# Patient Record
Sex: FEMALE | Race: WHITE | NOT HISPANIC OR LATINO | Employment: FULL TIME | URBAN - METROPOLITAN AREA
[De-identification: names, ages, dates, MRNs, and addresses within clinical notes are randomized per-mention and may not be internally consistent; named-entity substitution may affect disease eponyms.]

---

## 2022-05-20 DIAGNOSIS — E03.9 ACQUIRED HYPOTHYROIDISM: Primary | ICD-10-CM

## 2022-05-20 RX ORDER — LEVOTHYROXINE SODIUM 0.07 MG/1
75 TABLET ORAL DAILY
Qty: 90 TABLET | Refills: 1 | Status: SHIPPED | OUTPATIENT
Start: 2022-05-20 | End: 2022-06-20 | Stop reason: SDUPTHER

## 2022-08-02 PROBLEM — E66.811 OBESITY (BMI 30.0-34.9): Status: ACTIVE | Noted: 2022-08-02

## 2022-11-15 LAB
T4 FREE SERPL-MCNC: 1.1 NG/DL (ref 0.8–1.8)
TSH SERPL-ACNC: 1.93 MIU/L

## 2022-11-17 ENCOUNTER — OFFICE VISIT (OUTPATIENT)
Dept: ENDOCRINOLOGY | Facility: HOSPITAL | Age: 32
End: 2022-11-17

## 2022-11-17 VITALS
BODY MASS INDEX: 31.69 KG/M2 | HEIGHT: 66 IN | DIASTOLIC BLOOD PRESSURE: 70 MMHG | SYSTOLIC BLOOD PRESSURE: 96 MMHG | HEART RATE: 68 BPM | WEIGHT: 197.2 LBS

## 2022-11-17 DIAGNOSIS — E03.9 ACQUIRED HYPOTHYROIDISM: Primary | ICD-10-CM

## 2022-11-17 NOTE — PROGRESS NOTES
C[place] Han 28 y o  female MRN: [id number]    Encounter: [id number]      Assessment/Plan     Assessment: This is a 28y o -year-old female with hypothyroidism and polycystic ovarian syndrome  Plan:  1  Hypothyroidism:  Most recent thyroid lab work came back normal   Does continue with some weight gain  Is going through IVF after Christmas  At this time will not make any adjustments to her thyroid medication  She will continue with Synthroid 75 mcg daily  Get thyroid lab work completed at the beginning of January  From there will determine if we need to make adjustments to thyroid medication  When she becomes pregnant, we will recheck thyroid function every 4 weeks  Asked her to follow-up in 4 months  Contact the office with any concerns      2  Polycystic ovarian syndrome:  Complete work up was done and came back negative for other causes of her symptoms  Likely has PCOS  No need for medication at this time  We will continue to monitor at this time  CC:  Hypothyroidism and polycystic ovarian syndrome follow-up    History of Present Illness     HPI:  Aleksandra Torres 21  year old female with hypothyroidism who presents for consultation from endocrinology  So Tripathi reports being diagnosed with hypothyroidism about 4-5 years ago and is currently treated with levothyroxine 75 mcg daily  Dose was adjusted after last office visit  Continues to have difficulty losing weight  Mild fatigue at this time  Denies any vision changes, palpitations, tremors, abdominal pain, diarrhea or constipation, hair loss  Does have some dry skin    Additionally she notes coarse hair growth on her chin as well as posterior upper part of her lower extremity   She reports menstrual cycles have been somewhat more irregular  Further workup was completed and came back negative, so likely has polycystic ovarian syndrome    Has been seeing a fertility specialist, as plan on IVF after Christmas   She has no history of pregnancy previously as well  Otherwise doing well today  Review of Systems   Constitutional: Positive for fatigue  Negative for activity change, appetite change, diaphoresis and unexpected weight change  Difficulty losing weight   HENT: Negative for sore throat, trouble swallowing and voice change  Eyes: Negative for visual disturbance  Respiratory: Negative for chest tightness and shortness of breath  Cardiovascular: Negative for chest pain, palpitations and leg swelling  Gastrointestinal: Negative for abdominal pain, constipation and diarrhea  Endocrine: Negative for cold intolerance, heat intolerance, polydipsia, polyphagia and polyuria  Skin: Negative for rash  Dry skin   Neurological: Negative for dizziness, tremors, light-headedness, numbness and headaches  Hematological: Negative for adenopathy  Psychiatric/Behavioral: Negative for dysphoric mood and sleep disturbance  The patient is not nervous/anxious  Historical Information   Past Medical History:   Diagnosis Date   • Hypothyroidism      History reviewed  No pertinent surgical history    Social History   Social History     Substance and Sexual Activity   Alcohol Use Yes     Social History     Substance and Sexual Activity   Drug Use Never     Social History     Tobacco Use   Smoking Status Some Days   • Packs/day: 0 25   • Types: Cigarettes   • Last attempt to quit: 3/17/2022   • Years since quittin 6   Smokeless Tobacco Never   Tobacco Comments    2 cig a day since age 15     Family History:   Family History   Problem Relation Age of Onset   • Thyroid disease unspecified Mother    • Heart disease Mother    • Stroke Mother    • Hyperthyroidism Mother    • Diabetes Brother    • No Known Problems Father    • Colon cancer Cousin        Meds/Allergies   Current Outpatient Medications   Medication Sig Dispense Refill   • citalopram (CeleXA) 10 mg tablet TAKE 1 TABLET BY MOUTH EVERY DAY 90 tablet 1   • levothyroxine (Synthroid) 75 mcg tablet Take 1 tablet (75 mcg total) by mouth daily 90 tablet 1   • Prenatal Vit-Fe Fumarate-FA (PRENATAL VITAMINS PO) Take 1 tablet by mouth daily       No current facility-administered medications for this visit  No Known Allergies    Objective   Vitals: Blood pressure 96/70, pulse 68, height 5' 6" (1 676 m), weight 89 4 kg (197 lb 3 2 oz)  Physical Exam  Vitals and nursing note reviewed  Constitutional:       General: She is not in acute distress  Appearance: Normal appearance  She is not diaphoretic  HENT:      Head: Normocephalic and atraumatic  Eyes:      General: No scleral icterus  Extraocular Movements: Extraocular movements intact  Conjunctiva/sclera: Conjunctivae normal       Pupils: Pupils are equal, round, and reactive to light  Cardiovascular:      Rate and Rhythm: Normal rate and regular rhythm  Heart sounds: No murmur heard  Pulmonary:      Effort: Pulmonary effort is normal  No respiratory distress  Breath sounds: Normal breath sounds  No wheezing  Musculoskeletal:      Cervical back: Normal range of motion  Right lower leg: No edema  Left lower leg: No edema  Lymphadenopathy:      Cervical: No cervical adenopathy  Neurological:      Mental Status: She is alert and oriented to person, place, and time  Mental status is at baseline  Sensory: No sensory deficit  Gait: Gait normal    Psychiatric:         Mood and Affect: Mood normal          Behavior: Behavior normal          Thought Content: Thought content normal          The history was obtained from the review of the chart, patient  Lab Results:   Lab Results   Component Value Date/Time    Free t4 1 1 11/14/2022 12:05 PM    Free t4 1 3 06/30/2022 07:03 AM    Free t4 1 2 05/19/2022 08:25 AM       Portions of the record may have been created with voice recognition software   Occasional wrong word or "sound a like" substitutions may have occurred due to the inherent limitations of voice recognition software  Read the chart carefully and recognize, using context, where substitutions have occurred

## 2022-11-17 NOTE — PATIENT INSTRUCTIONS
Continue levothyroxine 75 mcg daily  Get lab work completed beginning of January  Contact the office with any concerns or questions  Follow-up in 4 months with lab work completed prior to visit

## 2022-12-22 DIAGNOSIS — E03.9 ACQUIRED HYPOTHYROIDISM: ICD-10-CM

## 2022-12-22 DIAGNOSIS — F33.0 MILD EPISODE OF RECURRENT MAJOR DEPRESSIVE DISORDER (HCC): ICD-10-CM

## 2022-12-22 RX ORDER — CITALOPRAM 10 MG/1
TABLET ORAL
Qty: 90 TABLET | Refills: 1 | Status: SHIPPED | OUTPATIENT
Start: 2022-12-22

## 2022-12-22 RX ORDER — LEVOTHYROXINE SODIUM 0.07 MG/1
TABLET ORAL
Qty: 90 TABLET | Refills: 1 | Status: SHIPPED | OUTPATIENT
Start: 2022-12-22

## 2023-01-10 LAB
ALBUMIN SERPL-MCNC: 4.4 G/DL (ref 3.6–5.1)
ALBUMIN/GLOB SERPL: 1.7 (CALC) (ref 1–2.5)
ALP SERPL-CCNC: 73 U/L (ref 31–125)
ALT SERPL-CCNC: 14 U/L (ref 6–29)
AST SERPL-CCNC: 16 U/L (ref 10–30)
BILIRUB SERPL-MCNC: 0.6 MG/DL (ref 0.2–1.2)
BUN SERPL-MCNC: 15 MG/DL (ref 7–25)
BUN/CREAT SERPL: NORMAL (CALC) (ref 6–22)
CALCIUM SERPL-MCNC: 9.1 MG/DL (ref 8.6–10.2)
CHLORIDE SERPL-SCNC: 105 MMOL/L (ref 98–110)
CO2 SERPL-SCNC: 25 MMOL/L (ref 20–32)
CREAT SERPL-MCNC: 0.64 MG/DL (ref 0.5–0.97)
GFR/BSA.PRED SERPLBLD CYS-BASED-ARV: 120 ML/MIN/1.73M2
GLOBULIN SER CALC-MCNC: 2.6 G/DL (CALC) (ref 1.9–3.7)
GLUCOSE SERPL-MCNC: 87 MG/DL (ref 65–99)
POTASSIUM SERPL-SCNC: 4.2 MMOL/L (ref 3.5–5.3)
PROT SERPL-MCNC: 7 G/DL (ref 6.1–8.1)
SODIUM SERPL-SCNC: 137 MMOL/L (ref 135–146)
T4 FREE SERPL-MCNC: 1.1 NG/DL (ref 0.8–1.8)
TSH SERPL-ACNC: 2.69 MIU/L

## 2023-01-11 DIAGNOSIS — E03.9 ACQUIRED HYPOTHYROIDISM: ICD-10-CM

## 2023-01-11 RX ORDER — LEVOTHYROXINE SODIUM 0.07 MG/1
TABLET ORAL
Qty: 96 TABLET | Refills: 1 | Status: SHIPPED | OUTPATIENT
Start: 2023-01-11 | End: 2023-03-22 | Stop reason: SDUPTHER

## 2023-02-07 ENCOUNTER — OFFICE VISIT (OUTPATIENT)
Dept: FAMILY MEDICINE CLINIC | Facility: HOSPITAL | Age: 33
End: 2023-02-07

## 2023-02-07 VITALS
DIASTOLIC BLOOD PRESSURE: 62 MMHG | SYSTOLIC BLOOD PRESSURE: 110 MMHG | HEART RATE: 64 BPM | WEIGHT: 204.6 LBS | TEMPERATURE: 98.6 F | BODY MASS INDEX: 32.88 KG/M2 | HEIGHT: 66 IN

## 2023-02-07 DIAGNOSIS — F33.0 MILD EPISODE OF RECURRENT MAJOR DEPRESSIVE DISORDER (HCC): ICD-10-CM

## 2023-02-07 DIAGNOSIS — Z00.00 ANNUAL PHYSICAL EXAM: Primary | ICD-10-CM

## 2023-02-07 DIAGNOSIS — E66.9 OBESITY (BMI 30.0-34.9): ICD-10-CM

## 2023-02-07 DIAGNOSIS — E03.9 ACQUIRED HYPOTHYROIDISM: ICD-10-CM

## 2023-02-07 PROBLEM — E28.2 PCOS (POLYCYSTIC OVARIAN SYNDROME): Status: ACTIVE | Noted: 2023-02-07

## 2023-02-07 NOTE — PATIENT INSTRUCTIONS
Wellness Visit for Adults   AMBULATORY CARE:   A wellness visit  is when you see your healthcare provider to get screened for health problems  Your healthcare provider will also give you advice on how to stay healthy  Write down your questions so you remember to ask them  Ask your healthcare provider how often you should have a wellness visit  What happens at a wellness visit:  Your healthcare provider will ask about your health, and your family history of health problems  This includes high blood pressure, heart disease, and cancer  He or she will ask if you have symptoms that concern you, if you smoke, and about your mood  You may also be asked about your intake of medicines, supplements, food, and alcohol  Any of the following may be done:  · Your weight  will be checked  Your height may also be checked so your body mass index (BMI) can be calculated  Your BMI shows if you are at a healthy weight  · Your blood pressure  and heart rate will be checked  Your temperature may also be checked  · Blood and urine tests  may be done  Blood tests may be done to check your cholesterol levels  Abnormal cholesterol levels increase your risk for heart disease and stroke  You may also need a blood or urine test to check for diabetes if you are at increased risk  Urine tests may be done to look for signs of an infection or kidney disease  · A physical exam  includes checking your heartbeat and lungs with a stethoscope  Your healthcare provider may also check your skin to look for sun damage  · Screening tests  may be recommended  A screening test is done to check for diseases that may not cause symptoms  The screening tests you may need depend on your age, gender, family history, and lifestyle habits  For example, colorectal screening may be recommended if you are 48years old or older  Screening tests you need if you are a woman:   · A Pap smear  is used to screen for cervical cancer   Pap smears are usually done every 3 to 5 years depending on your age  You may need them more often if you have had abnormal Pap smear test results in the past  Ask your healthcare provider how often you should have a Pap smear  · A mammogram  is an x-ray of your breasts to screen for breast cancer  Experts recommend mammograms every 2 years starting at age 48 years  You may need a mammogram at age 52 years or younger if you have an increased risk for breast cancer  Talk to your healthcare provider about when you should start having mammograms and how often you need them  Vaccines you may need:   · Get an influenza vaccine  every year  The influenza vaccine protects you from the flu  Several types of viruses cause the flu  The viruses change over time, so new vaccines are made each year  · Get a tetanus-diphtheria (Td) booster vaccine  every 10 years  This vaccine protects you against tetanus and diphtheria  Tetanus is a severe infection that may cause painful muscle spasms and lockjaw  Diphtheria is a severe bacterial infection that causes a thick covering in the back of your mouth and throat  · Get a human papillomavirus (HPV) vaccine  if you are female and aged 23 to 32 or male 23 to 24 and never received it  This vaccine protects you from HPV infection  HPV is the most common infection spread by sexual contact  HPV may also cause vaginal, penile, and anal cancers  · Get a pneumococcal vaccine  if you are aged 72 years or older  The pneumococcal vaccine is an injection given to protect you from pneumococcal disease  Pneumococcal disease is an infection caused by pneumococcal bacteria  The infection may cause pneumonia, meningitis, or an ear infection  · Get a shingles vaccine  if you are 60 or older, even if you have had shingles before  The shingles vaccine is an injection to protect you from the varicella-zoster virus  This is the same virus that causes chickenpox   Shingles is a painful rash that develops in people who had chickenpox or have been exposed to the virus  How to eat healthy:  My Plate is a model for planning healthy meals  It shows the types and amounts of foods that should go on your plate  Fruits and vegetables make up about half of your plate, and grains and protein make up the other half  A serving of dairy is included on the side of your plate  The amount of calories and serving sizes you need depends on your age, gender, weight, and height  Examples of healthy foods are listed below:  · Eat a variety of vegetables  such as dark green, red, and orange vegetables  You can also include canned vegetables low in sodium (salt) and frozen vegetables without added butter or sauces  · Eat a variety of fresh fruits , canned fruit in 100% juice, frozen fruit, and dried fruit  · Include whole grains  At least half of the grains you eat should be whole grains  Examples include whole-wheat bread, wheat pasta, brown rice, and whole-grain cereals such as oatmeal     · Eat a variety of protein foods such as seafood (fish and shellfish), lean meat, and poultry without skin (turkey and chicken)  Examples of lean meats include pork leg, shoulder, or tenderloin, and beef round, sirloin, tenderloin, and extra lean ground beef  Other protein foods include eggs and egg substitutes, beans, peas, soy products, nuts, and seeds  · Choose low-fat dairy products such as skim or 1% milk or low-fat yogurt, cheese, and cottage cheese  · Limit unhealthy fats  such as butter, hard margarine, and shortening  Exercise:  Exercise at least 30 minutes per day on most days of the week  Some examples of exercise include walking, biking, dancing, and swimming  You can also fit in more physical activity by taking the stairs instead of the elevator or parking farther away from stores  Include muscle strengthening activities 2 days each week  Regular exercise provides many health benefits   It helps you manage your weight, and decreases your risk for type 2 diabetes, heart disease, stroke, and high blood pressure  Exercise can also help improve your mood  Ask your healthcare provider about the best exercise plan for you  General health and safety guidelines:   · Do not smoke  Nicotine and other chemicals in cigarettes and cigars can cause lung damage  Ask your healthcare provider for information if you currently smoke and need help to quit  E-cigarettes or smokeless tobacco still contain nicotine  Talk to your healthcare provider before you use these products  · Limit alcohol  A drink of alcohol is 12 ounces of beer, 5 ounces of wine, or 1½ ounces of liquor  · Lose weight, if needed  Being overweight increases your risk of certain health conditions  These include heart disease, high blood pressure, type 2 diabetes, and certain types of cancer  · Protect your skin  Do not sunbathe or use tanning beds  Use sunscreen with a SPF 15 or higher  Apply sunscreen at least 15 minutes before you go outside  Reapply sunscreen every 2 hours  Wear protective clothing, hats, and sunglasses when you are outside  · Drive safely  Always wear your seatbelt  Make sure everyone in your car wears a seatbelt  A seatbelt can save your life if you are in an accident  Do not use your cell phone when you are driving  This could distract you and cause an accident  Pull over if you need to make a call or send a text message  · Practice safe sex  Use latex condoms if are sexually active and have more than one partner  Your healthcare provider may recommend screening tests for sexually transmitted infections (STIs)  · Wear helmets, lifejackets, and protective gear  Always wear a helmet when you ride a bike or motorcycle, go skiing, or play sports that could cause a head injury  Wear protective equipment when you play sports  Wear a lifejacket when you are on a boat or doing water sports      © Copyright [employer] 2022 Information is for End User's use only and may not be sold, redistributed or otherwise used for commercial purposes  All illustrations and images included in CareNotes® are the copyrighted property of A D A M , Inc  or Faizan Bain  The above information is an  only  It is not intended as medical advice for individual conditions or treatments  Talk to your doctor, nurse or pharmacist before following any medical regimen to see if it is safe and effective for you  Obesity   AMBULATORY CARE:   Obesity  means your body mass index (BMI) is greater than 30  Your healthcare provider will use your height and weight to measure your BMI  The risks of obesity include  many health problems, including injuries or physical disability  · Diabetes (high blood sugar level)    · High blood pressure or high cholesterol    · Heart disease    · Stroke    · Gallbladder or liver disease    · Cancer of the colon, breast, prostate, liver, or kidney    · Sleep apnea    · Arthritis or gout    Screening  is done to check for health conditions before you have signs or symptoms  If you are 28to 79years old, your blood sugar level may be checked every 3 years for signs of prediabetes or diabetes  Your healthcare provider will check your blood pressure at each visit  High blood pressure can lead to a stroke or other problems  Your provider may check for signs of heart disease, cancer, or other health problems  Seek care immediately if:   · You have a severe headache, confusion, or difficulty speaking  · You have weakness on one side of your body  · You have chest pain, sweating, or shortness of breath  Call your doctor if:   · You have symptoms of gallbladder or liver disease, such as pain in your upper abdomen  · You have knee or hip pain and discomfort while walking  · You have symptoms of diabetes, such as intense hunger and thirst, and frequent urination      · You have symptoms of sleep apnea, such as snoring or daytime sleepiness  · You have questions or concerns about your condition or care  Treatment for obesity  focuses on helping you lose weight to improve your health  Even a small decrease in BMI can reduce the risk for many health problems  Your healthcare provider will help you set a weight-loss goal   · Lifestyle changes  are the first step in treating obesity  These include making healthy food choices and getting regular physical activity  Your healthcare provider may suggest a weight-loss program that involves coaching, education, and therapy  · Medicine  may help you lose weight when it is used with a healthy foods and physical activity  · Surgery  can help you lose weight if you are very obese and have other health problems  There are several types of weight-loss surgery  Ask your healthcare provider for more information  Tips for safe weight loss:   · Set small, realistic goals  An example of a small goal is to walk for 20 minutes 5 days a week  Anther goal is to lose 5% of your body weight  · Tell friends, family members, and coworkers about your goals  and ask for their support  Ask a friend to lose weight with you, or join a weight-loss support group  · Identify foods or triggers that may cause you to overeat , and find ways to avoid them  Remove tempting high-calorie foods from your home and workplace  Place a bowl of fresh fruit on your kitchen counter  If stress causes you to eat, then find other ways to cope with stress  A counselor or therapist may be able to help you  · Keep a diary to track what you eat and drink  Also write down how many minutes of physical activity you do each day  Weigh yourself once a week and record it in your diary  Eating changes: You will need to eat 500 to 1,000 fewer calories each day than you currently eat to lose 1 to 2 pounds a week  The following changes will help you cut calories:  · Eat smaller portions    Use small plates, no larger than 9 inches in diameter  Fill your plate half full of fruits and vegetables  Measure your food using measuring cups until you know what a serving size looks like  · Eat 3 meals and 1 or 2 snacks each day  Plan your meals in advance  Gwenevere Basil and eat at home most of the time  Eat slowly  Do not skip meals  Skipping meals can lead to overeating later in the day  This can make it harder for you to lose weight  Talk with a dietitian to help you make a meal plan and schedule that is right for you  · Eat fruits and vegetables at every meal   They are low in calories and high in fiber, which makes you feel full  Do not add butter, margarine, or cream sauce to vegetables  Use herbs to season steamed vegetables  · Eat less fat and fewer fried foods  Eat more baked or grilled chicken and fish  These protein sources are lower in calories and fat than red meat  Limit fast food  Dress your salads with olive oil and vinegar instead of bottled dressing  · Limit the amount of sugar you eat  Do not drink sugary beverages  Limit alcohol  Activity changes:  Physical activity is good for your body in many ways  It helps you burn calories and build strong muscles  It decreases stress and depression, and improves your mood  It can also help you sleep better  Talk to your healthcare provider before you begin an exercise program   · Exercise for at least 30 minutes 5 days a week  Start slowly  Set aside time each day for physical activity that you enjoy and that is convenient for you  It is best to do both weight training and an activity that increases your heart rate, such as walking, bicycling, or swimming  · Find ways to be more active  Do yard work and housecleaning  Walk up the stairs instead of using elevators  Spend your leisure time going to events that require walking, such as outdoor festivals or fairs  This extra physical activity can help you lose weight and keep it off         Follow up with your doctor as directed: You may need to meet with a dietitian  Write down your questions so you remember to ask them during your visits  © Copyright [employer] 2022 Information is for End User's use only and may not be sold, redistributed or otherwise used for commercial purposes  All illustrations and images included in CareNotes® are the copyrighted property of A D A M , Inc  or Faizan Schaefer   The above information is an  only  It is not intended as medical advice for individual conditions or treatments  Talk to your doctor, nurse or pharmacist before following any medical regimen to see if it is safe and effective for you  Cigarette Smoking and Your Health   AMBULATORY CARE:   Risks to your health if you smoke:  Nicotine and other chemicals found in tobacco and e-cigarettes can damage every cell in your body  Even if you are a light smoker, you have an increased risk for cancer, heart disease, and lung disease  If you are pregnant or have diabetes, smoking increases your risk for complications  Nicotine can affect an adolescent's developing brain  This can lead to trouble thinking, learning, or paying attention  Benefits to your health if you stop smoking:   · You decrease respiratory symptoms such as coughing, wheezing, and shortness of breath  · You reduce your risk for cancers of the lung, mouth, throat, kidney, bladder, pancreas, stomach, and cervix  If you already have cancer, you increase the benefits of chemotherapy  You also reduce your risk for cancer returning or a second cancer from developing  · You reduce your risk for heart disease, blood clots, heart attack, and stroke  · You reduce your risk for lung infections, and diseases such as pneumonia, asthma, chronic bronchitis, and emphysema  · Your circulation improves  More oxygen can be delivered to your body  If you have diabetes, you lower your risk for complications, such as kidney, artery, and eye diseases  You also lower your risk for nerve damage  Nerve damage can lead to amputations, poor vision, and blindness  · You improve your body's ability to heal and to fight infections  · An adolescent can help his or her brain and body develop in a healthy way  Talk to your adolescent about all the health risks of nicotine  If you can, start talking about nicotine when your child is younger than 12 years  This may make it easier for him or her not to start using nicotine as a teenager or adult  Explain to him or her that it is best never to start  It can be hard to try to quit later  Benefits to the health of others if you stop smoking:  Tobacco is harmful to nonsmokers who breathe in your secondhand smoke  The following are ways the health of others around you may improve when you stop smoking:  · You lower the risks for lung cancer and heart disease in nonsmoking adults  · If you are pregnant, you lower the risk for miscarriage, early delivery, low birth weight, and stillbirth  You also lower your baby's risk for SIDS, obesity, developmental delay, and neurobehavioral problems, such as ADHD  · If you have children, you lower their risk for ear infections, colds, pneumonia, bronchitis, and asthma  Follow up with your doctor as directed:  Write down your questions so you remember to ask them during your visits  For support and more information:   · American [employer] Association  [address] [facility]  [address]  Phone: [facility] [address]  Phone: 5- 003 - 047-0535  Web Address: [place]    · Smokefree  gov  Phone: 1- 559 - 223-3293  Web Address: www smokefree    [address] 2022 Information is for End User's use only and may not be sold, redistributed or otherwise used for commercial purposes  All illustrations and images included in CareNotes® are the copyrighted property of [employer]  or Faizan Schaefer   The above information is an  only   It is not intended as medical advice for individual conditions or treatments  Talk to your doctor, nurse or pharmacist before following any medical regimen to see if it is safe and effective for you

## 2023-02-07 NOTE — PROGRESS NOTES
[facility] [address]     NAME: Sabrina Barrow  AGE: 28 y o  SEX: female  : 1990     DATE: 2023     Assessment and Plan:     Problem List Items Addressed This Visit        Endocrine    Hypothyroidism     TSH a bit high on recent labs for upcoming poss pregnancy - Endo just adjusted dose - med list reviewed, con't meds/labs/follow up as per Endo            Other    Mild episode of recurrent major depressive disorder ([address] )     Mood a bit down - having stressors at home with  having a hard time dealing with infertility issues and starting IVF next week, pt deferring need to increase Celexa - will call with new/worse mood         Obesity (BMI 30 0-34 9)   Other Visit Diagnoses     Annual physical exam    -  Primary    BMI 33 0-33 9,adult              Immunizations and preventive care screenings were discussed with patient today  Appropriate education was printed on patient's after visit summary  Counseling:  Alcohol/drug use: discussed moderation in alcohol intake, the recommendations for healthy alcohol use, and avoidance of illicit drug use  Dental Health: discussed importance of regular tooth brushing, flossing, and dental visits  Injury prevention: discussed safety/seat belts, safety helmets, smoke detectors, carbon dioxide detectors, and smoking near bedding or upholstery  · Exercise: the importance of regular exercise/physical activity was discussed  Recommend exercise 3-5 times per week for at least 30 minutes  · Cervical cancer screening: PAP     BMI Counseling: Body mass index is 33 02 kg/m²   The BMI is above normal  Nutrition recommendations include decreasing portion sizes, encouraging healthy choices of fruits and vegetables, consuming healthier snacks, limiting drinks that contain sugar, moderation in carbohydrate intake, increasing intake of lean protein, reducing intake of saturated and trans fat and reducing intake of cholesterol  Exercise recommendations include exercising 3-5 times per week  No pharmacotherapy was ordered  Rationale for BMI follow-up plan is due to patient being overweight or obese  Tobacco Cessation Counseling: Tobacco cessation counseling was provided  The patient is sincerely urged to quit consumption of tobacco  She is ready to quit tobacco  Medication options not discussed  BW     Return in about 6 months (around 2023), or if symptoms worsen or fail to improve, for Recheck  Chief Complaint:     Chief Complaint   Patient presents with   • Follow-up      History of Present Illness:     Adult Annual Physical   Patient here for a comprehensive physical exam  The patient reports problems - mood is OK "I'm at a depressive mood right now but I'm OK"  She is starting first IVF round  She is taking her Celexa daily w/o SE  She is sleeping well "too much"  She notes appetite is up and down  Pt saw Irais Greene) in Nov for f/u hypothyroid and PCOS - OV note reviewed  She was told to con't her current levothyroxine and to repeat BW in  - labs from  reviewed  TSH 2 69 and pt had levothyroxine increased to 75 mcg 1 tab 6 days a week and 1 5 tabs on Sun only  She was told to repeat BW in 6 wks  She ntoes no tremor/palp/C/D/hair loss/skin changes  She does feel fatigued  Diet and Physical Activity  · Diet/Nutrition: well balanced diet, limited junk food and consuming 3-5 servings of fruits/vegetables daily  · Exercise: no formal exercise        Depression Screening  PHQ-2/9 Depression Screening    Little interest or pleasure in doing things: 0 - not at all  Feeling down, depressed, or hopeless: 0 - not at all  Trouble falling or staying asleep, or sleeping too much: 1 - several days  Feeling tired or having little energy: 1 - several days  Poor appetite or overeatin - not at all  Feeling bad about yourself - or that you are a failure or have let yourself or your family down: 0 - not at all  Trouble concentrating on things, such as reading the newspaper or watching television: 1 - several days  Moving or speaking so slowly that other people could have noticed  Or the opposite - being so fidgety or restless that you have been moving around a lot more than usual: 0 - not at all  Thoughts that you would be better off dead, or of hurting yourself in some way: 0 - not at all  PHQ-9 Score: 3   PHQ-9 Interpretation: No or Minimal depression        General Health  · Sleep: sleeps well  · Hearing: normal - bilateral   · Vision: no vision problems, most recent eye exam >1 year ago and wears glasses  · Dental: regular dental visits, brushes teeth twice daily and flossing regularly  /GYN Health  · Last menstrual period: 1/19/23  · Contraceptive method: going to be starting IVF next week  · History of STDs?: no      Review of Systems:     Review of Systems   Constitutional: Positive for fatigue  Negative for chills, fever and unexpected weight change  HENT: Negative for congestion and trouble swallowing  Eyes: Negative for pain and visual disturbance  Respiratory: Negative for cough, shortness of breath and wheezing  Cardiovascular: Negative for chest pain, palpitations and leg swelling  Gastrointestinal: Negative for abdominal pain, blood in stool, constipation, diarrhea, nausea and vomiting  Genitourinary: Negative for difficulty urinating, dysuria and menstrual problem  Musculoskeletal: Negative for back pain and neck pain  Skin: Negative for rash and wound  Neurological: Negative for dizziness, light-headedness and headaches  Hematological: Does not bruise/bleed easily  Psychiatric/Behavioral: Positive for dysphoric mood  Negative for sleep disturbance  The patient is not nervous/anxious  Past Medical History:     Past Medical History:   Diagnosis Date   • Hypothyroidism       Past Surgical History:     History reviewed  No pertinent surgical history  Social History:     Social History     Socioeconomic History   • Marital status: /Civil Union     Spouse name: None   • Number of children: None   • Years of education: None   • Highest education level: None   Occupational History   • Occupation: behavioral therapist   Tobacco Use   • Smoking status: Some Days     Packs/day: 0 25     Types: Cigarettes     Last attempt to quit: 3/17/2022     Years since quittin 8   • Smokeless tobacco: Never   • Tobacco comments:     2 cig a day since age 15   Vaping Use   • Vaping Use: Former   • Substances: Flavoring   Substance and Sexual Activity   • Alcohol use: Yes   • Drug use: Never   • Sexual activity: Yes     Partners: Male     Birth control/protection: None   Other Topics Concern   • None   Social History Narrative   • None     Social Determinants of Health     Financial Resource Strain: Not on file   Food Insecurity: Not on file   Transportation Needs: Not on file   Physical Activity: Not on file   Stress: Not on file   Social Connections: Not on file   Intimate Partner Violence: Not on file   Housing Stability: Not on file      Family History:     Family History   Problem Relation Age of Onset   • Thyroid disease unspecified Mother    • Heart disease Mother    • Stroke Mother    • Hyperthyroidism Mother    • Diabetes Brother    • No Known Problems Father    • Colon cancer Cousin       Current Medications:     Current Outpatient Medications   Medication Sig Dispense Refill   • citalopram (CeleXA) 10 mg tablet TAKE 1 TABLET BY MOUTH EVERY DAY 90 tablet 1   • levothyroxine 75 mcg tablet Take 1 tablet 6 days a week and 1 5 tablets on   96 tablet 1   • Prenatal Vit-Fe Fumarate-FA (PRENATAL VITAMINS PO) Take 1 tablet by mouth daily       No current facility-administered medications for this visit        Allergies:     No Known Allergies   Physical Exam:     /62   Pulse 64   Temp 98 6 °F (37 °C) (Tympanic)   Ht 5' 6" (1 676 m)   Wt 92 8 kg (204 lb 9 6 oz)   BMI 33 02 kg/m²     Physical Exam  Vitals and nursing note reviewed  Constitutional:       General: She is not in acute distress  Appearance: She is well-developed  She is obese  She is not ill-appearing  HENT:      Head: Normocephalic and atraumatic  Right Ear: Tympanic membrane and external ear normal       Left Ear: Tympanic membrane and external ear normal    Eyes:      General:         Right eye: No discharge  Left eye: No discharge  Conjunctiva/sclera: Conjunctivae normal    Neck:      Thyroid: No thyromegaly  Trachea: No tracheal deviation  Cardiovascular:      Rate and Rhythm: Normal rate and regular rhythm  Heart sounds: Normal heart sounds  No murmur heard  Pulmonary:      Effort: Pulmonary effort is normal  No respiratory distress  Breath sounds: Normal breath sounds  No wheezing, rhonchi or rales  Abdominal:      General: There is no distension  Palpations: Abdomen is soft  Tenderness: There is no abdominal tenderness  There is no guarding or rebound  Musculoskeletal:         General: No deformity or signs of injury  Cervical back: Neck supple  Lymphadenopathy:      Cervical: No cervical adenopathy  Skin:     General: Skin is warm and dry  Coloration: Skin is not pale  Findings: No bruising or rash  Neurological:      General: No focal deficit present  Mental Status: She is alert  Mental status is at baseline  Motor: No abnormal muscle tone  Gait: Gait normal    Psychiatric:         Mood and Affect: Mood normal          Behavior: Behavior normal          Thought Content:  Thought content normal          Judgment: Judgment normal           Isaura Boland DO   [address]

## 2023-02-08 NOTE — ASSESSMENT & PLAN NOTE
Mood a bit down - having stressors at home with  having a hard time dealing with infertility issues and starting IVF next week, pt deferring need to increase Celexa - will call with new/worse mood

## 2023-03-07 ENCOUNTER — APPOINTMENT (EMERGENCY)
Dept: ULTRASOUND IMAGING | Facility: HOSPITAL | Age: 33
End: 2023-03-07

## 2023-03-07 ENCOUNTER — APPOINTMENT (EMERGENCY)
Dept: RADIOLOGY | Facility: HOSPITAL | Age: 33
End: 2023-03-07

## 2023-03-07 ENCOUNTER — HOSPITAL ENCOUNTER (EMERGENCY)
Facility: HOSPITAL | Age: 33
Discharge: HOME | End: 2023-03-07
Attending: EMERGENCY MEDICINE

## 2023-03-07 ENCOUNTER — APPOINTMENT (EMERGENCY)
Dept: CT IMAGING | Facility: HOSPITAL | Age: 33
End: 2023-03-07

## 2023-03-07 VITALS
SYSTOLIC BLOOD PRESSURE: 108 MMHG | TEMPERATURE: 98.7 F | RESPIRATION RATE: 20 BRPM | HEART RATE: 89 BPM | DIASTOLIC BLOOD PRESSURE: 61 MMHG | OXYGEN SATURATION: 99 %

## 2023-03-07 DIAGNOSIS — J18.9 PNEUMONIA: ICD-10-CM

## 2023-03-07 DIAGNOSIS — R18.8 FREE FLUID IN PELVIS: ICD-10-CM

## 2023-03-07 DIAGNOSIS — R10.9 ABDOMINAL PAIN: Primary | ICD-10-CM

## 2023-03-07 DIAGNOSIS — N98.1 OVARIAN HYPERSTIMULATION SYNDROME: ICD-10-CM

## 2023-03-07 LAB
ALBUMIN SERPL BCP-MCNC: 4.3 G/DL (ref 3.5–5)
ALP SERPL-CCNC: 93 U/L (ref 34–104)
ALT SERPL W P-5'-P-CCNC: 15 U/L (ref 7–52)
ANION GAP SERPL CALCULATED.3IONS-SCNC: 5 MMOL/L (ref 4–13)
AST SERPL W P-5'-P-CCNC: 17 U/L (ref 13–39)
BACTERIA UR QL AUTO: ABNORMAL /HPF
BASOPHILS # BLD AUTO: 0.08 THOUSANDS/ÂΜL (ref 0–0.1)
BASOPHILS NFR BLD AUTO: 1 % (ref 0–1)
BILIRUB SERPL-MCNC: 1.12 MG/DL (ref 0.2–1)
BILIRUB UR QL STRIP: NEGATIVE
BUN SERPL-MCNC: 12 MG/DL (ref 5–25)
CALCIUM SERPL-MCNC: 9 MG/DL (ref 8.4–10.2)
CHLORIDE SERPL-SCNC: 104 MMOL/L (ref 96–108)
CLARITY UR: CLEAR
CO2 SERPL-SCNC: 28 MMOL/L (ref 21–32)
COLOR UR: YELLOW
CREAT SERPL-MCNC: 0.61 MG/DL (ref 0.6–1.3)
EOSINOPHIL # BLD AUTO: 0.29 THOUSAND/ÂΜL (ref 0–0.61)
EOSINOPHIL NFR BLD AUTO: 2 % (ref 0–6)
ERYTHROCYTE [DISTWIDTH] IN BLOOD BY AUTOMATED COUNT: 13.6 % (ref 11.6–15.1)
EXT PREGNANCY TEST URINE: NEGATIVE
EXT. CONTROL: NORMAL
GFR SERPL CREATININE-BSD FRML MDRD: 120 ML/MIN/1.73SQ M
GLUCOSE SERPL-MCNC: 91 MG/DL (ref 65–140)
GLUCOSE UR STRIP-MCNC: NEGATIVE MG/DL
HCT VFR BLD AUTO: 34.2 % (ref 34.8–46.1)
HGB BLD-MCNC: 11.1 G/DL (ref 11.5–15.4)
HGB UR QL STRIP.AUTO: ABNORMAL
IMM GRANULOCYTES # BLD AUTO: 0.07 THOUSAND/UL (ref 0–0.2)
IMM GRANULOCYTES NFR BLD AUTO: 1 % (ref 0–2)
KETONES UR STRIP-MCNC: NEGATIVE MG/DL
LEUKOCYTE ESTERASE UR QL STRIP: NEGATIVE
LIPASE SERPL-CCNC: 14 U/L (ref 11–82)
LYMPHOCYTES # BLD AUTO: 1.92 THOUSANDS/ÂΜL (ref 0.6–4.47)
LYMPHOCYTES NFR BLD AUTO: 13 % (ref 14–44)
MCH RBC QN AUTO: 30.8 PG (ref 26.8–34.3)
MCHC RBC AUTO-ENTMCNC: 32.5 G/DL (ref 31.4–37.4)
MCV RBC AUTO: 95 FL (ref 82–98)
MONOCYTES # BLD AUTO: 0.86 THOUSAND/ÂΜL (ref 0.17–1.22)
MONOCYTES NFR BLD AUTO: 6 % (ref 4–12)
MUCOUS THREADS UR QL AUTO: ABNORMAL
NEUTROPHILS # BLD AUTO: 11.63 THOUSANDS/ÂΜL (ref 1.85–7.62)
NEUTS SEG NFR BLD AUTO: 77 % (ref 43–75)
NITRITE UR QL STRIP: NEGATIVE
NON-SQ EPI CELLS URNS QL MICRO: ABNORMAL /HPF
NRBC BLD AUTO-RTO: 0 /100 WBCS
PH UR STRIP.AUTO: 5.5 [PH]
PLATELET # BLD AUTO: 436 THOUSANDS/UL (ref 149–390)
PMV BLD AUTO: 9.6 FL (ref 8.9–12.7)
POTASSIUM SERPL-SCNC: 3.7 MMOL/L (ref 3.5–5.3)
PROT SERPL-MCNC: 7.6 G/DL (ref 6.4–8.4)
PROT UR STRIP-MCNC: NEGATIVE MG/DL
RBC # BLD AUTO: 3.6 MILLION/UL (ref 3.81–5.12)
RBC #/AREA URNS AUTO: ABNORMAL /HPF
SODIUM SERPL-SCNC: 137 MMOL/L (ref 135–147)
SP GR UR STRIP.AUTO: 1.02 (ref 1–1.03)
T4 FREE SERPL-MCNC: 1.1 NG/DL (ref 0.8–1.8)
TSH SERPL-ACNC: 2.13 MIU/L
UROBILINOGEN UR STRIP-ACNC: 2 MG/DL
WBC # BLD AUTO: 14.85 THOUSAND/UL (ref 4.31–10.16)
WBC #/AREA URNS AUTO: ABNORMAL /HPF

## 2023-03-07 RX ORDER — AMOXICILLIN 500 MG/1
500 CAPSULE ORAL 3 TIMES DAILY
Qty: 21 CAPSULE | Refills: 0 | Status: SHIPPED | OUTPATIENT
Start: 2023-03-07 | End: 2023-03-14

## 2023-03-07 RX ORDER — AMOXICILLIN 250 MG/1
500 CAPSULE ORAL ONCE
Status: COMPLETED | OUTPATIENT
Start: 2023-03-07 | End: 2023-03-07

## 2023-03-07 RX ADMIN — IOHEXOL 100 ML: 350 INJECTION, SOLUTION INTRAVENOUS at 15:36

## 2023-03-07 RX ADMIN — AMOXICILLIN 500 MG: 250 CAPSULE ORAL at 20:14

## 2023-03-07 NOTE — ED PROVIDER NOTES
History  Chief Complaint   Patient presents with   • Abdominal Pain     Patient c/o LLQ abdominal pain that started last Tuesday  Patient states"2 weeks ago she had IVF egg retrievel treatment"  Patient denies n,v,d      Evgeny Mejia is a 29 y/o female with PMH hypothyroidism, depression, PCOS undergoing IVF who presents to the ER today with LLQ abdominal pain since Thursday  Patient states on 2/23 she had an egg retrieval for IVF, which was uncomplicated  She states last Thursday she started to develop LLQ abdominal pain when she has a bowel movement or she is passing gas  She states it is a 10/10 pain and also hurts when she sits/walks  She states it is gradually radiating to her periumbilical region as well  She states that her bowel movements have been otherwise normal and she has 1-2 per day  Decreased appetite due to the pain  She denies any nausea, vomiting, fevers, chills, changes in urination, blood in urine or stool, abnormal vaginal bleeding or discharge  LMP last week  She has never been pregnant before  Patient says she called her IVF doctor and had a transvaginal ultrasound yesterday and was told all of her reproductive organs looked normal  She denies trying any medications for this pain  No abdominal surgeries in the past  No known allergies         History provided by:  Patient   used: No    Abdominal Pain  Pain location:  LLQ  Pain quality: sharp    Pain radiates to:  Periumbilical region  Pain severity:  Severe  Onset quality:  Gradual  Duration:  6 days  Timing:  Intermittent  Chronicity:  New  Ineffective treatments:  None tried  Associated symptoms: anorexia and flatus    Associated symptoms: no chest pain, no chills, no constipation, no diarrhea, no dysuria, no fever, no hematemesis, no hematochezia, no hematuria, no melena, no nausea, no shortness of breath, no vaginal bleeding, no vaginal discharge and no vomiting        Prior to Admission Medications   Prescriptions Last Dose Informant Patient Reported? Taking? Prenatal Vit-Fe Fumarate-FA (PRENATAL VITAMINS PO)   Yes No   Sig: Take 1 tablet by mouth daily   citalopram (CeleXA) 10 mg tablet   No No   Sig: TAKE 1 TABLET BY MOUTH EVERY DAY   levothyroxine 75 mcg tablet   No No   Sig: Take 1 tablet 6 days a week and 1 5 tablets on   Facility-Administered Medications: None       Past Medical History:   Diagnosis Date   • Hypothyroidism        History reviewed  No pertinent surgical history  Family History   Problem Relation Age of Onset   • Thyroid disease unspecified Mother    • Heart disease Mother    • Stroke Mother    • Hyperthyroidism Mother    • Diabetes Brother    • No Known Problems Father    • Colon cancer Cousin      I have reviewed and agree with the history as documented  E-Cigarette/Vaping   • E-Cigarette Use Former User      E-Cigarette/Vaping Substances   • Nicotine No    • Flavoring Yes      Social History     Tobacco Use   • Smoking status: Some Days     Packs/day: 0 25     Types: Cigarettes     Last attempt to quit: 3/17/2022     Years since quittin 9   • Smokeless tobacco: Never   • Tobacco comments:     2 cig a day since age 15   Vaping Use   • Vaping Use: Former   • Substances: Flavoring   Substance Use Topics   • Alcohol use: Yes   • Drug use: Never       Review of Systems   Constitutional: Negative for chills and fever  Respiratory: Negative for shortness of breath  Cardiovascular: Negative for chest pain  Gastrointestinal: Positive for abdominal pain, anorexia and flatus  Negative for blood in stool, constipation, diarrhea, hematemesis, hematochezia, melena, nausea and vomiting  Genitourinary: Negative for dysuria, frequency, hematuria, pelvic pain, urgency, vaginal bleeding and vaginal discharge  Skin: Negative for color change  Neurological: Negative for headaches  Psychiatric/Behavioral: Negative for behavioral problems and sleep disturbance     All other systems reviewed and are negative  Physical Exam  Physical Exam  Vitals and nursing note reviewed  Constitutional:       General: She is awake  Appearance: Normal appearance  She is well-developed  HENT:      Head: Normocephalic and atraumatic  Right Ear: External ear normal       Left Ear: External ear normal       Nose: Nose normal    Eyes:      General: No scleral icterus  Extraocular Movements: Extraocular movements intact  Cardiovascular:      Rate and Rhythm: Normal rate and regular rhythm  Heart sounds: Normal heart sounds, S1 normal and S2 normal  No murmur heard  No gallop  Pulmonary:      Effort: Pulmonary effort is normal       Breath sounds: Normal breath sounds  No wheezing, rhonchi or rales  Abdominal:      General: Abdomen is flat  Bowel sounds are decreased  Palpations: Abdomen is soft  Tenderness: There is abdominal tenderness in the periumbilical area and left lower quadrant  There is no right CVA tenderness, left CVA tenderness, guarding or rebound  Musculoskeletal:         General: Normal range of motion  Cervical back: Normal range of motion  Skin:     General: Skin is warm and dry  Neurological:      General: No focal deficit present  Mental Status: She is alert  Psychiatric:         Attention and Perception: Attention and perception normal          Mood and Affect: Mood normal          Behavior: Behavior normal  Behavior is cooperative           Vital Signs  ED Triage Vitals [03/07/23 1339]   Temperature Pulse Respirations Blood Pressure SpO2   98 7 °F (37 1 °C) 89 20 108/61 99 %      Temp Source Heart Rate Source Patient Position - Orthostatic VS BP Location FiO2 (%)   Oral -- -- -- --      Pain Score       No Pain           Vitals:    03/07/23 1339   BP: 108/61   Pulse: 89         Visual Acuity      ED Medications  Medications   iohexol (OMNIPAQUE) 350 MG/ML injection (SINGLE-DOSE) 100 mL (100 mL Intravenous Given 3/7/23 1536)   amoxicillin (AMOXIL) capsule 500 mg (500 mg Oral Given 3/7/23 2014)       Diagnostic Studies  Results Reviewed     Procedure Component Value Units Date/Time    Urine Microscopic [[id number]]  (Abnormal) Collected: 03/07/23 1427    Lab Status: Final result Specimen: Urine, Clean Catch Updated: 03/07/23 1452     RBC, UA 4-10 /hpf      WBC, UA 1-2 /hpf      Epithelial Cells Occasional /hpf      Bacteria, UA Moderate /hpf      MUCUS THREADS Innumerable    UA w Reflex to Microscopic w Reflex to Culture [[id number]]  (Abnormal) Collected: 03/07/23 1427    Lab Status: Final result Specimen: Urine, Clean Catch Updated: 03/07/23 1451     Color, UA Yellow     Clarity, UA Clear     Specific Gravity, UA 1 025     pH, UA 5 5     Leukocytes, UA Negative     Nitrite, UA Negative     Protein, UA Negative mg/dl      Glucose, UA Negative mg/dl      Ketones, UA Negative mg/dl      Urobilinogen, UA 2 0 mg/dl      Bilirubin, UA Negative     Occult Blood, UA Small    Comprehensive metabolic panel [[id number]]  (Abnormal) Collected: 03/07/23 1427    Lab Status: Final result Specimen: Blood from Arm, Right Updated: 03/07/23 1450     Sodium 137 mmol/L      Potassium 3 7 mmol/L      Chloride 104 mmol/L      CO2 28 mmol/L      ANION GAP 5 mmol/L      BUN 12 mg/dL      Creatinine 0 61 mg/dL      Glucose 91 mg/dL      Calcium 9 0 mg/dL      AST 17 U/L      ALT 15 U/L      Alkaline Phosphatase 93 U/L      Total Protein 7 6 g/dL      Albumin 4 3 g/dL      Total Bilirubin 1 12 mg/dL      eGFR 120 ml/min/1 73sq m     Narrative:      [place] guidelines for Chronic Kidney Disease (CKD):   •  Stage 1 with normal or high GFR (GFR > 90 mL/min/1 73 square meters)  •  Stage 2 Mild CKD (GFR = 60-89 mL/min/1 73 square meters)  •  Stage 3A Moderate CKD (GFR = 45-59 mL/min/1 73 square meters)  •  Stage 3B Moderate CKD (GFR = 30-44 mL/min/1 73 square meters)  •  Stage 4 Severe CKD (GFR = 15-29 mL/min/1 73 square meters)  •  Stage 5 End Stage CKD (GFR <15 mL/min/1 73 square meters)  Note: GFR calculation is accurate only with a steady state creatinine    Lipase [[id number]]  (Normal) Collected: 03/07/23 1427    Lab Status: Final result Specimen: Blood from Arm, Right Updated: 03/07/23 1450     Lipase 14 u/L     POCT pregnancy, urine [[id number]]  (Normal) Resulted: 03/07/23 1443    Lab Status: Final result Specimen: Urine Updated: 03/07/23 1443     EXT Preg Test, Ur Negative     Control Valid    CBC and differential [[id number]]  (Abnormal) Collected: 03/07/23 1427    Lab Status: Final result Specimen: Blood from Arm, Right Updated: 03/07/23 1434     WBC 14 85 Thousand/uL      RBC 3 60 Million/uL      Hemoglobin 11 1 g/dL      Hematocrit 34 2 %      MCV 95 fL      MCH 30 8 pg      MCHC 32 5 g/dL      RDW 13 6 %      MPV 9 6 fL      Platelets 310 Thousands/uL      nRBC 0 /100 WBCs      Neutrophils Relative 77 %      Immat GRANS % 1 %      Lymphocytes Relative 13 %      Monocytes Relative 6 %      Eosinophils Relative 2 %      Basophils Relative 1 %      Neutrophils Absolute 11 63 Thousands/µL      Immature Grans Absolute 0 07 Thousand/uL      Lymphocytes Absolute 1 92 Thousands/µL      Monocytes Absolute 0 86 Thousand/µL      Eosinophils Absolute 0 29 Thousand/µL      Basophils Absolute 0 08 Thousands/µL                  US pelvis complete w transvaginal   Final Result by Dillon Feng MD (03/07 1836)      Both ovaries are enlarged, right greater than left, and contain multiple hemorrhagic follicles  Although this appearance may be secondary to the underlying polycystic ovarian syndrome, the ovaries are significantly enlarged compared to March 2022  Ovarian hyperstimulation syndrome (OHSS) is possible, although the ovaries are not as large as typical of OHSS  No evidence of ovarian torsion  Small amount of free pelvic fluid in the pelvis containing low level internal echoes, likely representing blood products, however infected ascites is not excluded  No organized collections  Unremarkable sonographic appearance of the uterus  The study was marked in [facility] for immediate notification  Workstation performed: [id number]         XR chest 2 views   ED Interpretation by Efren Johnson PA-C (03/07 1824)   Patchy infiltrate in RML      CT abdomen pelvis with contrast   Final Result by Greg Cid MD (03/07 1618)      Moderately enlarged ovaries bilaterally with multiple hemorrhagic cysts  Ovaries are not quite as large as is often seen in patients with ovarian hyperstimulation syndrome (OHSS), but the findings may represent a mild form of that diagnosis in this    patient who has recently undergone infertility treatments  Small-to-moderate volume of mixed density free pelvic fluid consistent with a recently ruptured hemorrhagic ovarian cyst             This examination was marked "immediate notification" in Epic in order to begin the standard process by which the radiology reading room liaison alerts the referring practitioner  Workstation performed: [id number]                    Procedures  Procedures         ED Course  ED Course as of 03/07/23 2137   Tue Mar 07, 2023   1633 Moderately enlarged ovaries bilaterally with multiple hemorrhagic cysts  Ovaries are not quite as large as is often seen in patients with ovarian hyperstimulation syndrome (OHSS), but the findings may represent a mild form of that diagnosis in this   patient who has recently undergone infertility treatments      Small-to-moderate volume of mixed density free pelvic fluid consistent with a recently ruptured hemorrhagic ovarian cyst     Will order pelvic ultrasound for further evaluation    1646 Patchy and groundglass airspace opacity seen in the medial base of the right middle lobe only on images 1 through 5 suspicious for some combination of atelectasis and/or pneumonia      Patient admits that she has had some pain in the right lower chest and admits to taking less deep breaths  Denies cough or fevers  1850 [facility] on call regarding ultrasound findings   1951 Dr Thien Ramírez recommends discussing case with patient's fertility doctor - seems like a mild case of OHSS    2010 Discussed with Dr Guy Florez from [facility] - read her the ultrasound and she states that sounds like a completely normal ultrasound for post egg retrieval  Will plan to see her in the clinic tomorrow or Thursday               Medical Decision Making  29 y/o female here for LLQ abdominal pain s/p IVF egg retrieval 2/23  Differential diagnosis: intraabdominal infection, diverticulitis, small bowel perforation, colitis, SBO, pregnancy, UTI  Assessment: ovarian hyperstimulation syndrome, free fluid in pelvis, abdominal pain, pneumonia  Plan: see ED course  Patient started on amoxicillin x 7 days for pneumonia  Her infertility clinic will reach out to her to schedule an appointment for tomorrow or Thursday regarding the OHSS  She was given care instructions for pneumonia  She  was given strict return to ER precautions both verbally and in discharge papers  Patient verbalized understanding and agrees with plan  Amount and/or Complexity of Data Reviewed  Labs: ordered  Radiology: ordered and independent interpretation performed  Risk  Prescription drug management            Disposition  Final diagnoses:   Abdominal pain   Ovarian hyperstimulation syndrome   Free fluid in pelvis   Pneumonia     Time reflects when diagnosis was documented in both MDM as applicable and the Disposition within this note     Time User Action Codes Description Comment    3/7/2023  8:14 PM Balbina Noland [R10 9] Abdominal pain     3/7/2023  8:14 PM Balbina Noland [N98 1] Ovarian hyperstimulation syndrome     3/7/2023  8:15 PM Prashanth Alvaro Add [R18 8] Free fluid in pelvis     3/7/2023  8:15 PM Prashanth Alvaro Add [J18 9] Pneumonia       ED Disposition     ED Disposition   Discharge    Condition   Stable    Date/Time Tue Mar 7, 2023  8:14 PM    Comment   Kathe Herediashaw discharge to home/self care  Follow-up Information     Follow up With Specialties Details Why Contact Info Additional Information    [facility]  Schedule an appointment as soon as possible for a visit         [facility] Emergency Department Emergency Medicine Go to  if you develop intense abdominal pain that is worse or different than before, abdomen becomes hard or rigid, blood in vomit, urine, stool, abnormal vaginal discharge, fevers, chest pain, shortness of breath/difficulty breathing [address] Emergency Department, [address] [facility]          Discharge Medication List as of 3/7/2023  8:19 PM      START taking these medications    Details   amoxicillin (AMOXIL) 500 mg capsule Take 1 capsule (500 mg total) by mouth 3 (three) times a day for 7 days, Starting Tue 3/7/2023, Until Tue 3/14/2023, Normal         CONTINUE these medications which have NOT CHANGED    Details   citalopram (CeleXA) 10 mg tablet TAKE 1 TABLET BY MOUTH EVERY DAY, Normal      levothyroxine 75 mcg tablet Take 1 tablet 6 days a week and 1 5 tablets on Sunday , Normal      Prenatal Vit-Fe Fumarate-FA (PRENATAL VITAMINS PO) Take 1 tablet by mouth daily, Historical Med             No discharge procedures on file      PDMP Review     None          ED Provider  Electronically Signed by           Abilio Dennis PA-C  03/07/23 1171

## 2023-03-08 NOTE — DISCHARGE INSTRUCTIONS
Take 500 mg amoxicillin 3 times a day for one week for pneumonia  Take tylenol every 4-6 hours as needed for pain     Avoid sex or heavy physical activity   Schedule a follow up with your fertility clinic as soon as possible, they should call you to schedule an appointment  Return to the ER if you develop intense abdominal pain that is worse or different than before, abdomen becomes hard or rigid, blood in vomit, urine, stool, abnormal vaginal discharge, fevers, chest pain, shortness of breath/difficulty breathing

## 2023-03-22 ENCOUNTER — OFFICE VISIT (OUTPATIENT)
Dept: ENDOCRINOLOGY | Facility: HOSPITAL | Age: 33
End: 2023-03-22

## 2023-03-22 VITALS
WEIGHT: 200.4 LBS | BODY MASS INDEX: 32.21 KG/M2 | HEART RATE: 78 BPM | SYSTOLIC BLOOD PRESSURE: 120 MMHG | DIASTOLIC BLOOD PRESSURE: 70 MMHG | HEIGHT: 66 IN

## 2023-03-22 DIAGNOSIS — E66.9 OBESITY (BMI 30.0-34.9): ICD-10-CM

## 2023-03-22 DIAGNOSIS — E28.2 PCOS (POLYCYSTIC OVARIAN SYNDROME): ICD-10-CM

## 2023-03-22 DIAGNOSIS — E03.9 ACQUIRED HYPOTHYROIDISM: Primary | ICD-10-CM

## 2023-03-22 RX ORDER — NORETHINDRONE AND ETHINYL ESTRADIOL 1 MG-35MCG
KIT ORAL
COMMUNITY
Start: 2023-02-28

## 2023-03-22 RX ORDER — LEVOTHYROXINE SODIUM 0.07 MG/1
TABLET ORAL
Qty: 60 TABLET | Refills: 1 | Status: SHIPPED | OUTPATIENT
Start: 2023-03-22

## 2023-03-22 RX ORDER — ESTRADIOL 2 MG/1
TABLET ORAL
COMMUNITY
Start: 2023-02-28

## 2023-03-22 NOTE — PROGRESS NOTES
Established Patient Progress Note       Chief Complaint   Patient presents with   • Hypothyroidism        History of Present Illness:     Jose Ramon Linton is a 28 y o  female with a history of PCOS, hypothyroidism who previously was seen by Bi Rogers, presents today for transfer of care  All previous pertinent notes, labs and imaging reviewed prior to visit  Hypothyroidism:- Patient was dx 4-5 years ago and started on low dose levothyroxine and slowly dose escalated to 75mcg daily  Last dose adjustment 05/22 when dose was increased from 50mcg 6 days a week and 1 5 tablet on Sunday to 75mcg 6 days and 1 5 tablet on sunday  Last TSH 03/23 2  13 with Free t4 1 1 while on this dose  Patient currently feels overwhelmed after undergoing IVF treatment last month and has egg retrieval on Feb 22 complicated with multiple hemorrhagic cysts causing GI issues/abdominal bloating  Patient started on OCP after procedure and will be starting estrace in a few weeks in prep for Embryo transfer on April 19 2023  Patient reports weight is stable but unable to lose weight, had constipation but d/t IVF procedure but other otherwise, always has dry skin and hair loss- not changed  Takes levothyroxine first thing in AM, empty stomach and waits 30 mins before food  Takes her prenatal later    Patient has also complained of having irregular menses since past couple of years  Indicates menses were more regular when was on vegan diet  She also reported other symptoms of mild hirsutism with facial acne and hair growth primarily on face  Patient had workup for this in 2021 which showed normal DHEA-s, Prolactin, Thyroid, 17 hydroxyprogrestrone with mildly elevated testosterone at 50 and recent US pelvis finding of multiple hemorrhagic cysts in b/l ovaries  Based on this Dx with PCOS  Currently menses:- on OCP, periods previously regulated only when on OCP    Symptoms of hyperandrogenism- Reports plucking facial hair every other day, has acne on face but not severe  Reporting increased hair growth on side of thigh at well  Never tried on aldactone  Fertility- Underwent IVF egg retrieval in Feb, and will be having embryo transplant in April  Lupron used for ovulation induction  Last HbA1C:-  5 9%, Last Lipid:- None  Weight:- Stable for now, but would love to lose weight  Not following any diet program    Social Hx:- Quit smoking Mook, rare alcohol use, no other drug use, works as a behaviour health therapist  Family Hx:- mother and sister have hypothyroidism     Patient Active Problem List   Diagnosis   • Hypothyroidism   • Mild episode of recurrent major depressive disorder ([address] )   • Nicotine abuse   • Obesity (BMI 30 0-34  9)   • PCOS (polycystic ovarian syndrome)      Past Medical History:   Diagnosis Date   • Hypothyroidism       No past surgical history on file  Family History   Problem Relation Age of Onset   • Thyroid disease unspecified Mother    • Heart disease Mother    • Stroke Mother    • Hyperthyroidism Mother    • Diabetes Brother    • No Known Problems Father    • Colon cancer Cousin      Social History     Tobacco Use   • Smoking status: Some Days     Packs/day: 0 25     Types: Cigarettes     Last attempt to quit: 3/17/2022     Years since quittin 0   • Smokeless tobacco: Never   • Tobacco comments:     2 cig a day since age 15   Substance Use Topics   • Alcohol use: Yes     No Known Allergies    Current Outpatient Medications:   •  citalopram (CeleXA) 10 mg tablet, TAKE 1 TABLET BY MOUTH EVERY DAY, Disp: 90 tablet, Rfl: 1  •  levothyroxine 75 mcg tablet, Take 1 tablet 6 days a week and 1 5 tablets on   , Disp: 96 tablet, Rfl: 1  •  Prenatal Vit-Fe Fumarate-FA (PRENATAL VITAMINS PO), Take 1 tablet by mouth daily, Disp: , Rfl:     Review of Systems   Constitutional: Positive for fatigue  Negative for unexpected weight change  HENT: Negative for trouble swallowing and voice change      Eyes: Negative for photophobia and visual disturbance  Respiratory: Negative for choking and shortness of breath  Gastrointestinal: Positive for constipation  Negative for diarrhea  Endocrine: Negative for cold intolerance and heat intolerance  Musculoskeletal: Negative for arthralgias and myalgias  Skin: Negative for rash  Physical Exam:  There is no height or weight on file to calculate BMI  LMP 02/28/2023    Wt Readings from Last 3 Encounters:   02/07/23 92 8 kg (204 lb 9 6 oz)   11/17/22 89 4 kg (197 lb 3 2 oz)   08/02/22 87 kg (191 lb 12 8 oz)       Physical Exam  Constitutional:       Appearance: Normal appearance  She is obese  Cardiovascular:      Rate and Rhythm: Normal rate and regular rhythm  Pulses: Normal pulses  Pulmonary:      Effort: Pulmonary effort is normal    Abdominal:      General: Abdomen is flat  Bowel sounds are normal       Palpations: Abdomen is soft  Skin:     General: Skin is warm and dry  Capillary Refill: Capillary refill takes less than 2 seconds  Neurological:      General: No focal deficit present  Mental Status: She is alert and oriented to person, place, and time     Psychiatric:         Mood and Affect: Mood normal          Labs:      Latest Reference Range & Units 10/25/21 07:15 11/19/21 07:03 01/08/22 10:06 05/19/22 08:25 06/30/22 07:03 11/14/22 12:05 01/10/23 07:08 03/07/23 07:23   TSH, POC mIU/L 4 87 (H) 2 09 0 86 3 05 1 32 1 93 2 69 2 13   Free T4 0 8 - 1 8 ng/dL 1 0 1 2 1 2 1 2 1 3 1 1 1 1 1 1   (H): Data is abnormally high     Latest Reference Range & Units Most Recent   Testosterone, Total, LC/MS 2 - 45 ng/dL 50 (H)  10/25/21 07:15   TESTOSTERONE FREE 0 1 - 6 4 pg/mL 4 4  10/25/21 07:15   (H): Data is abnormally high     Latest Reference Range & Units Most Recent   17-OH PROGESTERONE see note ng/dL 39  10/25/21 07:15   DHEA-SO4 23 - 266 mcg/dL 125  10/25/21 07:15   LUTEINIZING HORMONE mIU/mL 5 1  10/25/21 07:15   FSH, POC mIU/mL 6 4  10/25/21 07:15   PROLACTIN ng/mL 9 3  10/25/21 07:15       US pelvis   PELVIC ULTRASOUND, COMPLETE     INDICATION:  abnormal ovaries on exam   As per review of electronic medical record,  patient with history of PCOS status post IVF egg retrieval on 2/23/2023 presenting with left lower quadrant pain      COMPARISON: Pelvic ultrasound from 3/21/2022 and CT of the abdomen and pelvis from earlier today      TECHNIQUE:   Transabdominal pelvic ultrasound was performed in sagittal and transverse planes with a curvilinear transducer  Additional transvaginal imaging was performed to better evaluate the endometrium and ovaries  Imaging included volumetric   sweeps as well as traditional still imaging technique      FINDINGS:      UTERUS:  The uterus is anteverted in position and normal in size, measuring 7 6 x 3 4 x 4 6 cm  Contour and echotexture appear normal   The cervix shows no suspicious abnormality      ENDOMETRIUM:    Normal caliber of 7 mm  Homogeneous and normal in appearance      OVARIES/ADNEXA:  The right ovary measures 7 5 x 4 5 x 4 6 cm, with a volume of 81 1 mL  The left ovary measures 4 3 x 3 8 x 4 9 cm, with a volume of 42 5 mL  The ovarian volume is increased compared to the prior study from March 2022 where the right ovary had a volume of 10 8 mL in the left ovary had a volume of 9 3 mL  Multiple hemorrhagic follicles are seen in both ovaries  Doppler flow within normal limits      Small amount of free pelvic fluid is seen in the pelvis containing low level internal echoes, likely representing blood products  No organized fluid collections      IMPRESSION:     Both ovaries are enlarged, right greater than left, and contain multiple hemorrhagic follicles  Although this appearance may be secondary to the underlying polycystic ovarian syndrome, the ovaries are significantly enlarged compared to March 2022      Ovarian hyperstimulation syndrome (OHSS) is possible, although the ovaries are not as large as typical of OHSS      No evidence of ovarian torsion  Small amount of free pelvic fluid in the pelvis containing low level internal echoes, likely representing blood products, however infected ascites is not excluded  No organized collections      Unremarkable sonographic appearance of the uterus      The study was marked in EPIC for immediate notification  Impression & Plan:    Problem List Items Addressed This Visit        Endocrine    Hypothyroidism - Primary    Relevant Orders    T4, free    TSH, 3rd generation    PCOS (polycystic ovarian syndrome)    Relevant Orders    Hemoglobin A1C    Lipid panel       Orders Placed This Encounter   Procedures   • Hemoglobin A1C     Standing Status:   Future     Number of Occurrences:   1     Standing Expiration Date:   3/22/2024   • Lipid panel     This is a patient instruction: This test requires patient fasting for 10-12 hours or longer  Drinking of black coffee or black tea is acceptable  Standing Status:   Future     Number of Occurrences:   1     Standing Expiration Date:   3/22/2024   • T4, free     Standing Status:   Future     Number of Occurrences:   1     Standing Expiration Date:   3/22/2024   • TSH, 3rd generation     This is a patient instruction: This test is non-fasting  Please drink two glasses of water morning of bloodwork  Standing Status:   Future     Number of Occurrences:   1     Standing Expiration Date:   3/22/2024       There are no Patient Instructions on file for this visit  Patient is a 33y F with subclinical hypothyroidism and PCOS who presents today for follow up    1) Hypothyroidism:- Patients most recent TSH is at goal at 2 13 which is optimal for pregnancy goals while on 75mcg 6 days a week and 1 5 tablet on sunday levothyroxine  Patient currently feels awful but attributes this to complicated IVF procedure  No other symptoms of overt hypothyroidism  Recommend repeat TFT in 6 months for now   Reviewed if does get pregnant needs to inform us to adjust levothyroxine dose and will need sooner f/u and repeat TSH/Free t4 sooner than  2) PCOS:-   Menses- Regular when on OCP, currently on OCP  Will stop for IVF tx  Hyperandrogenism- mild symptoms, discussed aldactone use however given undergoing fertility cannot be used right now, can consider this in future  Metabolic profile- Will obtain HbA1C and Lipid for screening- if normal repeat in 2-3 years  Discussed at higher risk for GDM if becomes pregnant, therefore work on low carb diet to start with to help with weight and also reduce risk of GDM  Discussed that weight loss could reverse PCOS  3) Class 1 obesity- Current BMI >30 w comorbid condition of PCOS qualifies for weight loss drug which we cannot persue currently given will be undergoing fertility Tx  Did discussed weight loss as primary Tx plan for PCOS as well  Therefore for now she will work on this with diet/exercise  RTC in 6 months or sooner for hypothyroidism management if becomes pregnant  Discussed with the patient and all questioned fully answered  She will call me if any problems arise  I have spent a total time of 40 minutes on 03/22/23 in caring for this patient including Instructions for management, Patient and family education, Impressions and Reviewing / ordering tests, medicine, procedures        Counseled patient on diagnostic results, prognosis, risk and benefit of treatment options, instruction for management, importance of treatment compliance, Risk  factor reduction and impressions      Corky Samayoa MD

## 2023-05-30 ENCOUNTER — OFFICE VISIT (OUTPATIENT)
Dept: FAMILY MEDICINE CLINIC | Facility: HOSPITAL | Age: 33
End: 2023-05-30

## 2023-05-30 VITALS
TEMPERATURE: 96.6 F | SYSTOLIC BLOOD PRESSURE: 108 MMHG | BODY MASS INDEX: 31.76 KG/M2 | DIASTOLIC BLOOD PRESSURE: 82 MMHG | HEIGHT: 66 IN | HEART RATE: 74 BPM | WEIGHT: 197.6 LBS

## 2023-05-30 DIAGNOSIS — R21 RASH: Primary | ICD-10-CM

## 2023-05-30 RX ORDER — PREDNISONE 10 MG/1
TABLET ORAL
Qty: 39 TABLET | Refills: 0 | Status: SHIPPED | OUTPATIENT
Start: 2023-05-30

## 2023-05-30 NOTE — PROGRESS NOTES
Name: Alem Peraza      : 1990      MRN: [id number]  Encounter Provider: Shawn Walsh DO  Encounter Date: 2023   Encounter department: [phone]     Assessment & Plan     1  Rash  Comments:  Face and chin clearly c/w contact dermatitis - likely poison ivy, small papules scattered else where more nonspecific, discussed poss bed bugs or poss flea bites but pt notes no other household contacts with similar rash, will try trial of prednisone (SE reviewed) d/t location on face/around eye and if not better would need further Derm eval - call with F/C/new/worse symptoms  Orders:  -     predniSONE 10 mg tablet; 3 tab PO bid x 3 days then 2 tab PO bid x 3 days then 1 tab PO bid x 3 days then tab PO q day x 3 days then STOP           Subjective      HPI Pt here for an acute visit - rash    She was doing yard work last weekend and then last Wed woke up with an itchy red rash on R upper eye lid and lower R jaw/neck  She notes a few more red tiny bumps onher distal forearm B/L and B/L ankles  The bumps have appeared on back/chest/abd as well  They will scab up from scratching a lot  She notes no new new meds/soaps/lotions/detergents/new pets and denies any other house hold contacts with similar rash  She has tried OTC Benadryl cream and tablets for the facial rash  Review of Systems   Constitutional: Negative for chills and fever  Eyes: Negative for pain, discharge and redness  Respiratory: Negative for cough and shortness of breath  Gastrointestinal: Negative for diarrhea and nausea  Skin: Positive for rash  Negative for wound  Neurological: Negative for dizziness, light-headedness and headaches  Psychiatric/Behavioral: Negative for confusion         Current Outpatient Medications on File Prior to Visit   Medication Sig   • Alyacen  1-35 MG-MCG per tablet TAKE 1 ACTIVE TABLET BY MOUTH DAILY, PATIENT IS NOT TO TAKE PLACEBOS   • citalopram "(CeleXA) 10 mg tablet TAKE 1 TABLET BY MOUTH EVERY DAY   • estradiol (ESTRACE) 2 MG tablet TAKE 1 TAB BY MOUTH 3 TIMES A DAY ORALLY AND INSERT ONE TABLET VAGINALLY TWICE A DAY (Patient not taking: Reported on 3/22/2023)   • levothyroxine 75 mcg tablet Take 1 tablet 6 days a week and 1 5 tablets on Sunday  • Prenatal Vit-Fe Fumarate-FA (PRENATAL VITAMINS PO) Take 1 tablet by mouth daily       Objective     /82   Pulse 74   Temp (!) 96 6 °F (35 9 °C) (Tympanic)   Ht 5' 6\" (1 676 m)   Wt 89 6 kg (197 lb 9 6 oz)   BMI 31 89 kg/m²     Physical Exam  Vitals and nursing note reviewed  Constitutional:       General: She is not in acute distress  Appearance: She is not ill-appearing  HENT:      Head: Normocephalic and atraumatic  Eyes:      General:         Right eye: No discharge  Left eye: No discharge  Conjunctiva/sclera: Conjunctivae normal    Pulmonary:      Effort: Pulmonary effort is normal  No respiratory distress  Skin:     Coloration: Skin is not pale  Findings: Rash present  Comments: Mildly erythematous plaque with some papules R upper eye lid, linear papules at R chin region, small papules scattered on ankles/forearms/arm/lower back and L ant chest wall   Psychiatric:         Behavior: Behavior normal          Thought Content:  Thought content normal          Judgment: Judgment normal        Selene Pitts DO  "

## 2023-06-13 DIAGNOSIS — E03.9 ACQUIRED HYPOTHYROIDISM: ICD-10-CM

## 2023-06-13 RX ORDER — LEVOTHYROXINE SODIUM 0.07 MG/1
TABLET ORAL
Qty: 60 TABLET | Refills: 0 | Status: SHIPPED | OUTPATIENT
Start: 2023-06-13

## 2023-06-15 ENCOUNTER — OFFICE VISIT (OUTPATIENT)
Dept: FAMILY MEDICINE CLINIC | Facility: HOSPITAL | Age: 33
End: 2023-06-15
Payer: COMMERCIAL

## 2023-06-15 VITALS
HEART RATE: 80 BPM | TEMPERATURE: 98.5 F | SYSTOLIC BLOOD PRESSURE: 120 MMHG | OXYGEN SATURATION: 99 % | WEIGHT: 198.6 LBS | HEIGHT: 66 IN | BODY MASS INDEX: 31.92 KG/M2 | DIASTOLIC BLOOD PRESSURE: 66 MMHG

## 2023-06-15 DIAGNOSIS — J04.0 LARYNGITIS: Primary | ICD-10-CM

## 2023-06-15 DIAGNOSIS — B34.9 VIRAL ILLNESS: ICD-10-CM

## 2023-06-15 PROCEDURE — 99213 OFFICE O/P EST LOW 20 MIN: CPT | Performed by: NURSE PRACTITIONER

## 2023-06-15 NOTE — PROGRESS NOTES
Assessment/Plan:     I suspect viral cause of symptoms  Advised tea with honey, warm salt water gargle and voice rest    Call with worsening symptoms  Advised home COVID test in 1-2 days if respiratory symptoms worsen  Diagnoses and all orders for this visit:    Laryngitis    Viral illness          Subjective:     Patient ID: Rolf Hussein is a 28 y o  female  Lost voice and burning in throat started yesterday  Overnight started with cough  Yellow mucus  No fever or chills  Slight HA  No body aches  No nasal congestion, runny nose, post nasal drip  No abd pain/N/V/D  No sick contacts  Denies seasonal allergies  Throat is better today  She did not COVID test        Review of Systems   Constitutional: Negative for chills and fever  HENT: Positive for sore throat and voice change  Negative for congestion, ear pain, postnasal drip, rhinorrhea, sinus pressure and sinus pain  Respiratory: Positive for cough  Negative for shortness of breath and wheezing  Gastrointestinal: Negative for diarrhea, nausea and vomiting  Musculoskeletal: Negative for myalgias  Neurological: Positive for headaches  The following portions of the patient's history were reviewed and updated as appropriate: allergies, current medications, past family history, past medical history, past social history, past surgical history and problem list     Objective:  Vitals:    06/15/23 0855   BP: 120/66   Pulse: 80   Temp: 98 5 °F (36 9 °C)   SpO2: 99%      Physical Exam  Vitals reviewed  Constitutional:       Appearance: She is well-developed  HENT:      Head:      Comments: Voice is hoarse     Right Ear: Tympanic membrane and ear canal normal       Left Ear: Tympanic membrane and ear canal normal       Mouth/Throat:      Mouth: Mucous membranes are moist       Pharynx: Oropharynx is clear  No posterior oropharyngeal erythema  Tonsils: No tonsillar exudate     Cardiovascular:      Rate and Rhythm: Normal rate and regular rhythm  Heart sounds: Normal heart sounds  No murmur heard  Pulmonary:      Effort: Pulmonary effort is normal       Breath sounds: Normal breath sounds  Skin:     General: Skin is warm and dry  Neurological:      Mental Status: She is alert and oriented to person, place, and time     Psychiatric:         Mood and Affect: Mood normal          Behavior: Behavior normal

## 2023-07-03 LAB
EXTERNAL HEMATOCRIT: 36.7 %
EXTERNAL HEMOGLOBIN: 12.2 G/DL
EXTERNAL HEPATITIS B SURFACE ANTIGEN: NEGATIVE
EXTERNAL HEPATITIS B SURFACE ANTIGEN: NEGATIVE
EXTERNAL HIV SCREEN: NORMAL
EXTERNAL HIV-1 AB: NORMAL
EXTERNAL HIV-1 P24 ANTIGEN: NONREACTIVE
EXTERNAL HIV-1/2 AB-AG: NORMAL
EXTERNAL HIV-1/2 AB-AG: NORMAL
EXTERNAL HIV-2 AB: NORMAL
EXTERNAL PLATELET COUNT: 336 K/ÂΜL
EXTERNAL RUBELLA IGG QUANTITATION: NORMAL
EXTERNAL RUBELLA IGG QUANTITATION: NORMAL
EXTERNAL SYPHILIS TOTAL IGG/IGM SCREENING: NONREACTIVE
EXTERNAL SYPHILIS TOTAL IGG/IGM SCREENING: NONREACTIVE
HCV AB SER-ACNC: NORMAL

## 2023-07-04 DIAGNOSIS — F33.0 MILD EPISODE OF RECURRENT MAJOR DEPRESSIVE DISORDER (HCC): ICD-10-CM

## 2023-07-04 RX ORDER — CITALOPRAM HYDROBROMIDE 10 MG/1
TABLET ORAL
Qty: 90 TABLET | Refills: 1 | Status: SHIPPED | OUTPATIENT
Start: 2023-07-04

## 2023-08-17 DIAGNOSIS — E03.9 ACQUIRED HYPOTHYROIDISM: ICD-10-CM

## 2023-08-17 DIAGNOSIS — F33.0 MILD EPISODE OF RECURRENT MAJOR DEPRESSIVE DISORDER (HCC): ICD-10-CM

## 2023-08-17 RX ORDER — CITALOPRAM HYDROBROMIDE 10 MG/1
10 TABLET ORAL DAILY
Qty: 90 TABLET | Refills: 0 | Status: SHIPPED | OUTPATIENT
Start: 2023-08-17

## 2023-08-18 RX ORDER — LEVOTHYROXINE SODIUM 0.07 MG/1
TABLET ORAL
Qty: 60 TABLET | Refills: 0 | Status: SHIPPED | OUTPATIENT
Start: 2023-08-18

## 2023-09-05 ENCOUNTER — OFFICE VISIT (OUTPATIENT)
Dept: FAMILY MEDICINE CLINIC | Facility: HOSPITAL | Age: 33
End: 2023-09-05
Payer: COMMERCIAL

## 2023-09-05 VITALS
TEMPERATURE: 98.8 F | HEART RATE: 81 BPM | DIASTOLIC BLOOD PRESSURE: 68 MMHG | SYSTOLIC BLOOD PRESSURE: 124 MMHG | WEIGHT: 207.8 LBS | HEIGHT: 66 IN | BODY MASS INDEX: 33.4 KG/M2

## 2023-09-05 DIAGNOSIS — E03.9 ACQUIRED HYPOTHYROIDISM: ICD-10-CM

## 2023-09-05 DIAGNOSIS — Z3A.01 7 WEEKS GESTATION OF PREGNANCY: ICD-10-CM

## 2023-09-05 DIAGNOSIS — F33.0 MILD EPISODE OF RECURRENT MAJOR DEPRESSIVE DISORDER (HCC): Primary | ICD-10-CM

## 2023-09-05 PROCEDURE — 99214 OFFICE O/P EST MOD 30 MIN: CPT | Performed by: INTERNAL MEDICINE

## 2023-09-05 RX ORDER — LEVOTHYROXINE SODIUM 0.07 MG/1
TABLET ORAL
Qty: 60 TABLET | Refills: 0
Start: 2023-09-05

## 2023-09-05 RX ORDER — ESTRADIOL 2 MG/1
TABLET ORAL
COMMUNITY
Start: 2023-07-31

## 2023-09-05 RX ORDER — PROGESTERONE 50 MG/ML
INJECTION, SOLUTION INTRAMUSCULAR
COMMUNITY
Start: 2023-08-01

## 2023-09-05 NOTE — ASSESSMENT & PLAN NOTE
Currently 7 wks pregnant and just had levothyroxine accordingly adjusted by Endo, con't meds/labs and f/u as per Endo

## 2023-09-05 NOTE — ASSESSMENT & PLAN NOTE
Mood doing great and pt feels no changes in Celexa is needed, con't current regimen, call with new/worse symptoms

## 2023-09-05 NOTE — PROGRESS NOTES
Name: Messi Chappell      : 1990      MRN: [id number]  Encounter Provider: Giulia Hancock DO  Encounter Date: 2023   Encounter department: [address]     1. Mild episode of recurrent major depressive disorder [facility])  Assessment & Plan:  Mood doing great and pt feels no changes in Celexa is needed, con't current regimen, call with new/worse symptoms      2. Acquired hypothyroidism  Assessment & Plan:  Currently 7 wks pregnant and just had levothyroxine accordingly adjusted by Endo, con't meds/labs and f/u as per Endo    Orders:  -     levothyroxine 75 mcg tablet; Take 1 tablet 6 days a week and 2 tablets on . 3. 7 weeks gestation of pregnancy  Comments: On PNV/Estradiol/Progesterone as per fertility doc, needs to establish with OB, con't tx and f/u as per specialists, congratulated on her pregnancy         Subjective      HPI Pt here for follow up appt    Pt con't to take her Celexa daily as directed for depression. She notes no SE with the medication. She notes no down/sad mood and denies anxious/irritable mood. She is sleeping well. Pt saw Endo (Dr. Rosa Lugo) in March for f/u hypothyroidism - OV note reviewed. She had no changes to her levothyroxine made. Her last TSH 3/23 was good at 2.69. She has repeat BW ordered. She just found out she is pregnant (7 wks) and had her levothyroxine increased to 2 tab on Sun only and 1 tab all other days. She is still following with fertility office and has to establish with OB around 9-10 wks. She notes some mild nausea but it is tolerable. She is eating smaller/more frequent meals to help with the nausea. PAP     BW       Review of Systems   Constitutional: Negative for chills and fever. Respiratory: Negative for cough and shortness of breath. Gastrointestinal: Positive for nausea. Negative for abdominal pain. Skin: Negative for rash.    Neurological: Negative for dizziness and headaches. Psychiatric/Behavioral: Negative for dysphoric mood and sleep disturbance. The patient is not nervous/anxious. Current Outpatient Medications on File Prior to Visit   Medication Sig   • citalopram (CeleXA) 10 mg tablet Take 1 tablet (10 mg total) by mouth daily   • estradiol (ESTRACE) 2 MG tablet TAKE 1 TAB BY MOUTH 3 TIMES A DAY ORALLY AND INSERT ONE TABLET VAGINALLY TWICE A DAY   • Prenatal Vit-Fe Fumarate-FA (PRENATAL VITAMINS PO) Take 1 tablet by mouth daily   • progesterone 50 mg/mL injection INJECT 1 ML INTRAMUSCULARLY ONCE DAILY AS DIRECTED (WITH A RANGE OF  MG DAILY)   • [DISCONTINUED] levothyroxine 75 mcg tablet Take 1 tablet 6 days a week and 1.5 tablets on Sunday. Objective     /68   Pulse 81   Temp 98.8 °F (37.1 °C)   Ht 5' 6" (1.676 m)   Wt 94.3 kg (207 lb 12.8 oz)   BMI 33.54 kg/m²     Physical Exam  Vitals and nursing note reviewed. Constitutional:       General: She is not in acute distress. Appearance: She is not ill-appearing. HENT:      Head: Normocephalic and atraumatic. Eyes:      General:         Right eye: No discharge. Left eye: No discharge. Conjunctiva/sclera: Conjunctivae normal.   Pulmonary:      Effort: Pulmonary effort is normal. No respiratory distress. Skin:     Coloration: Skin is not pale. Findings: No rash. Psychiatric:         Mood and Affect: Mood normal.         Behavior: Behavior normal.         Thought Content:  Thought content normal.         Judgment: Judgment normal.       Giulia Hancock DO

## 2023-09-07 ENCOUNTER — TELEPHONE (OUTPATIENT)
Dept: OBGYN CLINIC | Facility: CLINIC | Age: 33
End: 2023-09-07

## 2023-09-07 NOTE — TELEPHONE ENCOUNTER
IVF through [facility], Records requested & being monitored. Will be 8 weeks on 9/8, being released 9/12 after Ultrasound study, had Genetic testing, Prenatal Labs, Pap. No prior pregnancies.

## 2023-09-20 ENCOUNTER — OFFICE VISIT (OUTPATIENT)
Dept: URGENT CARE | Facility: CLINIC | Age: 33
End: 2023-09-20
Payer: COMMERCIAL

## 2023-09-20 VITALS
HEART RATE: 61 BPM | RESPIRATION RATE: 18 BRPM | SYSTOLIC BLOOD PRESSURE: 112 MMHG | TEMPERATURE: 98.9 F | OXYGEN SATURATION: 100 % | DIASTOLIC BLOOD PRESSURE: 67 MMHG

## 2023-09-20 DIAGNOSIS — J02.9 SORE THROAT: Primary | ICD-10-CM

## 2023-09-20 LAB — S PYO AG THROAT QL: NEGATIVE

## 2023-09-20 PROCEDURE — 87880 STREP A ASSAY W/OPTIC: CPT | Performed by: FAMILY MEDICINE

## 2023-09-20 PROCEDURE — 99213 OFFICE O/P EST LOW 20 MIN: CPT | Performed by: FAMILY MEDICINE

## 2023-09-20 NOTE — PROGRESS NOTES
[facility]        NAME: Kevan Richmond is a 28 y.o. female  : 1990    MRN: [id number]  DATE: 2023  TIME: 6:56 PM    Assessment and Plan   Sore throat [J02.9]  1. Sore throat  POCT rapid strepA    Throat culture            Patient Instructions   Pt. 9 wk GA, awaiting throat culture prior to initiating abx therapy. Follow up with PCP in 3-5 days. Proceed to  ER if symptoms worsen. Chief Complaint     Chief Complaint   Patient presents with   • Sore Throat     Patient states about 5-6 days ago started with a sore throat and the cough really started yesterday. 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 Noticed her tonsils are swollen a white spot in back of throat. Patient is 9 weeks pregnant. History of Present Illness       60-year-old female currently 10 weeks gestational age presenting at this time with a 5-day history of sore throat and painful swallowing. Review of Systems   Review of Systems   Constitutional: Negative. HENT: Positive for sore throat. Eyes: Negative. Respiratory: Negative. Cardiovascular: Negative. Gastrointestinal: Negative. Genitourinary: Negative. Skin: Negative. Allergic/Immunologic: Negative. Neurological: Negative. Hematological: Negative. Psychiatric/Behavioral: Negative. Current Medications       Current Outpatient Medications:   •  citalopram (CeleXA) 10 mg tablet, Take 1 tablet (10 mg total) by mouth daily, Disp: 90 tablet, Rfl: 0  •  estradiol (ESTRACE) 2 MG tablet, TAKE 1 TAB BY MOUTH 3 TIMES A DAY ORALLY AND INSERT ONE TABLET VAGINALLY TWICE A DAY, Disp: , Rfl:   •  levothyroxine 75 mcg tablet, Take 1 tablet 6 days a week and 2 tablets on . , Disp: 60 tablet, Rfl: 0  •  Prenatal Vit-Fe Fumarate-FA (PRENATAL VITAMINS PO), Take 1 tablet by mouth daily, Disp: , Rfl:   •  progesterone 50 mg/mL injection, INJECT 1 ML INTRAMUSCULARLY ONCE DAILY AS DIRECTED (WITH A RANGE OF  MG DAILY), Disp: , Rfl:     Current Allergies     Allergies as of 09/20/2023   • (No Known Allergies)            The following portions of the patient's history were reviewed and updated as appropriate: allergies, current medications, past family history, past medical history, past social history, past surgical history and problem list.     Past Medical History:   Diagnosis Date   • Anxiety    • Hypothyroidism        No past surgical history on file. Family History   Problem Relation Age of Onset   • Thyroid disease unspecified Mother    • Heart disease Mother    • Stroke Mother    • Hyperthyroidism Mother    • Diabetes Brother    • No Known Problems Father    • Colon cancer Cousin          Medications have been verified. Objective   /67   Pulse 61   Temp 98.9 °F (37.2 °C) (Tympanic)   Resp 18   LMP 02/28/2023   SpO2 100%   Patient's last menstrual period was 02/28/2023. Physical Exam     Physical Exam  Vitals and nursing note reviewed. Constitutional:       Appearance: She is well-developed. HENT:      Head: Normocephalic. Mouth/Throat:      Pharynx: Pharyngeal swelling, oropharyngeal exudate and posterior oropharyngeal erythema present. Eyes:      Pupils: Pupils are equal, round, and reactive to light. Cardiovascular:      Rate and Rhythm: Normal rate. Pulmonary:      Effort: Pulmonary effort is normal.   Musculoskeletal:         General: Normal range of motion. Cervical back: Normal range of motion. Lymphadenopathy:      Cervical: Cervical adenopathy present. Skin:     General: Skin is warm and dry. Neurological:      Mental Status: She is alert and oriented to person, place, and time.

## 2023-09-23 LAB — B-HEM STREP SPEC QL CULT: NEGATIVE

## 2023-10-07 LAB
CHOLEST SERPL-MCNC: 183 MG/DL (ref 100–199)
CHOLEST/HDLC SERPL: 2.8 RATIO (ref 0–4.4)
EST. AVERAGE GLUCOSE BLD GHB EST-MCNC: 105 MG/DL
HBA1C MFR BLD: 5.3 % (ref 4.8–5.6)
HDLC SERPL-MCNC: 65 MG/DL
LDLC SERPL CALC-MCNC: 95 MG/DL (ref 0–99)
SL AMB VLDL CHOLESTEROL CALC: 23 MG/DL (ref 5–40)
T4 FREE SERPL-MCNC: 1.23 NG/DL (ref 0.82–1.77)
TRIGL SERPL-MCNC: 133 MG/DL (ref 0–149)
TSH SERPL DL<=0.005 MIU/L-ACNC: 3.02 UIU/ML (ref 0.45–4.5)

## 2023-10-10 ENCOUNTER — INITIAL PRENATAL (OUTPATIENT)
Dept: OBGYN CLINIC | Facility: CLINIC | Age: 33
End: 2023-10-10
Payer: COMMERCIAL

## 2023-10-10 ENCOUNTER — OFFICE VISIT (OUTPATIENT)
Dept: ENDOCRINOLOGY | Facility: HOSPITAL | Age: 33
End: 2023-10-10
Payer: COMMERCIAL

## 2023-10-10 ENCOUNTER — INITIAL PRENATAL (OUTPATIENT)
Dept: OBGYN CLINIC | Facility: CLINIC | Age: 33
End: 2023-10-10

## 2023-10-10 ENCOUNTER — PATIENT OUTREACH (OUTPATIENT)
Dept: OBGYN CLINIC | Facility: CLINIC | Age: 33
End: 2023-10-10

## 2023-10-10 VITALS
WEIGHT: 208 LBS | DIASTOLIC BLOOD PRESSURE: 68 MMHG | SYSTOLIC BLOOD PRESSURE: 106 MMHG | BODY MASS INDEX: 33.43 KG/M2 | HEIGHT: 66 IN

## 2023-10-10 VITALS
DIASTOLIC BLOOD PRESSURE: 70 MMHG | BODY MASS INDEX: 33.14 KG/M2 | HEIGHT: 66 IN | HEART RATE: 63 BPM | WEIGHT: 206.2 LBS | SYSTOLIC BLOOD PRESSURE: 100 MMHG

## 2023-10-10 DIAGNOSIS — O99.280 HYPOTHYROID IN PREGNANCY, ANTEPARTUM: Primary | ICD-10-CM

## 2023-10-10 DIAGNOSIS — E28.2 PCOS (POLYCYSTIC OVARIAN SYNDROME): ICD-10-CM

## 2023-10-10 DIAGNOSIS — E03.9 HYPOTHYROID IN PREGNANCY, ANTEPARTUM: Primary | ICD-10-CM

## 2023-10-10 DIAGNOSIS — Z3A.12 PREGNANCY WITH 12 COMPLETED WEEKS GESTATION: ICD-10-CM

## 2023-10-10 DIAGNOSIS — F41.9 ANXIETY IN PREGNANCY, ANTEPARTUM: ICD-10-CM

## 2023-10-10 DIAGNOSIS — Z36.87 UNSURE OF LMP (LAST MENSTRUAL PERIOD) AS REASON FOR ULTRASOUND SCAN: ICD-10-CM

## 2023-10-10 DIAGNOSIS — Z36.89 ENCOUNTER FOR OTHER SPECIFIED ANTENATAL SCREENING: Primary | ICD-10-CM

## 2023-10-10 DIAGNOSIS — O99.340 ANXIETY IN PREGNANCY, ANTEPARTUM: ICD-10-CM

## 2023-10-10 DIAGNOSIS — E03.9 ACQUIRED HYPOTHYROIDISM: Primary | ICD-10-CM

## 2023-10-10 DIAGNOSIS — Z3A.12 12 WEEKS GESTATION OF PREGNANCY: ICD-10-CM

## 2023-10-10 DIAGNOSIS — O99.211 OBESITY AFFECTING PREGNANCY IN FIRST TRIMESTER, UNSPECIFIED OBESITY TYPE: ICD-10-CM

## 2023-10-10 LAB
EXTERNAL CHLAMYDIA SCREEN: NEGATIVE
EXTERNAL GONORRHEA SCREEN: NEGATIVE

## 2023-10-10 PROCEDURE — 99214 OFFICE O/P EST MOD 30 MIN: CPT | Performed by: STUDENT IN AN ORGANIZED HEALTH CARE EDUCATION/TRAINING PROGRAM

## 2023-10-10 PROCEDURE — PNV: Performed by: OBSTETRICS & GYNECOLOGY

## 2023-10-10 PROCEDURE — 76817 TRANSVAGINAL US OBSTETRIC: CPT | Performed by: OBSTETRICS & GYNECOLOGY

## 2023-10-10 NOTE — PROGRESS NOTES
OB INTAKE INTERVIEW  Patient is 35 y. o.who presents for OB intake at 12.4wks  Pregnancy via IVF with donor sperm at [facility]  She is accompanied by: Umer Mares  The father of her baby (IS`) is involved in the pregnancy       Last Menstrual Period:   Ultrasound: Measured 12 weeks 4 days on 10/10/26082  Estimated Date of Delivery: 2024 confirmed by ultrasound. Signs/Symptoms of Pregnancy  Current pregnancy symptoms: yes  Constipation no  Headaches YES-occassional typically recommend B2 and magnesium  Cramping/spotting no  PICA cravings no    Diabetes-  There is no height or weight on file to calculate BMI. If patient has 1 or more, please order early 1 hour GTT  History of GDM no  BMI >30 YES  History of PCOS or current metformin use YES  History of LGA/macrosomic infant (4000g/9lbs) no    If patient has 2 or more, please order early 1 hour GTT  AMA No  First degree relative with type 2 diabetes no  History of chronic HTN, hyperlipidemia, elevated A1C no  High risk race (, , ,  or ) no    Hypertension- if you answer yes, please order preeclampsia labs (cbc, comprehensive metabolic panel, urine protein creatinine ratio, uric acid)  History of of chronic HTN no  History of gestational HTN no  History of preeclampsia, eclampsia, or HELLP syndrome no  History of diabetes no  History of lupus, autoimmune disease, kidney disease, antiphospholipid syndrome, rheumatoid arthritis, sjogren's syndrome no    Thyroid- if yes order TSH with reflex T4 (Unless TSH value on file within last 4 weeks then do not need to order)  History of thyroid disease YES-saw Dr Shalonda Lin today, monitoring levels monthly    Bleeding Disorder or Hx of DVT-patient or first degree relative with history of. Order the following if not done previously.    (Factor V, antithrombin III, prothrombin gene mutation, protein C and S Ag, lupus anticoagulant, anticardiolipin, beta-2 glycoprotein)   no    OB/GYN-  History of abnormal pap smear no  History of HPV no  History of Herpes/HSV no  History of other STI (gonorrhea, chlamydia, trich) (or current partner with Hep B, Hep C, or HIV) no  History of prior  no  History of prior  no  History of  delivery prior to 36 weeks 6 days no  History of blood transfusion no  Ok for blood transfusion YES    Substance screening-   History of tobacco use no  Currently using tobacco no  Currently using alcohol no  Presently using drugs no  Past drug use (if yes, order urine drug screening panel)  no  IV drug use (if yes, order urine drug screening panel) no  Partner drug use (if yes, order urine drug screening panel) no  Parent/Family drug use (do not order urine drug screening panel just for family hx) no    Depression Screening-  PHQ-9 Score: (2) follows with PCP, on celexa for anxiety    MRSA Screening-   Does the pt have a hx of MRSA? no  If yes- please follow MRSA protocol and obtain a nasal swab for MRSA culture at 12 week visit. Immunizations:  Influenza vaccine given this season-Flu vaccine available at [facility]  Discussed Tdap vaccine-Recommended at 28 weeks. Discussed COVID Vaccine (request pt upload card or bring to next visit)Yes-moderna    Genetic/MFM-  Do you or your partner have a history of any of the following in yourselves or first degree relatives? Cystic fibrosis no  Spinal muscular atrophy no  Hemoglobinopathy/Sickle Cell/Thalassemia no  Fragile X Intellectual Disability no    If yes, discuss carrier screening and recommend consultation with MFM/genetic counseling. If no, discuss option for carrier screening and/or genetic testing with Nuchal Ultrasound. Patient interested YES  Appointment at [facility] made Referral MFM  Genetic screening done prior to IVF with [facility]. MOB + carrier of  necrotic syndrome.  MOB will obtain sperm donor genetic screening for review      Interview education  [facility] Pregnancy Essentials Book reviewed and discussed Wilfrido Mejia provided in [facility]      Prenatal lab work scripts YES-Gc/CT only. See media section for prenatal labs done through [facility]    Extra labs ordered:  Early 1hr gtt    The patient has a history now or in prior pregnancy notable for:  [facility]  transfer at 12w. 4d. IVF with donor sperm on 8/2/2023. H/O hypothyroid follows with [facility], H/O anxiety managed by PCP. Genetic screening done prior to IVF. Mom carrier for necrotic syndrome. Mom obtaining donor sperm results. See lab results in Media   Needs GC/CT, Blood type, screen and early 1hr gtt. The patient was oriented to our practice, reviewed delivering physicians and [address] in [place] for Delivery. All questions were answered.     Interviewed byWolf Villarreal LPN

## 2023-10-10 NOTE — PATIENT INSTRUCTIONS
Congratulations!! Please review our Pregnancy Essential Guide for informative education and here is a link to take a [facility] tour of our [address].      [facility] Pregnancy Essentials Guide  [facility] ([email or web address])       [facility] - Women and [address] [place] on [facility]
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
## 2023-10-10 NOTE — PROGRESS NOTES
Established Patient Progress Note       Chief Complaint   Patient presents with    Hypothyroidism      History of Present Illness:     Svitlana Meyers is a 35 y.o. female with a history of PCOS, hypothyroidism who presents today for follow up. Last visit 03/23. Currently 12 weeks pregnant through IVF    Hypothyroidism:- Patient was dx 4-5 years ago and started on low dose levothyroxine and slowly dose escalated to 75mcg daily. Last dose adjustment 05/22 when dose was increased from 50mcg 6 days a week and 1.5 tablet on Sunday to 75mcg 6 days and 1.5 tablet on sunday. Since last visit, patient got pregnant and hence increased her medications slightly to taking 75mcg 6 days a week and 2 tab on Sunday. Thyroid labs since shows 10/23 3.020, with Free T4 1.23. Patient currently feels tired but otherwise feels well, denies any constipation, dry skin, brittle nails, hair loss. PCOS:- Hx of irregular menses since past couple of years. Indicates menses were more regular when was on vegan diet, with mild hirsutism with facial acne and hair growth primarily on face. Workup in past normal DHEA-s, Prolactin, Thyroid, 17 hydroxyprogrestrone with mildly elevated testosterone at 50 and recent US pelvis finding of multiple hemorrhagic cysts in b/l ovaries. Based on this Dx with PCOS. Currently menses:- Not having any since pregnant  Symptoms of hyperandrogenism- Never tried on aldactone, mild symptoms for now  Fertility- Currently pregnant   Last HbA1C:- 10/23 5.6%, Last Lipid:- 10.23 normal.    Social Hx:- Quit smoking Mook, rare alcohol use, no other drug use, works as a behaviour health therapist  Family Hx:- mother and sister have hypothyroidism     Patient Active Problem List   Diagnosis    Hypothyroidism    Mild episode of recurrent major depressive disorder ([address])    Nicotine abuse    Obesity (BMI 30.0-34. 9)    PCOS (polycystic ovarian syndrome)    Sore throat      Past Medical History:   Diagnosis Date    Anxiety Hypothyroidism       No past surgical history on file. Family History   Problem Relation Age of Onset    Thyroid disease unspecified Mother     Heart disease Mother     Stroke Mother     Hyperthyroidism Mother     Diabetes Brother     No Known Problems Father     Colon cancer Cousin      Social History     Tobacco Use    Smoking status: Never     Passive exposure: Yes    Smokeless tobacco: Never    Tobacco comments:     2 cig a day since age 15   Substance Use Topics    Alcohol use: Yes     No Known Allergies    Current Outpatient Medications:     citalopram (CeleXA) 10 mg tablet, Take 1 tablet (10 mg total) by mouth daily, Disp: 90 tablet, Rfl: 0    estradiol (ESTRACE) 2 MG tablet, TAKE 1 TAB BY MOUTH 3 TIMES A DAY ORALLY AND INSERT ONE TABLET VAGINALLY TWICE A DAY, Disp: , Rfl:     levothyroxine 75 mcg tablet, Take 1 tablet 6 days a week and 2 tablets on Sunday. , Disp: 60 tablet, Rfl: 0    Prenatal Vit-Fe Fumarate-FA (PRENATAL VITAMINS PO), Take 1 tablet by mouth daily, Disp: , Rfl:     progesterone 50 mg/mL injection, INJECT 1 ML INTRAMUSCULARLY ONCE DAILY AS DIRECTED (WITH A RANGE OF  MG DAILY), Disp: , Rfl:     Review of Systems   Constitutional:  Positive for fatigue. Negative for unexpected weight change. HENT:  Negative for trouble swallowing and voice change. Eyes:  Negative for photophobia and visual disturbance. Respiratory:  Negative for choking and shortness of breath. Gastrointestinal:  Positive for constipation. Negative for diarrhea. Endocrine: Negative for cold intolerance and heat intolerance. Musculoskeletal:  Negative for arthralgias and myalgias. Skin:  Negative for rash. Physical Exam:  There is no height or weight on file to calculate BMI.   LMP 02/28/2023    Wt Readings from Last 3 Encounters:   09/05/23 94.3 kg (207 lb 12.8 oz)   06/15/23 90.1 kg (198 lb 9.6 oz)   05/30/23 89.6 kg (197 lb 9.6 oz)       Physical Exam  Constitutional:       Appearance: Normal appearance. She is obese. Cardiovascular:      Rate and Rhythm: Normal rate and regular rhythm. Pulses: Normal pulses. Pulmonary:      Effort: Pulmonary effort is normal.   Abdominal:      General: Abdomen is flat. Bowel sounds are normal.      Palpations: Abdomen is soft. Skin:     General: Skin is warm and dry. Capillary Refill: Capillary refill takes less than 2 seconds. Neurological:      General: No focal deficit present. Mental Status: She is alert and oriented to person, place, and time. Psychiatric:         Mood and Affect: Mood normal.         Labs:      Latest Reference Range & Units 11/19/21 07:03 01/08/22 10:06 05/19/22 08:25 06/30/22 07:03 11/14/22 12:05 01/10/23 07:08 03/07/23 07:23 10/06/23 06:41   TSH, POC 0.450 - 4.500 uIU/mL 2.09 0.86 3.05 1.32 1.93 2.69 2.13 3.020   Free T4 0.82 - 1.77 ng/dL 1.2 1.2 1.2 1.3 1.1 1.1 1.1 1.23      Latest Reference Range & Units 10/06/23 06:41   Hemoglobin A1C 4.8 - 5.6 % 5.3   eAG, EST AVG Glucose mg/dL 105      Latest Reference Range & Units 10/06/23 06:41   Cholesterol 100 - 199 mg/dL 183   Triglycerides 0 - 149 mg/dL 133   HDL >39 mg/dL 65   LDL Calculated 0 - 99 mg/dL 95   VLDL Cholesterol Kenn 5 - 40 mg/dL 23          Latest Reference Range & Units Most Recent   Testosterone, Total, LC/MS 2 - 45 ng/dL 50 (H)  10/25/21 07:15   TESTOSTERONE FREE 0.1 - 6.4 pg/mL 4.4  10/25/21 07:15   (H): Data is abnormally high     Latest Reference Range & Units Most Recent   17-OH PROGESTERONE see note ng/dL 39  10/25/21 07:15   DHEA-SO4 23 - 266 mcg/dL 125  10/25/21 07:15   LUTEINIZING HORMONE mIU/mL 5.1  10/25/21 07:15   FSH, POC mIU/mL 6.4  10/25/21 07:15   PROLACTIN ng/mL 9.3  10/25/21 07:15       US pelvis   PELVIC ULTRASOUND, COMPLETE     INDICATION:  abnormal ovaries on exam.  As per review of electronic medical record,  patient with history of PCOS status post IVF egg retrieval on 2/23/2023 presenting with left lower quadrant pain.      COMPARISON: Pelvic ultrasound from 3/21/2022 and CT of the abdomen and pelvis from earlier today. TECHNIQUE:   Transabdominal pelvic ultrasound was performed in sagittal and transverse planes with a curvilinear transducer. Additional transvaginal imaging was performed to better evaluate the endometrium and ovaries. Imaging included volumetric   sweeps as well as traditional still imaging technique. FINDINGS:      UTERUS:  The uterus is anteverted in position and normal in size, measuring 7.6 x 3.4 x 4.6 cm. Contour and echotexture appear normal.  The cervix shows no suspicious abnormality. ENDOMETRIUM:    Normal caliber of 7 mm. Homogeneous and normal in appearance. OVARIES/ADNEXA:  The right ovary measures 7.5 x 4.5 x 4.6 cm, with a volume of 81.1 mL. The left ovary measures 4.3 x 3.8 x 4.9 cm, with a volume of 42.5 mL. The ovarian volume is increased compared to the prior study from March 2022 where the right ovary had a volume of 10.8 mL in the left ovary had a volume of 9.3 mL. Multiple hemorrhagic follicles are seen in both ovaries. Doppler flow within normal limits. Small amount of free pelvic fluid is seen in the pelvis containing low level internal echoes, likely representing blood products. No organized fluid collections. IMPRESSION:     Both ovaries are enlarged, right greater than left, and contain multiple hemorrhagic follicles. Although this appearance may be secondary to the underlying polycystic ovarian syndrome, the ovaries are significantly enlarged compared to March 2022. Ovarian hyperstimulation syndrome (OHSS) is possible, although the ovaries are not as large as typical of OHSS. No evidence of ovarian torsion. Small amount of free pelvic fluid in the pelvis containing low level internal echoes, likely representing blood products, however infected ascites is not excluded. No organized collections. Unremarkable sonographic appearance of the uterus.      The study was marked in [facility] for immediate notification. Impression & Plan:    Problem List Items Addressed This Visit          Endocrine    Hypothyroidism - Primary    PCOS (polycystic ovarian syndrome)     No orders of the defined types were placed in this encounter. There are no Patient Instructions on file for this visit. Patient is a 33y F with subclinical hypothyroidism and PCOS who presents today for follow up    1) Hypothyroidism:- Patients most recent TSH is at goal at 3.02. Goal 2/3rd trimester <3.0, and 1st trimester <2.5. does not have any overt symptoms of hypothyroidism and hence would simply recommend continuing with levothyroxine 75mcg 6 days and 2 tabs on Sunday and repeat TFT in 4 weeks. Given TSH at cusp of borderline and also since has IVF, will be checking TFT often. 2) PCOS:-   Does not need tx currently since pregnant. Uptodate with metabolic workup, repeat UBE5O and Lipid in 1 year. Discussed should tell Ob about PCOS aand may need early OGTT    RTC in 3 months      Discussed with the patient and all questioned fully answered. She will call me if any problems arise.       Counseled patient on diagnostic results, prognosis, risk and benefit of treatment options, instruction for management, importance of treatment compliance, Risk  factor reduction and impressions      Shweta Fierro MD

## 2023-10-11 ENCOUNTER — TELEPHONE (OUTPATIENT)
Dept: PERINATAL CARE | Facility: OTHER | Age: 33
End: 2023-10-11

## 2023-10-11 NOTE — TELEPHONE ENCOUNTER
Called patient to schedule MFM appointment, based on referral issued to Maternal Fetal Medicine by [place] office. Left voicemail requesting patient to call back and schedule appointment, with office number for return call [phone].

## 2023-10-13 ENCOUNTER — ROUTINE PRENATAL (OUTPATIENT)
Dept: PERINATAL CARE | Facility: OTHER | Age: 33
End: 2023-10-13
Payer: COMMERCIAL

## 2023-10-13 VITALS
WEIGHT: 208.4 LBS | DIASTOLIC BLOOD PRESSURE: 58 MMHG | HEIGHT: 66 IN | HEART RATE: 59 BPM | BODY MASS INDEX: 33.49 KG/M2 | SYSTOLIC BLOOD PRESSURE: 106 MMHG

## 2023-10-13 DIAGNOSIS — O99.211 MATERNAL OBESITY, ANTEPARTUM, FIRST TRIMESTER: ICD-10-CM

## 2023-10-13 DIAGNOSIS — O09.811 PREGNANCY RESULTING FROM IN VITRO FERTILIZATION, FIRST TRIMESTER: Primary | ICD-10-CM

## 2023-10-13 DIAGNOSIS — O99.281 HYPOTHYROIDISM IN PREGNANCY, ANTEPARTUM, FIRST TRIMESTER: ICD-10-CM

## 2023-10-13 DIAGNOSIS — Z3A.13 13 WEEKS GESTATION OF PREGNANCY: ICD-10-CM

## 2023-10-13 DIAGNOSIS — Z36.82 ENCOUNTER FOR ANTENATAL SCREENING FOR NUCHAL TRANSLUCENCY: ICD-10-CM

## 2023-10-13 DIAGNOSIS — E03.9 HYPOTHYROIDISM IN PREGNANCY, ANTEPARTUM, FIRST TRIMESTER: ICD-10-CM

## 2023-10-13 PROCEDURE — 76813 OB US NUCHAL MEAS 1 GEST: CPT | Performed by: OBSTETRICS & GYNECOLOGY

## 2023-10-13 PROCEDURE — 76801 OB US < 14 WKS SINGLE FETUS: CPT | Performed by: OBSTETRICS & GYNECOLOGY

## 2023-10-13 PROCEDURE — 99204 OFFICE O/P NEW MOD 45 MIN: CPT | Performed by: OBSTETRICS & GYNECOLOGY

## 2023-10-13 RX ORDER — ASPIRIN 81 MG/1
81 TABLET, CHEWABLE ORAL
Qty: 90 TABLET | Refills: 1 | Status: SHIPPED | OUTPATIENT
Start: 2023-10-13 | End: 2024-01-11

## 2023-10-13 NOTE — LETTER
October 13, 2023     Juancarlos Sy, [address] Jasson Crawley [address]  [facility] [facility]    Patient: Agnieszka Cobb   YOB: 1990   Date of Visit: 10/13/2023       Dear Dr. Ammy Grant: Thank you for referring Nikia Young to me for evaluation. Below are my notes for this consultation. If you have questions, please do not hesitate to call me. I look forward to following your patient along with you.          Sincerely,        Montrell Marinelli MD        CC: No Recipients    Montrell Marinelli MD  10/13/2023  1:00 PM  Sign when Signing Visit  Please refer to the [facility] ultrasound report in Ob Procedures for additional information regarding today's visit

## 2023-10-13 NOTE — PROGRESS NOTES
Please refer to the [facility] ultrasound report in Ob Procedures for additional information regarding today's visit

## 2023-10-14 LAB
C TRACH RRNA SPEC QL NAA+PROBE: NEGATIVE
N GONORRHOEA RRNA SPEC QL NAA+PROBE: NEGATIVE

## 2023-10-17 ENCOUNTER — PATIENT OUTREACH (OUTPATIENT)
Dept: OBGYN CLINIC | Facility: CLINIC | Age: 33
End: 2023-10-17

## 2023-10-17 NOTE — PROGRESS NOTES
SW called patient to discuss resources found - general search for [place] infertility therapists on [employer], [address] for [employer]. [employer] also offers online support groups for existing patients who used a donor for IVF. Patient agreeable to SW sending via e-mail. SW to check in with patient in two weeks to discuss if any of these options are a good fit for her and her .

## 2023-10-31 ENCOUNTER — PATIENT OUTREACH (OUTPATIENT)
Dept: OBGYN CLINIC | Facility: CLINIC | Age: 33
End: 2023-10-31

## 2023-10-31 NOTE — PROGRESS NOTES
SW called patient to follow up on therapy resources provided two weeks ago - no answer. ZE[facility] left  requesting a return call. Patient's next appointment is scheduled for 8:40AM on 11/15/23 at [facility].

## 2023-11-07 ENCOUNTER — PATIENT OUTREACH (OUTPATIENT)
Dept: OBGYN CLINIC | Facility: CLINIC | Age: 33
End: 2023-11-07

## 2023-11-07 NOTE — PROGRESS NOTES
Second attempt to reach patient to follow up on therapy resources provided via e-mail. No answer - SW left VM requesting a return call. Patient's next appointment is scheduled for 8:40AM on 11/15/23 at [facility]. Addendum-  Patient called back and confirmed she received therapy resources sent by [employer]. Patient and her  went through them and her  plans to also speak with his doctor about support resources as well before they decide which to pursue. Patient denies needing further resources or support from SW at this time - SW closing referral. Please re-refer as needed.

## 2023-11-08 LAB — GLUCOSE 1H P 50 G GLC PO SERPL-MCNC: 128 MG/DL (ref 70–139)

## 2023-11-15 ENCOUNTER — ROUTINE PRENATAL (OUTPATIENT)
Dept: OBGYN CLINIC | Facility: CLINIC | Age: 33
End: 2023-11-15
Payer: COMMERCIAL

## 2023-11-15 VITALS
HEIGHT: 66 IN | SYSTOLIC BLOOD PRESSURE: 102 MMHG | DIASTOLIC BLOOD PRESSURE: 68 MMHG | WEIGHT: 208.8 LBS | BODY MASS INDEX: 33.56 KG/M2

## 2023-11-15 DIAGNOSIS — O99.280 HYPOTHYROID IN PREGNANCY, ANTEPARTUM: ICD-10-CM

## 2023-11-15 DIAGNOSIS — Z3A.17 17 WEEKS GESTATION OF PREGNANCY: ICD-10-CM

## 2023-11-15 DIAGNOSIS — R06.02 SHORTNESS OF BREATH DUE TO PREGNANCY: Primary | ICD-10-CM

## 2023-11-15 DIAGNOSIS — E03.9 HYPOTHYROID IN PREGNANCY, ANTEPARTUM: ICD-10-CM

## 2023-11-15 DIAGNOSIS — O26.899 SHORTNESS OF BREATH DUE TO PREGNANCY: Primary | ICD-10-CM

## 2023-11-15 DIAGNOSIS — Z36.89 ENCOUNTER FOR OTHER SPECIFIED ANTENATAL SCREENING: Primary | ICD-10-CM

## 2023-11-15 DIAGNOSIS — O99.340 ANXIETY IN PREGNANCY, ANTEPARTUM: ICD-10-CM

## 2023-11-15 DIAGNOSIS — F41.9 ANXIETY IN PREGNANCY, ANTEPARTUM: ICD-10-CM

## 2023-11-15 PROBLEM — Z3A.16 16 WEEKS GESTATION OF PREGNANCY: Status: ACTIVE | Noted: 2023-11-15

## 2023-11-15 LAB
SL AMB  POCT GLUCOSE, UA: NORMAL
SL AMB POCT URINE PROTEIN: NORMAL

## 2023-11-15 PROCEDURE — 81002 URINALYSIS NONAUTO W/O SCOPE: CPT | Performed by: PHYSICIAN ASSISTANT

## 2023-11-15 PROCEDURE — PNV: Performed by: PHYSICIAN ASSISTANT

## 2023-11-16 NOTE — PROGRESS NOTES
Routine Prenatal Visit  [address]  [address] [place] [address], [place], [address]    Assessment/Plan:  Cassie Juan is a 35y.o. year old  at Unknown who presents for routine prenatal visit. 1. Shortness of breath due to pregnancy  -     CBC and differential; Future  -     Iron; Future  -     Ferritin; Future  -     CBC and differential  -     Iron  -     Ferritin    2. 17 weeks gestation of pregnancy  Assessment & Plan:  Blood type and screen missed on initial prenatal labs. Reviewed AFP to evaluate for ONTD. Patient declines. Reviewed shortness of breath. Reviewed common in pregnancy, with frequency will plan CBC and iron studies. Script given. Not currently short of breath. Reviewed s/s to go to ER with. Has Level II ultrasound scheduled. Return to office for ob check in 4 weeks. Orders:  -     POCT urine dip  -     CBC and differential; Future  -     Iron; Future  -     Ferritin; Future  -     CBC and differential  -     Iron  -     Ferritin    3. Hypothyroid in pregnancy, antepartum    4. Anxiety in pregnancy, antepartum        Next OB Visit 4 weeks. Subjective:     CC: Prenatal care    Emily Collado is a 35 y.o. Barry Jacob female who presents for routine prenatal care at Unknown. Pregnancy ROS: reports shortness of breath with climbing stairs and exertion. Improves with rest. Hx of exercise induced asthma, does not feel like asthma exacerbation. No FM, N/V, HA, cramping, VB, LOF, edema, domestic violence, or smoking. Tolerating PNV.       The following portions of the patient's history were reviewed and updated as appropriate: allergies, current medications, past family history, past medical history, obstetric history, gynecologic history, past social history, past surgical history and problem list.      Objective:  /68 (BP Location: Left arm, Patient Position: Sitting, Cuff Size: Standard)   Ht 5' 6" (1.676 m)   Wt 94.7 kg (208 lb 12.8 oz)   LMP  (LMP Unknown) BMI 33.70 kg/m²   Pregravid Weight/BMI: 94.3 kg (208 lb) (BMI 33.59)  Current Weight: 94.7 kg (208 lb 12.8 oz)   Total Weight Gain: 0.363 kg (12.8 oz)   Pre- Vitals      Flowsheet Row Most Recent Value   Prenatal Assessment    Fetal Heart Rate 150   Fundal Height (cm) 17 cm   Movement Absent   Prenatal Vitals    Blood Pressure 102/68   Weight - Scale 94.7 kg (208 lb 12.8 oz)   Urine Albumin/Glucose    Dilation/Effacement/Station    Vaginal Drainage    Edema    LLE Edema None   RLE Edema None   Facial Edema None             General: Well appearing, no distress  Abdomen: Soft, gravid, nontender  Fundal Height: Appropriate for gestational age. Extremities: Warm and well perfused. Non tender.

## 2023-11-16 NOTE — ASSESSMENT & PLAN NOTE
Blood type and screen missed on initial prenatal labs. Reviewed AFP to evaluate for ONTD. Patient declines. Reviewed shortness of breath. Reviewed common in pregnancy, with frequency will plan CBC and iron studies. Script given. Not currently short of breath. Reviewed s/s to go to ER with. Has Level II ultrasound scheduled. Return to office for ob check in 4 weeks.

## 2023-11-20 DIAGNOSIS — E03.9 ACQUIRED HYPOTHYROIDISM: ICD-10-CM

## 2023-11-20 DIAGNOSIS — F33.0 MILD EPISODE OF RECURRENT MAJOR DEPRESSIVE DISORDER (HCC): ICD-10-CM

## 2023-11-21 RX ORDER — CITALOPRAM HYDROBROMIDE 10 MG/1
10 TABLET ORAL DAILY
Qty: 90 TABLET | Refills: 0 | Status: SHIPPED | OUTPATIENT
Start: 2023-11-21

## 2023-11-21 RX ORDER — LEVOTHYROXINE SODIUM 0.07 MG/1
TABLET ORAL
Qty: 60 TABLET | Refills: 0 | Status: SHIPPED | OUTPATIENT
Start: 2023-11-21

## 2023-11-27 LAB
EXTERNAL ANTIBODY SCREEN: NORMAL
EXTERNAL HEMATOCRIT: 35.2 %
EXTERNAL HEMOGLOBIN: 11.8 G/DL
EXTERNAL PLATELET COUNT: 276 K/ÂΜL
EXTERNAL RH FACTOR: POSITIVE

## 2023-11-28 LAB
ABO GROUP BLD: NORMAL
BASOPHILS # BLD AUTO: 0.1 X10E3/UL (ref 0–0.2)
BASOPHILS NFR BLD AUTO: 1 %
BLD GP AB SCN SERPL QL: NEGATIVE
EOSINOPHIL # BLD AUTO: 0.3 X10E3/UL (ref 0–0.4)
EOSINOPHIL NFR BLD AUTO: 2 %
ERYTHROCYTE [DISTWIDTH] IN BLOOD BY AUTOMATED COUNT: 12.4 % (ref 11.7–15.4)
FERRITIN SERPL-MCNC: 17 NG/ML (ref 15–150)
HCT VFR BLD AUTO: 35.2 % (ref 34–46.6)
HGB BLD-MCNC: 11.8 G/DL (ref 11.1–15.9)
IMM GRANULOCYTES # BLD: 0 X10E3/UL (ref 0–0.1)
IMM GRANULOCYTES NFR BLD: 0 %
IRON SERPL-MCNC: 125 UG/DL (ref 27–159)
LYMPHOCYTES # BLD AUTO: 2.1 X10E3/UL (ref 0.7–3.1)
LYMPHOCYTES NFR BLD AUTO: 18 %
MCH RBC QN AUTO: 30.4 PG (ref 26.6–33)
MCHC RBC AUTO-ENTMCNC: 33.5 G/DL (ref 31.5–35.7)
MCV RBC AUTO: 91 FL (ref 79–97)
MONOCYTES # BLD AUTO: 0.6 X10E3/UL (ref 0.1–0.9)
MONOCYTES NFR BLD AUTO: 5 %
NEUTROPHILS # BLD AUTO: 8.8 X10E3/UL (ref 1.4–7)
NEUTROPHILS NFR BLD AUTO: 74 %
PLATELET # BLD AUTO: 276 X10E3/UL (ref 150–450)
RBC # BLD AUTO: 3.88 X10E6/UL (ref 3.77–5.28)
RH BLD: POSITIVE
T4 FREE SERPL-MCNC: 1.16 NG/DL (ref 0.82–1.77)
TSH SERPL DL<=0.005 MIU/L-ACNC: 1.29 UIU/ML (ref 0.45–4.5)
WBC # BLD AUTO: 11.8 X10E3/UL (ref 3.4–10.8)

## 2023-11-30 NOTE — PROGRESS NOTES
Routine Prenatal Visit  [address] [place] [address], [address], [facility], [address] [facility],[address]    Assessment/Plan:  Donna San is a 35y.o. year old  at 12w4d who presents for routine prenatal visit. 1. Hypothyroid in pregnancy, antepartum    2. Anxiety in pregnancy, antepartum    3. 12 weeks gestation of pregnancy    4. Unsure of LMP (last menstrual period) as reason for ultrasound scan          Subjective:     CC: Prenatal care    Arsalan Fong is a 35 y.o. [place] Mercury female who presents for routine prenatal care at 12w4d. Pregnancy ROS: no leakage of fluid, pelvic pain, or vaginal bleeding. no fetal movement. The following portions of the patient's history were reviewed and updated as appropriate: allergies, current medications, past family history, past medical history, obstetric history, gynecologic history, past social history, past surgical history and problem list.      Objective:  LMP  (LMP Unknown)   Pregravid Weight/BMI: 94.3 kg (208 lb) (BMI 33.59)  Current Weight:     Total Weight Gain: 0 kg (0 lb)        General: Well appearing, no distress  Respiratory: Unlabored breathing  Cardiovascular: Regular rate. Abdomen: Soft, gravid, nontender  Fundal Height: Appropriate for gestational age. Extremities: Warm and well perfused. Non tender.

## 2023-12-08 ENCOUNTER — ROUTINE PRENATAL (OUTPATIENT)
Dept: PERINATAL CARE | Facility: OTHER | Age: 33
End: 2023-12-08
Payer: COMMERCIAL

## 2023-12-08 VITALS
SYSTOLIC BLOOD PRESSURE: 104 MMHG | DIASTOLIC BLOOD PRESSURE: 66 MMHG | HEIGHT: 66 IN | WEIGHT: 217.2 LBS | HEART RATE: 60 BPM | BODY MASS INDEX: 34.91 KG/M2

## 2023-12-08 DIAGNOSIS — F41.9 ANXIETY DURING PREGNANCY, ANTEPARTUM, FIRST TRIMESTER: ICD-10-CM

## 2023-12-08 DIAGNOSIS — O99.282 HYPOTHYROIDISM DURING PREGNANCY IN SECOND TRIMESTER: ICD-10-CM

## 2023-12-08 DIAGNOSIS — O99.341 ANXIETY DURING PREGNANCY, ANTEPARTUM, FIRST TRIMESTER: ICD-10-CM

## 2023-12-08 DIAGNOSIS — Z36.86 ENCOUNTER FOR ANTENATAL SCREENING FOR CERVICAL LENGTH: ICD-10-CM

## 2023-12-08 DIAGNOSIS — O99.212 MATERNAL OBESITY, ANTEPARTUM, SECOND TRIMESTER: ICD-10-CM

## 2023-12-08 DIAGNOSIS — O09.812 PREGNANCY RESULTING FROM IN VITRO FERTILIZATION, SECOND TRIMESTER: Primary | ICD-10-CM

## 2023-12-08 DIAGNOSIS — F32.A DEPRESSION COMPLICATING PREGNANCY, ANTEPARTUM, FIRST TRIMESTER: ICD-10-CM

## 2023-12-08 DIAGNOSIS — E03.9 HYPOTHYROIDISM DURING PREGNANCY IN SECOND TRIMESTER: ICD-10-CM

## 2023-12-08 DIAGNOSIS — Z3A.21 21 WEEKS GESTATION OF PREGNANCY: ICD-10-CM

## 2023-12-08 DIAGNOSIS — O99.341 DEPRESSION COMPLICATING PREGNANCY, ANTEPARTUM, FIRST TRIMESTER: ICD-10-CM

## 2023-12-08 PROCEDURE — 76811 OB US DETAILED SNGL FETUS: CPT | Performed by: OBSTETRICS & GYNECOLOGY

## 2023-12-08 PROCEDURE — 76817 TRANSVAGINAL US OBSTETRIC: CPT | Performed by: OBSTETRICS & GYNECOLOGY

## 2023-12-08 PROCEDURE — 99214 OFFICE O/P EST MOD 30 MIN: CPT | Performed by: OBSTETRICS & GYNECOLOGY

## 2023-12-08 NOTE — PROGRESS NOTES
Ultrasound Probe Disinfection    A transvaginal ultrasound was performed. Prior to use, disinfection was performed with High Level Disinfection Process (3nderon). Probe serial number [id number] was used.       Kellee Paz  12/08/23  7:56 AM

## 2023-12-08 NOTE — LETTER
December 8, 2023     Nataliia Izaguirre MD  [address]  [address] [id number]    Patient: Nataliia Thompson   YOB: 1990   Date of Visit: 12/8/2023       Dear Dr. Leila Posadas: Thank you for referring Riley Garcia to me for evaluation. Below are my notes for this consultation. If you have questions, please do not hesitate to call me. I look forward to following your patient along with you.          Sincerely,        Asiya Lewis MD        CC: No Recipients    Asiya Lewis MD  12/8/2023  8:18 AM  Sign when Signing Visit  Please refer to the [facility] ultrasound report in Ob Procedures for additional information regarding today's visit

## 2023-12-13 ENCOUNTER — ROUTINE PRENATAL (OUTPATIENT)
Dept: OBGYN CLINIC | Facility: CLINIC | Age: 33
End: 2023-12-13

## 2023-12-13 VITALS — DIASTOLIC BLOOD PRESSURE: 70 MMHG | SYSTOLIC BLOOD PRESSURE: 110 MMHG | BODY MASS INDEX: 34.86 KG/M2 | WEIGHT: 216 LBS

## 2023-12-13 DIAGNOSIS — E03.9 HYPOTHYROID IN PREGNANCY, ANTEPARTUM: ICD-10-CM

## 2023-12-13 DIAGNOSIS — O09.812 PREGNANCY RESULTING FROM IN VITRO FERTILIZATION IN SECOND TRIMESTER: Primary | ICD-10-CM

## 2023-12-13 DIAGNOSIS — O99.280 HYPOTHYROID IN PREGNANCY, ANTEPARTUM: ICD-10-CM

## 2023-12-13 DIAGNOSIS — Z3A.21 21 WEEKS GESTATION OF PREGNANCY: ICD-10-CM

## 2023-12-13 LAB
SL AMB  POCT GLUCOSE, UA: NEGATIVE
SL AMB POCT URINE PROTEIN: NORMAL

## 2023-12-13 NOTE — PROGRESS NOTES
Routine Prenatal Visit  [address]  [address] [place] [address], [address]    Assessment/Plan:  Quang Yao is a 35y.o. year old  at 24w9d who presents for routine prenatal visit. 1. Pregnancy resulting from in vitro fertilization in second trimester  Assessment & Plan:  Fetal echo scheduled. Continue  mg until 36 weeks. 2. 21 weeks gestation of pregnancy  Assessment & Plan:  Continue routine prenatal care. Return to office for ob check in 4 weeks. 3. Hypothyroid in pregnancy, antepartum  Assessment & Plan:  Continue to follow with endocrine. Next OB Visit 4 weeks. Subjective:     CC: Prenatal care    Kenia Sierra is a 35 y.o.  female who presents for routine prenatal care at 21w5d. Pregnancy ROS: Good FM, No N/V, HA, cramping, VB, LOF, edema, domestic violence, or smoking. Tolerating PNV. The following portions of the patient's history were reviewed and updated as appropriate: allergies, current medications, past family history, past medical history, obstetric history, gynecologic history, past social history, past surgical history and problem list.      Objective:  /70   Wt 98 kg (216 lb)   LMP  (LMP Unknown)   BMI 34.86 kg/m²   Pregravid Weight/BMI: 94.3 kg (208 lb) (BMI 33.59)  Current Weight: 98 kg (216 lb)   Total Weight Gain: 3.629 kg (8 lb)   Pre-Ericka Vitals      Flowsheet Row Most Recent Value   Prenatal Assessment    Fetal Heart Rate 140   Fundal Height (cm) 21 cm   Movement Present   Prenatal Vitals    Blood Pressure 110/70   Weight - Scale 98 kg (216 lb)   Urine Albumin/Glucose    Dilation/Effacement/Station    Vaginal Drainage    Edema    LLE Edema None   RLE Edema None   Facial Edema None             General: Well appearing, no distress  Abdomen: Soft, gravid, nontender  Fundal Height: Appropriate for gestational age. Extremities: Warm and well perfused. Non tender.

## 2023-12-29 ENCOUNTER — ROUTINE PRENATAL (OUTPATIENT)
Dept: PERINATAL CARE | Facility: OTHER | Age: 33
End: 2023-12-29
Payer: COMMERCIAL

## 2023-12-29 VITALS
BODY MASS INDEX: 34.42 KG/M2 | WEIGHT: 214.2 LBS | SYSTOLIC BLOOD PRESSURE: 119 MMHG | HEIGHT: 66 IN | DIASTOLIC BLOOD PRESSURE: 64 MMHG | HEART RATE: 95 BPM

## 2023-12-29 DIAGNOSIS — Z3A.24 24 WEEKS GESTATION OF PREGNANCY: ICD-10-CM

## 2023-12-29 DIAGNOSIS — O09.812 PREGNANCY RESULTING FROM IN VITRO FERTILIZATION IN SECOND TRIMESTER: Primary | ICD-10-CM

## 2023-12-29 DIAGNOSIS — Z36.2 ENCOUNTER FOR FOLLOW-UP ULTRASOUND OF FETAL ANATOMY: ICD-10-CM

## 2023-12-29 PROCEDURE — 76825 ECHO EXAM OF FETAL HEART: CPT | Performed by: OBSTETRICS & GYNECOLOGY

## 2023-12-29 PROCEDURE — 76815 OB US LIMITED FETUS(S): CPT | Performed by: OBSTETRICS & GYNECOLOGY

## 2023-12-29 PROCEDURE — 93325 DOPPLER ECHO COLOR FLOW MAPG: CPT | Performed by: OBSTETRICS & GYNECOLOGY

## 2023-12-29 PROCEDURE — 76827 ECHO EXAM OF FETAL HEART: CPT | Performed by: OBSTETRICS & GYNECOLOGY

## 2023-12-29 PROCEDURE — 99214 OFFICE O/P EST MOD 30 MIN: CPT | Performed by: OBSTETRICS & GYNECOLOGY

## 2023-12-29 NOTE — PROGRESS NOTES
"[facility]: Susana Burns was seen today for fetal screening echocardiogram.  See ultrasound report under \"OB Procedures\" tab.   The time spent on this established patient on the encounter date included 5 minutes previsit service time reviewing records and precharting, 6 minutes face-to-face service time counseling regarding results and coordinating care, and  5 minutes charting, totalling 16 minutes.  Please don't hesitate to contact our office with any concerns or questions.  -Kayy Hebert MD    "
details…

## 2024-01-12 ENCOUNTER — ROUTINE PRENATAL (OUTPATIENT)
Dept: OBGYN CLINIC | Facility: CLINIC | Age: 34
End: 2024-01-12
Payer: COMMERCIAL

## 2024-01-12 VITALS
HEIGHT: 66 IN | WEIGHT: 219 LBS | DIASTOLIC BLOOD PRESSURE: 80 MMHG | BODY MASS INDEX: 35.2 KG/M2 | SYSTOLIC BLOOD PRESSURE: 122 MMHG

## 2024-01-12 DIAGNOSIS — O99.280 HYPOTHYROID IN PREGNANCY, ANTEPARTUM: Primary | ICD-10-CM

## 2024-01-12 DIAGNOSIS — E03.9 HYPOTHYROID IN PREGNANCY, ANTEPARTUM: Primary | ICD-10-CM

## 2024-01-12 DIAGNOSIS — O09.812 PREGNANCY RESULTING FROM IN VITRO FERTILIZATION IN SECOND TRIMESTER: ICD-10-CM

## 2024-01-12 DIAGNOSIS — Z3A.26 26 WEEKS GESTATION OF PREGNANCY: ICD-10-CM

## 2024-01-12 DIAGNOSIS — O99.340 ANXIETY IN PREGNANCY, ANTEPARTUM: ICD-10-CM

## 2024-01-12 DIAGNOSIS — Z36.89 ENCOUNTER FOR OTHER SPECIFIED ANTENATAL SCREENING: ICD-10-CM

## 2024-01-12 DIAGNOSIS — F41.9 ANXIETY IN PREGNANCY, ANTEPARTUM: ICD-10-CM

## 2024-01-12 LAB
SL AMB  POCT GLUCOSE, UA: NORMAL
SL AMB POCT URINE PROTEIN: NORMAL

## 2024-01-12 PROCEDURE — 81002 URINALYSIS NONAUTO W/O SCOPE: CPT | Performed by: STUDENT IN AN ORGANIZED HEALTH CARE EDUCATION/TRAINING PROGRAM

## 2024-01-12 PROCEDURE — PNV: Performed by: STUDENT IN AN ORGANIZED HEALTH CARE EDUCATION/TRAINING PROGRAM

## 2024-01-12 NOTE — PROGRESS NOTES
"Routine Prenatal Visit  [facility] - [address], [address], [place] [address]    Assessment/Plan:  Susana is a 33 y.o. year old  at 26w0d who presents for routine prenatal visit.     1. Hypothyroid in pregnancy, antepartum  Assessment & Plan:  - Repeat TSH due now. Has order from Endocrine and will have drawn.       2. Anxiety in pregnancy, antepartum  Assessment & Plan:  - Continue Celexa 10 mg PO daily.       3. Pregnancy resulting from in vitro fertilization in second trimester  Assessment & Plan:  - Fetal echo normal.  - Plan for 32 week growth US and initiation of weekly  testing beginning at 36 weeks.       4. 26 weeks gestation of pregnancy  Assessment & Plan:  - PTL/PPROM/Bleeding precautions given.   - MFM consult report from level II ultrasound reviewed. Repeat US is scheduled in 3rd trimester, per MFM recommendations.  - 28 week labs ordered, lab requisition provided to patient..  - Problem list updated, results console reviewed and updated with pertinent prenatal labs.  - PMH, PSH, medications reviewed and updated as needed.  - Return to office in 2 wk(s) for routine prenatal care      Orders:  -     POCT urine dip    5. Encounter for other specified  screening  -     Glucose, 1H PG; Future  -     CBC; Future  -     RPR, Rfx Qn RPR/Confirm TP; Future  -     Glucose, 1H PG  -     CBC  -     RPR, Rfx Qn RPR/Confirm TP          Subjective:   Susana Burns is a 33 y.o.  who presents for routine prenatal care at 26w0d.  Complaints today: None  LOF: No; VB: No; Contractions: No; FM: Present    Objective:  /80 (BP Location: Right arm, Patient Position: Sitting, Cuff Size: Standard)   Ht 5' 6\" (1.676 m)   Wt 99.3 kg (219 lb)   LMP  (LMP Unknown)   BMI 35.35 kg/m²     General: Well appearing, no distress  Respiratory: Unlabored breathing  Cardiovascular: Regular rate.  Abdomen: Soft, gravid, nontender  Extremities: Warm and well perfused.  " Non tender.    Pregravid Weight/BMI: 94.3 kg (208 lb) (BMI 33.59)  Current Weight: 99.3 kg (219 lb)   Total Weight Gain: 4.99 kg (11 lb)     Pre- Vitals      Flowsheet Row Most Recent Value   Prenatal Assessment    Prenatal Vitals    Blood Pressure 122/80   Weight - Scale 99.3 kg (219 lb)   Urine Albumin/Glucose    Dilation/Effacement/Station    Vaginal Drainage    Edema              Ibrahima Horne MD  2024 3:02 PM

## 2024-01-12 NOTE — ASSESSMENT & PLAN NOTE
- PTL/PPROM/Bleeding precautions given.   - MFM consult report from level II ultrasound reviewed. Repeat US is scheduled in 3rd trimester, per [facility] recommendations.  - 28 week labs ordered, lab requisition provided to patient..  - Problem list updated, results console reviewed and updated with pertinent prenatal labs.  - PMH, PSH, medications reviewed and updated as needed.  - Return to office in 2 wk(s) for routine prenatal care

## 2024-01-12 NOTE — ASSESSMENT & PLAN NOTE
- Fetal echo normal.  - Plan for 32 week growth US and initiation of weekly  testing beginning at 36 weeks.

## 2024-01-13 DIAGNOSIS — E03.9 ACQUIRED HYPOTHYROIDISM: ICD-10-CM

## 2024-01-15 RX ORDER — LEVOTHYROXINE SODIUM 0.07 MG/1
TABLET ORAL
Qty: 60 TABLET | Refills: 0 | Status: SHIPPED | OUTPATIENT
Start: 2024-01-15

## 2024-01-22 PROBLEM — Z3A.27 27 WEEKS GESTATION OF PREGNANCY: Status: ACTIVE | Noted: 2023-11-15

## 2024-01-23 ENCOUNTER — ROUTINE PRENATAL (OUTPATIENT)
Dept: OBGYN CLINIC | Facility: CLINIC | Age: 34
End: 2024-01-23
Payer: COMMERCIAL

## 2024-01-23 VITALS
HEIGHT: 66 IN | BODY MASS INDEX: 35.2 KG/M2 | DIASTOLIC BLOOD PRESSURE: 70 MMHG | WEIGHT: 219 LBS | SYSTOLIC BLOOD PRESSURE: 116 MMHG

## 2024-01-23 DIAGNOSIS — O99.340 ANXIETY IN PREGNANCY, ANTEPARTUM: ICD-10-CM

## 2024-01-23 DIAGNOSIS — F41.9 ANXIETY IN PREGNANCY, ANTEPARTUM: ICD-10-CM

## 2024-01-23 DIAGNOSIS — O99.280 HYPOTHYROID IN PREGNANCY, ANTEPARTUM: ICD-10-CM

## 2024-01-23 DIAGNOSIS — O09.812 PREGNANCY RESULTING FROM IN VITRO FERTILIZATION IN SECOND TRIMESTER: Primary | ICD-10-CM

## 2024-01-23 DIAGNOSIS — Z3A.27 27 WEEKS GESTATION OF PREGNANCY: ICD-10-CM

## 2024-01-23 DIAGNOSIS — E03.9 HYPOTHYROID IN PREGNANCY, ANTEPARTUM: ICD-10-CM

## 2024-01-23 LAB
SL AMB  POCT GLUCOSE, UA: NORMAL
SL AMB POCT URINE PROTEIN: NORMAL

## 2024-01-23 PROCEDURE — 81002 URINALYSIS NONAUTO W/O SCOPE: CPT | Performed by: OBSTETRICS & GYNECOLOGY

## 2024-01-23 PROCEDURE — PNV: Performed by: OBSTETRICS & GYNECOLOGY

## 2024-01-23 NOTE — PROGRESS NOTES
"Routine Prenatal Visit  [facility] - [address], [address], [place] [address]    Assessment/Plan:  Susana is a 33 y.o. year old  at 27w4d who presents for routine prenatal visit.     1. Pregnancy resulting from in vitro fertilization in second trimester  Assessment & Plan:  Fetal echo normal.  F/u growth in third trimester      2. Hypothyroid in pregnancy, antepartum  Assessment & Plan:  Last TSH normal.  Continue medication.  Repeat TSH with 28 week labs next week.      3. Anxiety in pregnancy, antepartum  Assessment & Plan:  Stable on Citalopram      4. 27 weeks gestation of pregnancy  Assessment & Plan:  Has orders for 28 week labs.  Discussed recommendation for Adacel next visit.    Orders:  -     POCT urine dip        Next OB Visit 2 weeks.    Subjective:     CC: Prenatal care    Susana Burns is a 33 y.o.  female who presents for routine prenatal care at 27w4d.  Pregnancy ROS: no leakage of fluid, pelvic pain, or vaginal bleeding.  normal fetal movement.    The following portions of the patient's history were reviewed and updated as appropriate: allergies, current medications, past family history, past medical history, obstetric history, gynecologic history, past social history, past surgical history and problem list.      Objective:  /70 (BP Location: Right arm, Patient Position: Sitting, Cuff Size: Large)   Ht 5' 6\" (1.676 m)   Wt 99.3 kg (219 lb)   LMP  (LMP Unknown)   BMI 35.35 kg/m²   Pregravid Weight/BMI: 94.3 kg (208 lb) (BMI 33.59)  Current Weight: 99.3 kg (219 lb)   Total Weight Gain: 4.99 kg (11 lb)   Pre-Ericka Vitals      Flowsheet Row Most Recent Value   Prenatal Assessment    Fetal Heart Rate 135   Fundal Height (cm) 27 cm   Movement Present   Prenatal Vitals    Blood Pressure 116/70   Weight - Scale 99.3 kg (219 lb)   Urine Albumin/Glucose    Dilation/Effacement/Station    Vaginal Drainage    Edema    LLE Edema None   RLE Edema None         "     General: Well appearing, no distress  Abdomen: Soft, gravid, nontender  Extremities: Non tender.

## 2024-01-29 LAB
EXTERNAL HEMOGLOBIN: 10.7 G/DL
EXTERNAL PLATELET COUNT: 243 K/ÂΜL
EXTERNAL SYPHILIS TOTAL IGG/IGM SCREENING: NORMAL

## 2024-01-30 PROBLEM — O99.019 ANEMIA AFFECTING PREGNANCY, ANTEPARTUM: Status: ACTIVE | Noted: 2024-01-30

## 2024-01-30 LAB
ERYTHROCYTE [DISTWIDTH] IN BLOOD BY AUTOMATED COUNT: 12.7 % (ref 11.7–15.4)
GLUCOSE 1H P 50 G GLC PO SERPL-MCNC: 112 MG/DL (ref 70–139)
HCT VFR BLD AUTO: 31.2 % (ref 34–46.6)
HGB BLD-MCNC: 10.7 G/DL (ref 11.1–15.9)
MCH RBC QN AUTO: 30.7 PG (ref 26.6–33)
MCHC RBC AUTO-ENTMCNC: 34.3 G/DL (ref 31.5–35.7)
MCV RBC AUTO: 89 FL (ref 79–97)
PLATELET # BLD AUTO: 243 X10E3/UL (ref 150–450)
RBC # BLD AUTO: 3.49 X10E6/UL (ref 3.77–5.28)
RPR SER QL: NON REACTIVE
WBC # BLD AUTO: 9.5 X10E3/UL (ref 3.4–10.8)

## 2024-02-08 ENCOUNTER — ROUTINE PRENATAL (OUTPATIENT)
Dept: OBGYN CLINIC | Facility: CLINIC | Age: 34
End: 2024-02-08
Payer: COMMERCIAL

## 2024-02-08 VITALS
DIASTOLIC BLOOD PRESSURE: 76 MMHG | BODY MASS INDEX: 36.16 KG/M2 | SYSTOLIC BLOOD PRESSURE: 112 MMHG | HEIGHT: 66 IN | WEIGHT: 225 LBS

## 2024-02-08 DIAGNOSIS — E03.9 HYPOTHYROID IN PREGNANCY, ANTEPARTUM: ICD-10-CM

## 2024-02-08 DIAGNOSIS — Z3A.29 29 WEEKS GESTATION OF PREGNANCY: ICD-10-CM

## 2024-02-08 DIAGNOSIS — O99.280 HYPOTHYROID IN PREGNANCY, ANTEPARTUM: ICD-10-CM

## 2024-02-08 DIAGNOSIS — O99.019 ANEMIA AFFECTING PREGNANCY, ANTEPARTUM: ICD-10-CM

## 2024-02-08 DIAGNOSIS — O09.813 PREGNANCY RESULTING FROM IN VITRO FERTILIZATION IN THIRD TRIMESTER: Primary | ICD-10-CM

## 2024-02-08 LAB
SL AMB  POCT GLUCOSE, UA: NORMAL
SL AMB  POCT GLUCOSE, UA: NORMAL
SL AMB POCT URINE PROTEIN: NORMAL
SL AMB POCT URINE PROTEIN: NORMAL

## 2024-02-08 PROCEDURE — 81002 URINALYSIS NONAUTO W/O SCOPE: CPT | Performed by: OBSTETRICS & GYNECOLOGY

## 2024-02-08 PROCEDURE — PNV: Performed by: OBSTETRICS & GYNECOLOGY

## 2024-02-08 NOTE — PROGRESS NOTES
"Routine Prenatal Visit  [facility] [place]  [address] [place] [address]    Assessment/Plan:  Susana is a 33 y.o. year old  at 29w6d who presents for routine prenatal visit.     1. Pregnancy resulting from in vitro fertilization in third trimester    2. Hypothyroid in pregnancy, antepartum  Assessment & Plan:  Reviewed desired range thyroid testing results      3. Anemia affecting pregnancy, antepartum    4. 29 weeks gestation of pregnancy  Assessment & Plan:  Reviewed 28 week labs. Daily iron recommended. Considering TDAP for next visit    Orders:  -     POCT urine dip          Subjective:     CC: Prenatal care    Susana Burns is a 33 y.o.  female who presents for routine prenatal care at 29w6d.  Pregnancy ROS: no leakage of fluid, pelvic pain, or vaginal bleeding.  normal fetal movement.    The following portions of the patient's history were reviewed and updated as appropriate: allergies, current medications, past family history, past medical history, obstetric history, gynecologic history, past social history, past surgical history and problem list.      Objective:  /76 (BP Location: Right arm, Patient Position: Sitting, Cuff Size: Standard)   Ht 5' 6\" (1.676 m)   Wt 102 kg (225 lb)   LMP  (LMP Unknown)   BMI 36.32 kg/m²   Pregravid Weight/BMI: 94.3 kg (208 lb) (BMI 33.59)  Current Weight: 102 kg (225 lb)   Total Weight Gain: 7.711 kg (17 lb)   Pre-Ericka Vitals      Flowsheet Row Most Recent Value   Prenatal Assessment    Fetal Heart Rate 145   Fundal Height (cm) 30 cm   Movement Present   Presentation Vertex   Prenatal Vitals    Blood Pressure 112/76   Weight - Scale 102 kg (225 lb)   Urine Albumin/Glucose    Dilation/Effacement/Station    Vaginal Drainage    Edema              General: Well appearing, no distress  Respiratory: Unlabored breathing  Cardiovascular: Regular rate.  Abdomen: Soft, gravid, nontender  Fundal Height: Appropriate for gestational " age.  Extremities: Warm and well perfused.  Non tender.

## 2024-02-09 LAB
DME PARACHUTE DELIVERY DATE REQUESTED: NORMAL
DME PARACHUTE DELIVERY NOTE: NORMAL
DME PARACHUTE ITEM DESCRIPTION: NORMAL
DME PARACHUTE ORDER STATUS: NORMAL
DME PARACHUTE SUPPLIER NAME: NORMAL
DME PARACHUTE SUPPLIER PHONE: NORMAL

## 2024-02-10 PROBLEM — O09.813 PREGNANCY RESULTING FROM IN VITRO FERTILIZATION IN THIRD TRIMESTER: Status: ACTIVE | Noted: 2023-12-13

## 2024-02-20 ENCOUNTER — ROUTINE PRENATAL (OUTPATIENT)
Dept: OBGYN CLINIC | Facility: CLINIC | Age: 34
End: 2024-02-20
Payer: COMMERCIAL

## 2024-02-20 VITALS — DIASTOLIC BLOOD PRESSURE: 62 MMHG | SYSTOLIC BLOOD PRESSURE: 106 MMHG | WEIGHT: 230 LBS | BODY MASS INDEX: 37.12 KG/M2

## 2024-02-20 DIAGNOSIS — E03.9 HYPOTHYROID IN PREGNANCY, ANTEPARTUM: ICD-10-CM

## 2024-02-20 DIAGNOSIS — Z23 NEED FOR TDAP VACCINATION: ICD-10-CM

## 2024-02-20 DIAGNOSIS — Z3A.31 31 WEEKS GESTATION OF PREGNANCY: ICD-10-CM

## 2024-02-20 DIAGNOSIS — F41.9 ANXIETY IN PREGNANCY, ANTEPARTUM: ICD-10-CM

## 2024-02-20 DIAGNOSIS — O99.340 ANXIETY IN PREGNANCY, ANTEPARTUM: ICD-10-CM

## 2024-02-20 DIAGNOSIS — O99.280 HYPOTHYROID IN PREGNANCY, ANTEPARTUM: ICD-10-CM

## 2024-02-20 DIAGNOSIS — O99.019 ANEMIA AFFECTING PREGNANCY, ANTEPARTUM: ICD-10-CM

## 2024-02-20 DIAGNOSIS — O09.813 PREGNANCY RESULTING FROM IN VITRO FERTILIZATION IN THIRD TRIMESTER: Primary | ICD-10-CM

## 2024-02-20 LAB
SL AMB  POCT GLUCOSE, UA: NEGATIVE
SL AMB POCT URINE PROTEIN: NEGATIVE

## 2024-02-20 PROCEDURE — 90715 TDAP VACCINE 7 YRS/> IM: CPT | Performed by: STUDENT IN AN ORGANIZED HEALTH CARE EDUCATION/TRAINING PROGRAM

## 2024-02-20 PROCEDURE — 90471 IMMUNIZATION ADMIN: CPT | Performed by: STUDENT IN AN ORGANIZED HEALTH CARE EDUCATION/TRAINING PROGRAM

## 2024-02-20 PROCEDURE — PNV: Performed by: STUDENT IN AN ORGANIZED HEALTH CARE EDUCATION/TRAINING PROGRAM

## 2024-02-20 PROCEDURE — 81002 URINALYSIS NONAUTO W/O SCOPE: CPT | Performed by: STUDENT IN AN ORGANIZED HEALTH CARE EDUCATION/TRAINING PROGRAM

## 2024-02-20 RX ORDER — FERROUS SULFATE 137(45) MG
TABLET, EXTENDED RELEASE ORAL
COMMUNITY

## 2024-02-20 NOTE — PROGRESS NOTES
Routine Prenatal Visit  [facility] [place]  [address] [place] [address]    Assessment/Plan:  Susana is a 33 y.o. year old  at 31w4d who presents for routine prenatal visit.     1. Pregnancy resulting from in vitro fertilization in third trimester  Assessment & Plan:  - Growth US scheduled at 32 weeks.   - Plan for initiation of weekly APFS beginning at 36 weeks.   - Continue  mg PO daily until 36 weeks for pre-eclampsia risk reduction.       2. Hypothyroid in pregnancy, antepartum  Assessment & Plan:  - TSH on 2024 was at goal (1.02). Continue levothyroxine 75 mcg PO daily.       3. Anxiety in pregnancy, antepartum  Assessment & Plan:  - Mood stable on Celexa.       4. Anemia affecting pregnancy, antepartum  Assessment & Plan:  - Continue oral iron supplementation.   - Recommend repeat CBC with ferritin 4 weeks after starting oral iron. Lab requisition slips provided today.     Orders:  -     CBC; Future  -     Ferritin; Future  -     CBC  -     Ferritin    5. 31 weeks gestation of pregnancy  Assessment & Plan:  - PTL/PPROM/Bleeding precautions given. Kick counts reviewed  - Third trimester labs reviewed.  - Recommendation for administration of TDaP was reviewed. TDaP administered today. Discussed recommendation for partner and close family members to ensure up-to-date on TDaP vaccination prior to delivery.   - Problem list updated, results console reviewed and updated with pertinent prenatal labs.  - PMH, PSH, medications reviewed and updated as needed  - Return to office in 2 wk(s) for routine prenatal care      Orders:  -     POCT urine dip    6. Need for Tdap vaccination  -     TDAP VACCINE GREATER THAN OR EQUAL TO 8YO IM          Subjective:   Susana Burns is a 33 y.o.  who presents for routine prenatal care at 31w4d.  Complaints today: No  LOF: No; VB: No; Contractions: No; FM: Present and normal    Objective:  /62   Wt 104 kg (230 lb)   LMP  (LMP  Unknown)   BMI 37.12 kg/m²     General: Well appearing, no distress  Respiratory: Unlabored breathing  Cardiovascular: Regular rate.  Abdomen: Soft, gravid, nontender  Extremities: Warm and well perfused.  Non tender.    Pregravid Weight/BMI: 94.3 kg (208 lb) (BMI 33.59)  Current Weight: 104 kg (230 lb)   Total Weight Gain: 9.979 kg (22 lb)     Pre-Ericka Vitals      Flowsheet Row Most Recent Value   Prenatal Assessment    Fetal Heart Rate 130   Fundal Height (cm) 32 cm   Movement Present   Prenatal Vitals    Blood Pressure 106/62   Weight - Scale 104 kg (230 lb)   Urine Albumin/Glucose    Dilation/Effacement/Station    Vaginal Drainage    Draining Fluid No   Edema    LLE Edema None   RLE Edema None   Facial Edema None             Ibrahima Horne MD  2024 4:18 PM

## 2024-02-20 NOTE — ASSESSMENT & PLAN NOTE
- PTL/PPROM/Bleeding precautions given. Kick counts reviewed  - Third trimester labs reviewed.  - Recommendation for administration of TDaP was reviewed. TDaP administered today. Discussed recommendation for partner and close family members to ensure up-to-date on TDaP vaccination prior to delivery.   - Problem list updated, results console reviewed and updated with pertinent prenatal labs.  - PMH, PSH, medications reviewed and updated as needed  - Return to office in 2 wk(s) for routine prenatal care

## 2024-02-20 NOTE — ASSESSMENT & PLAN NOTE
- Continue oral iron supplementation.   - Recommend repeat CBC with ferritin 4 weeks after starting oral iron. Lab requisition slips provided today.

## 2024-02-20 NOTE — ASSESSMENT & PLAN NOTE
- Growth US scheduled at 32 weeks.   - Plan for initiation of weekly APFS beginning at 36 weeks.   - Continue  mg PO daily until 36 weeks for pre-eclampsia risk reduction.

## 2024-02-23 ENCOUNTER — ULTRASOUND (OUTPATIENT)
Dept: PERINATAL CARE | Facility: OTHER | Age: 34
End: 2024-02-23
Payer: COMMERCIAL

## 2024-02-23 VITALS
WEIGHT: 227.8 LBS | HEART RATE: 57 BPM | SYSTOLIC BLOOD PRESSURE: 102 MMHG | BODY MASS INDEX: 36.61 KG/M2 | HEIGHT: 66 IN | DIASTOLIC BLOOD PRESSURE: 70 MMHG

## 2024-02-23 DIAGNOSIS — O99.213 OBESITY AFFECTING PREGNANCY IN THIRD TRIMESTER, UNSPECIFIED OBESITY TYPE: ICD-10-CM

## 2024-02-23 DIAGNOSIS — Z36.89 ENCOUNTER FOR ULTRASOUND TO CHECK FETAL GROWTH: ICD-10-CM

## 2024-02-23 DIAGNOSIS — O09.813 PREGNANCY RESULTING FROM IN VITRO FERTILIZATION IN THIRD TRIMESTER: Primary | ICD-10-CM

## 2024-02-23 LAB
DME PARACHUTE DELIVERY DATE ACTUAL: NORMAL
DME PARACHUTE DELIVERY DATE REQUESTED: NORMAL
DME PARACHUTE DELIVERY NOTE: NORMAL
DME PARACHUTE ITEM DESCRIPTION: NORMAL
DME PARACHUTE ORDER STATUS: NORMAL
DME PARACHUTE SUPPLIER NAME: NORMAL
DME PARACHUTE SUPPLIER PHONE: NORMAL

## 2024-02-23 PROCEDURE — 76816 OB US FOLLOW-UP PER FETUS: CPT | Performed by: OBSTETRICS & GYNECOLOGY

## 2024-02-23 PROCEDURE — 99213 OFFICE O/P EST LOW 20 MIN: CPT | Performed by: OBSTETRICS & GYNECOLOGY

## 2024-02-23 NOTE — PROGRESS NOTES
"[facility]: Ms. Burns was seen today for fetal growth assessment ultrasound.  See ultrasound report under \"OB Procedures\" tab.   The time spent on this established patient on the encounter date included 5 minutes previsit service time reviewing records and precharting, 5 minutes face-to-face service time counseling regarding results and coordinating care, and  4 minutes charting, totalling 14 minutes.  Please don't hesitate to contact our office with any concerns or questions.  -Radha Cabrera MD    "

## 2024-02-27 ENCOUNTER — OFFICE VISIT (OUTPATIENT)
Dept: ENDOCRINOLOGY | Facility: HOSPITAL | Age: 34
End: 2024-02-27
Payer: COMMERCIAL

## 2024-02-27 VITALS
DIASTOLIC BLOOD PRESSURE: 76 MMHG | SYSTOLIC BLOOD PRESSURE: 126 MMHG | HEART RATE: 88 BPM | BODY MASS INDEX: 36.32 KG/M2 | WEIGHT: 226 LBS | HEIGHT: 66 IN | OXYGEN SATURATION: 98 %

## 2024-02-27 DIAGNOSIS — E03.8 HYPOTHYROIDISM DUE TO HASHIMOTO'S THYROIDITIS: ICD-10-CM

## 2024-02-27 DIAGNOSIS — E06.3 HYPOTHYROIDISM DUE TO HASHIMOTO'S THYROIDITIS: ICD-10-CM

## 2024-02-27 DIAGNOSIS — E28.2 PCOS (POLYCYSTIC OVARIAN SYNDROME): Primary | ICD-10-CM

## 2024-02-27 PROCEDURE — 99214 OFFICE O/P EST MOD 30 MIN: CPT | Performed by: STUDENT IN AN ORGANIZED HEALTH CARE EDUCATION/TRAINING PROGRAM

## 2024-02-27 NOTE — PROGRESS NOTES
Established Patient Progress Note       Chief Complaint   Patient presents with    Hypothyroidism      History of Present Illness:     Susana Burns is a 33 y.o. female with a history of PCOS, hypothyroidism who presents today for follow up. Last visit 10/23. Patient seen today for hypothyroidism in pregnancy management. She is currently 33 weeks pregnant.     Hypothyroidism:- Patient was dx 4-5 years ago and started on low dose levothyroxine and slowly dose escalated to 75mcg daily. Last dose adjustment increased dose to 75mcg 6 days a week and 2 tab on Sunday when got pregnant. Control during first trimester at goal. Most recent TFT 1.0 with T4 1.02. Clinically feels well, energy level is manageable, no constipation. No issues with fetal growth.     PCOS:- Hx of irregular menses since past couple of years. Indicates menses were more regular when was on vegan diet, with mild hirsutism with facial acne and hair growth primarily on face. Workup in past normal DHEA-s, Prolactin, Thyroid, 17 hydroxyprogrestrone with mildly elevated testosterone at 50 and recent US pelvis finding of multiple hemorrhagic cysts in b/l ovaries. Based on this Dx with PCOS.   Currently menses:- Not having any since pregnant  Symptoms of hyperandrogenism- Never tried on aldactone, mild symptoms for now  Fertility- Currently pregnant   Last HbA1C:- 10/23 5.6%, Last Lipid:- 10.23 normal.  Had OGTT twice and was normal     Social Hx:- Quit smoking Mook, rare alcohol use, no other drug use, works as a behaviour health therapist  Family Hx:- mother and sister have hypothyroidism     Patient Active Problem List   Diagnosis    Hypothyroidism    Mild episode of recurrent major depressive disorder (HCC)    Nicotine abuse    Obesity (BMI 30.0-34.9)    PCOS (polycystic ovarian syndrome)    Sore throat    Hypothyroid in pregnancy, antepartum    Anxiety in pregnancy, antepartum    31 weeks gestation of pregnancy    Pregnancy resulting from in vitro  fertilization in third trimester    Anemia affecting pregnancy, antepartum      Past Medical History:   Diagnosis Date    Anxiety     Depression     Hypothyroidism     Polycystic ovary syndrome       No past surgical history on file.   Family History   Problem Relation Age of Onset    Thyroid disease unspecified Mother     Heart disease Mother     Stroke Mother     Hyperthyroidism Mother     Diabetes Brother     No Known Problems Father     Colon cancer Cousin      Social History     Tobacco Use    Smoking status: Some Days     Current packs/day: 0.00     Types: Cigarettes     Last attempt to quit: 3/17/2022     Years since quittin.9     Passive exposure: Yes    Smokeless tobacco: Never    Tobacco comments:     2 cig a day since age 13   Substance Use Topics    Alcohol use: Yes     No Known Allergies    Current Outpatient Medications:     citalopram (CeleXA) 10 mg tablet, Take 1 tablet (10 mg total) by mouth daily, Disp: 90 tablet, Rfl: 0    Ferrous Sulfate ER (Slow Fe) 142 (45 Fe) MG TBCR, Take by mouth, Disp: , Rfl:     levothyroxine 75 mcg tablet, Take 1 tablet 6 days a week and 2 tablets on ., Disp: 60 tablet, Rfl: 0    Prenatal Vit-Fe Fumarate-FA (PRENATAL VITAMINS PO), Take 1 tablet by mouth daily, Disp: , Rfl:     aspirin 81 mg chewable tablet, Chew 1 tablet (81 mg total) daily at bedtime (Patient taking differently: Chew 162 mg daily at bedtime), Disp: 90 tablet, Rfl: 1    Review of Systems   Constitutional:  Positive for fatigue. Negative for unexpected weight change.   HENT:  Negative for trouble swallowing and voice change.    Eyes:  Negative for photophobia and visual disturbance.   Respiratory:  Negative for choking and shortness of breath.    Gastrointestinal:  Positive for constipation. Negative for diarrhea.   Endocrine: Negative for cold intolerance and heat intolerance.   Musculoskeletal:  Negative for arthralgias and myalgias.   Skin:  Negative for rash.       Physical Exam:  Body mass  "index is 36.48 kg/m².  /76   Pulse 88   Ht 5' 6\" (1.676 m)   Wt 103 kg (226 lb)   LMP  (LMP Unknown)   SpO2 98%   BMI 36.48 kg/m²    Wt Readings from Last 3 Encounters:   02/27/24 103 kg (226 lb)   02/23/24 103 kg (227 lb 12.8 oz)   02/20/24 104 kg (230 lb)       Physical Exam  Constitutional:       Appearance: Normal appearance. She is obese.   Cardiovascular:      Rate and Rhythm: Normal rate and regular rhythm.      Pulses: Normal pulses.   Pulmonary:      Effort: Pulmonary effort is normal.   Abdominal:      General: Abdomen is flat. Bowel sounds are normal.      Palpations: Abdomen is soft.   Skin:     General: Skin is warm and dry.      Capillary Refill: Capillary refill takes less than 2 seconds.   Neurological:      General: No focal deficit present.      Mental Status: She is alert and oriented to person, place, and time.   Psychiatric:         Mood and Affect: Mood normal.         Labs:    Latest Reference Range & Units 10/06/23 06:41 11/07/23 13:28 11/27/23 09:19 01/29/24 13:29   TSH, POC 0.450 - 4.500 uIU/mL 3.020 1.570 1.290 1.020   Free T4 0.82 - 1.77 ng/dL 1.23 1.16 1.16 1.14      Latest Reference Range & Units 10/06/23 06:41   Hemoglobin A1C 4.8 - 5.6 % 5.3   eAG, EST AVG Glucose mg/dL 105      Latest Reference Range & Units 10/06/23 06:41   Cholesterol 100 - 199 mg/dL 183   Triglycerides 0 - 149 mg/dL 133   HDL >39 mg/dL 65   LDL Calculated 0 - 99 mg/dL 95   VLDL Cholesterol Kenn 5 - 40 mg/dL 23          Latest Reference Range & Units Most Recent   Testosterone, Total, LC/MS 2 - 45 ng/dL 50 (H)  10/25/21 07:15   TESTOSTERONE FREE 0.1 - 6.4 pg/mL 4.4  10/25/21 07:15   (H): Data is abnormally high     Latest Reference Range & Units Most Recent   17-OH PROGESTERONE see note ng/dL 39  10/25/21 07:15   DHEA-SO4 23 - 266 mcg/dL 125  10/25/21 07:15   LUTEINIZING HORMONE mIU/mL 5.1  10/25/21 07:15   FSH, POC mIU/mL 6.4  10/25/21 07:15   PROLACTIN ng/mL 9.3  10/25/21 07:15       US pelvis   PELVIC " ULTRASOUND, COMPLETE     INDICATION:  abnormal ovaries on exam.  As per review of electronic medical record,  patient with history of PCOS status post IVF egg retrieval on 2/23/2023 presenting with left lower quadrant pain.     COMPARISON: Pelvic ultrasound from 3/21/2022 and CT of the abdomen and pelvis from earlier today.     TECHNIQUE:   Transabdominal pelvic ultrasound was performed in sagittal and transverse planes with a curvilinear transducer.  Additional transvaginal imaging was performed to better evaluate the endometrium and ovaries.  Imaging included volumetric   sweeps as well as traditional still imaging technique.     FINDINGS:      UTERUS:  The uterus is anteverted in position and normal in size, measuring 7.6 x 3.4 x 4.6 cm.   Contour and echotexture appear normal.  The cervix shows no suspicious abnormality.     ENDOMETRIUM:    Normal caliber of 7 mm.   Homogeneous and normal in appearance.     OVARIES/ADNEXA:  The right ovary measures 7.5 x 4.5 x 4.6 cm, with a volume of 81.1 mL.  The left ovary measures 4.3 x 3.8 x 4.9 cm, with a volume of 42.5 mL.  The ovarian volume is increased compared to the prior study from March 2022 where the right ovary had a volume of 10.8 mL in the left ovary had a volume of 9.3 mL.  Multiple hemorrhagic follicles are seen in both ovaries.  Doppler flow within normal limits.     Small amount of free pelvic fluid is seen in the pelvis containing low level internal echoes, likely representing blood products.  No organized fluid collections.     IMPRESSION:     Both ovaries are enlarged, right greater than left, and contain multiple hemorrhagic follicles.  Although this appearance may be secondary to the underlying polycystic ovarian syndrome, the ovaries are significantly enlarged compared to March 2022.    Ovarian hyperstimulation syndrome (OHSS) is possible, although the ovaries are not as large as typical of OHSS.     No evidence of ovarian torsion.     Small amount of  free pelvic fluid in the pelvis containing low level internal echoes, likely representing blood products, however infected ascites is not excluded.  No organized collections.     Unremarkable sonographic appearance of the uterus.     The study was marked in EPIC for immediate notification.    Impression & Plan:    Problem List Items Addressed This Visit          Endocrine    Hypothyroidism    Relevant Orders    T4, free    TSH, 3rd generation    PCOS (polycystic ovarian syndrome) - Primary    Relevant Orders    T4, free    TSH, 3rd generation       Orders Placed This Encounter   Procedures    T4, free     Standing Status:   Future     Number of Occurrences:   1     Standing Expiration Date:   2/27/2025    TSH, 3rd generation     This is a patient instruction: This test is non-fasting. Please drink two glasses of water morning of bloodwork.        Standing Status:   Future     Number of Occurrences:   1     Standing Expiration Date:   2/27/2025       There are no Patient Instructions on file for this visit.    Patient is a 32y F with subclinical hypothyroidism and PCOS who presents today for follow up    1) Hypothyroidism:- Patients most recent TSH is at goal. Goal 2/3rd trimester <3.0, does not have any overt symptoms of hypothyroidism and hence would simply recommend continuing with levothyroxine 75mcg 6 days and 2 tabs on Sunday and repeat TFT 4-6 weeks after post partum and then assess if needs to reduce dose back to pre-pregnancy dose to 75mcg 6 days a week and 1.5 tab on Sunday.     2) PCOS:-   Does not need tx currently since pregnant.   Uptodate with metabolic workup, repeat HbA1C and Lipid in 1 year.   Discussed will revisit this topic 6 months post partum to assess her symptoms then    RTC in 4 months      Discussed with the patient and all questioned fully answered. She will call me if any problems arise.      Counseled patient on diagnostic results, prognosis, risk and benefit of treatment options,  instruction for management, importance of treatment compliance, Risk  factor reduction and impressions      Nanda Ocampo MD

## 2024-03-04 PROBLEM — Z3A.33 33 WEEKS GESTATION OF PREGNANCY: Status: ACTIVE | Noted: 2023-11-15

## 2024-03-04 PROBLEM — O99.013 ANEMIA AFFECTING PREGNANCY IN THIRD TRIMESTER: Status: ACTIVE | Noted: 2024-01-30

## 2024-03-04 PROBLEM — O99.343 ANXIETY DURING PREGNANCY IN THIRD TRIMESTER, ANTEPARTUM: Status: ACTIVE | Noted: 2023-10-10

## 2024-03-06 ENCOUNTER — ROUTINE PRENATAL (OUTPATIENT)
Dept: OBGYN CLINIC | Facility: CLINIC | Age: 34
End: 2024-03-06
Payer: COMMERCIAL

## 2024-03-06 VITALS
WEIGHT: 225 LBS | DIASTOLIC BLOOD PRESSURE: 64 MMHG | SYSTOLIC BLOOD PRESSURE: 104 MMHG | BODY MASS INDEX: 36.16 KG/M2 | HEIGHT: 66 IN

## 2024-03-06 DIAGNOSIS — E03.9 HYPOTHYROID IN PREGNANCY, ANTEPARTUM: ICD-10-CM

## 2024-03-06 DIAGNOSIS — O99.343 ANXIETY DURING PREGNANCY IN THIRD TRIMESTER, ANTEPARTUM: ICD-10-CM

## 2024-03-06 DIAGNOSIS — O99.280 HYPOTHYROID IN PREGNANCY, ANTEPARTUM: ICD-10-CM

## 2024-03-06 DIAGNOSIS — O09.813 PREGNANCY RESULTING FROM IN VITRO FERTILIZATION IN THIRD TRIMESTER: Primary | ICD-10-CM

## 2024-03-06 DIAGNOSIS — O99.013 ANEMIA AFFECTING PREGNANCY IN THIRD TRIMESTER: ICD-10-CM

## 2024-03-06 DIAGNOSIS — Z3A.33 33 WEEKS GESTATION OF PREGNANCY: ICD-10-CM

## 2024-03-06 DIAGNOSIS — F41.9 ANXIETY DURING PREGNANCY IN THIRD TRIMESTER, ANTEPARTUM: ICD-10-CM

## 2024-03-06 LAB
SL AMB  POCT GLUCOSE, UA: NORMAL
SL AMB POCT URINE PROTEIN: NORMAL

## 2024-03-06 PROCEDURE — PNV: Performed by: OBSTETRICS & GYNECOLOGY

## 2024-03-06 PROCEDURE — 81002 URINALYSIS NONAUTO W/O SCOPE: CPT | Performed by: OBSTETRICS & GYNECOLOGY

## 2024-03-06 NOTE — PROGRESS NOTES
"Routine Prenatal Visit  [facility] - [address], [address], [place] [address]    Assessment/Plan:  Susana is a 33 y.o. year old  at 33w5d who presents for routine prenatal visit.     1. Pregnancy resulting from in vitro fertilization in third trimester  Assessment & Plan:  Growth u/s 3/20/2024 and plan for  testing at 36 weeks.      2. Anemia affecting pregnancy in third trimester  Assessment & Plan:  Continue po iron.      3. Hypothyroid in pregnancy, antepartum  Assessment & Plan:  Continue medication.  Last TSH normal 2024      4. Anxiety during pregnancy in third trimester, antepartum  Assessment & Plan:  Stable on citalopram      5. 33 weeks gestation of pregnancy  -     POCT urine dip        Next OB Visit 2 weeks.    Subjective:     CC: Prenatal care    Susana Burns is a 33 y.o.  female who presents for routine prenatal care at 33w5d.  Pregnancy ROS: no leakage of fluid, pelvic pain, or vaginal bleeding.  normal fetal movement.    The following portions of the patient's history were reviewed and updated as appropriate: allergies, current medications, past family history, past medical history, obstetric history, gynecologic history, past social history, past surgical history and problem list.      Objective:  /64 (BP Location: Right arm, Patient Position: Sitting, Cuff Size: Standard)   Ht 5' 6\" (1.676 m)   Wt 102 kg (225 lb)   LMP  (LMP Unknown)   BMI 36.32 kg/m²   Pregravid Weight/BMI: 94.3 kg (208 lb) (BMI 33.59)  Current Weight: 102 kg (225 lb)   Total Weight Gain: 7.711 kg (17 lb)   Pre- Vitals      Flowsheet Row Most Recent Value   Prenatal Assessment    Fetal Heart Rate 125   Fundal Height (cm) 34 cm   Movement Present   Prenatal Vitals    Blood Pressure 104/64   Weight - Scale 102 kg (225 lb)   Urine Albumin/Glucose    Dilation/Effacement/Station    Vaginal Drainage    Edema    LLE Edema None   RLE Edema None             General: " Well appearing, no distress  Abdomen: Soft, gravid, nontender  Extremities: Non tender.   Xray Hip w/ Pelvis 2 or 3 Views, Left

## 2024-03-12 ENCOUNTER — OFFICE VISIT (OUTPATIENT)
Dept: FAMILY MEDICINE CLINIC | Facility: HOSPITAL | Age: 34
End: 2024-03-12
Payer: COMMERCIAL

## 2024-03-12 VITALS
TEMPERATURE: 97.6 F | DIASTOLIC BLOOD PRESSURE: 79 MMHG | HEART RATE: 71 BPM | OXYGEN SATURATION: 98 % | SYSTOLIC BLOOD PRESSURE: 109 MMHG | HEIGHT: 66 IN | BODY MASS INDEX: 36.61 KG/M2 | WEIGHT: 227.8 LBS

## 2024-03-12 DIAGNOSIS — Z00.00 ANNUAL PHYSICAL EXAM: Primary | ICD-10-CM

## 2024-03-12 DIAGNOSIS — F33.0 MILD EPISODE OF RECURRENT MAJOR DEPRESSIVE DISORDER (HCC): ICD-10-CM

## 2024-03-12 DIAGNOSIS — D22.9 NEVUS: ICD-10-CM

## 2024-03-12 DIAGNOSIS — E03.9 ACQUIRED HYPOTHYROIDISM: ICD-10-CM

## 2024-03-12 PROBLEM — Z72.0 NICOTINE ABUSE: Status: RESOLVED | Noted: 2020-12-01 | Resolved: 2024-03-12

## 2024-03-12 PROCEDURE — 99395 PREV VISIT EST AGE 18-39: CPT | Performed by: INTERNAL MEDICINE

## 2024-03-12 RX ORDER — CITALOPRAM HYDROBROMIDE 10 MG/1
10 TABLET ORAL DAILY
Qty: 90 TABLET | Refills: 0 | Status: SHIPPED | OUTPATIENT
Start: 2024-03-12

## 2024-03-12 RX ORDER — LEVOTHYROXINE SODIUM 0.07 MG/1
TABLET ORAL
Qty: 60 TABLET | Refills: 0 | Status: SHIPPED | OUTPATIENT
Start: 2024-03-12

## 2024-03-12 NOTE — PROGRESS NOTES
ADULT ANNUAL PHYSICAL  [facility] PRIMARY CARE [address]     NAME: Susana Burns  AGE: 33 y.o. SEX: female  : 1990     DATE: 3/12/2024     Assessment and Plan:     Problem List Items Addressed This Visit          Behavioral Health    Mild episode of recurrent major depressive disorder (HCC)     Mood well controlled with current Celexa, call with new/worse mood          Other Visit Diagnoses       Annual physical exam    -  Primary            Immunizations and preventive care screenings were discussed with patient today. Appropriate education was printed on patient's after visit summary.    Counseling:  Alcohol/drug use: discussed moderation in alcohol intake, the recommendations for healthy alcohol use, and avoidance of illicit drug use.  Dental Health: discussed importance of regular tooth brushing, flossing, and dental visits.  Injury prevention: discussed safety/seat belts, safety helmets, smoke detectors, carbon dioxide detectors, and smoking near bedding or upholstery.  Exercise: the importance of regular exercise/physical activity was discussed. Recommend exercise 3-5 times per week for at least 30 minutes.   Cervical cancer screening: PAP     Cousin who  in  from colon cancer    Depression Screening and Follow-up Plan: Patient was screened for depression during today's encounter. They screened negative with a PHQ-9 score of 0.        Return in about 6 months (around 2024) for Recheck.     Chief Complaint:     Chief Complaint   Patient presents with    Follow-up     6 month follow up       History of Present Illness:     Adult Annual Physical   Patient here for a comprehensive physical exam. The patient reports no problems.    She has a mole at bottom of abd. She feels it is new and she thinks it is a bit tender. She notes some bleeding when she nicked it with shaving.      Diet and Physical Activity  Diet/Nutrition: well balanced  diet and consuming 3-5 servings of fruits/vegetables daily.   Exercise: walking.      Depression Screening  PHQ-2/9 Depression Screening    Little interest or pleasure in doing things: 0 - not at all  Feeling down, depressed, or hopeless: 0 - not at all  Trouble falling or staying asleep, or sleeping too much: 0 - not at all  Feeling tired or having little energy: 0 - not at all  Poor appetite or overeatin - not at all  Feeling bad about yourself - or that you are a failure or have let yourself or your family down: 0 - not at all  Trouble concentrating on things, such as reading the newspaper or watching television: 0 - not at all  Moving or speaking so slowly that other people could have noticed. Or the opposite - being so fidgety or restless that you have been moving around a lot more than usual: 0 - not at all  Thoughts that you would be better off dead, or of hurting yourself in some way: 0 - not at all  PHQ-9 Score: 0  PHQ-9 Interpretation: No or Minimal depression       General Health  Sleep: sleeps well.   Hearing: normal - none .  Vision: goes for regular eye exams and wears glasses.   Dental: regular dental visits and brushes teeth twice daily.       /GYN Health  Follows with gynecology? yes   Last menstrual period: currently pregnant  Contraceptive method:  currently pregnant .  History of STDs?: no.     Advanced Care Planning  Do you have an advanced directive? no  Do you have a durable medical power of ? no  ACP document given to the patient? no      Review of Systems:     Review of Systems   Constitutional:  Positive for fatigue. Negative for chills, fever and unexpected weight change.   HENT:  Negative for congestion and sore throat.    Eyes:  Negative for pain and redness.   Respiratory:  Negative for cough, shortness of breath and wheezing.    Cardiovascular:  Negative for chest pain, palpitations and leg swelling.   Gastrointestinal:  Negative for abdominal pain, blood in stool,  constipation, diarrhea, nausea and vomiting.   Genitourinary:  Negative for difficulty urinating, dysuria and vaginal bleeding.   Musculoskeletal:  Positive for back pain. Negative for neck pain.   Skin:  Negative for rash and wound.   Neurological:  Negative for dizziness and headaches.   Hematological:  Does not bruise/bleed easily.   Psychiatric/Behavioral:  Negative for confusion and dysphoric mood.       Past Medical History:     Past Medical History:   Diagnosis Date    Hypothyroidism       Past Surgical History:     History reviewed. No pertinent surgical history.   Social History:     Social History     Socioeconomic History    Marital status: /Civil Union     Spouse name: None    Number of children: None    Years of education: None    Highest education level: None   Occupational History    Occupation: behavioral therapist   Tobacco Use    Smoking status: Former     Current packs/day: 0.00     Types: Cigarettes     Quit date: 3/17/2022     Years since quittin.9     Passive exposure: Yes    Smokeless tobacco: Never    Tobacco comments:     2 cig a day since age 13   Vaping Use    Vaping status: Never Used   Substance and Sexual Activity    Alcohol use: Yes    Drug use: Never    Sexual activity: Yes     Partners: Male     Birth control/protection: None   Other Topics Concern    None   Social History Narrative    None     Social Determinants of Health     Financial Resource Strain: Not on file   Food Insecurity: Not on file   Transportation Needs: Not on file   Physical Activity: Not on file   Stress: Not on file   Social Connections: Not on file   Intimate Partner Violence: Not on file   Housing Stability: Not on file      Family History:     Family History   Problem Relation Age of Onset    Thyroid disease unspecified Mother     Heart disease Mother     Stroke Mother     Hyperthyroidism Mother     Diabetes Brother     No Known Problems Father     Colon cancer Cousin       Current Medications:  "    Current Outpatient Medications   Medication Sig Dispense Refill    aspirin 81 mg chewable tablet Chew 1 tablet (81 mg total) daily at bedtime (Patient taking differently: Chew 162 mg daily at bedtime) 90 tablet 1    citalopram (CeleXA) 10 mg tablet Take 1 tablet (10 mg total) by mouth daily 90 tablet 0    Ferrous Sulfate ER (Slow Fe) 142 (45 Fe) MG TBCR Take by mouth      levothyroxine 75 mcg tablet Take 1 tablet 6 days a week and 2 tablets on Sunday. 60 tablet 0    Prenatal Vit-Fe Fumarate-FA (PRENATAL VITAMINS PO) Take 1 tablet by mouth daily       No current facility-administered medications for this visit.      Allergies:     No Known Allergies   Physical Exam:     /79 (BP Location: Left arm, Patient Position: Sitting, Cuff Size: Standard)   Pulse 71   Temp 97.6 °F (36.4 °C) (Tympanic)   Ht 5' 6\" (1.676 m)   Wt 103 kg (227 lb 12.8 oz)   LMP  (LMP Unknown)   SpO2 98%   BMI 36.77 kg/m²     Physical Exam  Vitals and nursing note reviewed.   Constitutional:       General: She is not in acute distress.     Appearance: She is well-developed. She is not ill-appearing.   HENT:      Head: Normocephalic and atraumatic.      Right Ear: Tympanic membrane and external ear normal. There is no impacted cerumen.      Left Ear: Tympanic membrane and external ear normal. There is no impacted cerumen.      Mouth/Throat:      Mouth: Mucous membranes are moist.      Pharynx: Oropharynx is clear. No oropharyngeal exudate.   Eyes:      General:         Right eye: No discharge.         Left eye: No discharge.      Conjunctiva/sclera: Conjunctivae normal.   Neck:      Thyroid: No thyromegaly.      Trachea: No tracheal deviation.   Cardiovascular:      Rate and Rhythm: Normal rate and regular rhythm.      Heart sounds: Normal heart sounds. No murmur heard.  Pulmonary:      Effort: Pulmonary effort is normal. No respiratory distress.      Breath sounds: Normal breath sounds. No wheezing, rhonchi or rales.   Abdominal:     "  Palpations: Abdomen is soft.      Comments: Gravid uterus   Musculoskeletal:         General: No deformity or signs of injury.      Cervical back: Neck supple.   Lymphadenopathy:      Cervical: No cervical adenopathy.   Skin:     General: Skin is warm and dry.      Coloration: Skin is not pale.      Findings: No bruising or rash.      Comments: Light brown slightly raised nevus suprapubic region, round/symmetric and approx 4 mm in size   Neurological:      General: No focal deficit present.      Mental Status: She is alert. Mental status is at baseline.      Motor: No abnormal muscle tone.      Gait: Gait normal.   Psychiatric:         Mood and Affect: Mood normal.         Behavior: Behavior normal.         Thought Content: Thought content normal.         Judgment: Judgment normal.          Rere Srinivasan DO   [facility] PRIMARY [facility]ARE [address]

## 2024-03-18 PROBLEM — Z3A.35 35 WEEKS GESTATION OF PREGNANCY: Status: ACTIVE | Noted: 2023-11-15

## 2024-03-19 ENCOUNTER — ROUTINE PRENATAL (OUTPATIENT)
Dept: OBGYN CLINIC | Facility: CLINIC | Age: 34
End: 2024-03-19
Payer: COMMERCIAL

## 2024-03-19 VITALS
SYSTOLIC BLOOD PRESSURE: 98 MMHG | BODY MASS INDEX: 36.96 KG/M2 | HEIGHT: 66 IN | WEIGHT: 230 LBS | DIASTOLIC BLOOD PRESSURE: 68 MMHG

## 2024-03-19 DIAGNOSIS — O99.280 HYPOTHYROID IN PREGNANCY, ANTEPARTUM: Primary | ICD-10-CM

## 2024-03-19 DIAGNOSIS — Z3A.35 35 WEEKS GESTATION OF PREGNANCY: ICD-10-CM

## 2024-03-19 DIAGNOSIS — E03.9 HYPOTHYROID IN PREGNANCY, ANTEPARTUM: Primary | ICD-10-CM

## 2024-03-19 DIAGNOSIS — F41.9 ANXIETY DURING PREGNANCY IN THIRD TRIMESTER, ANTEPARTUM: ICD-10-CM

## 2024-03-19 DIAGNOSIS — O99.343 ANXIETY DURING PREGNANCY IN THIRD TRIMESTER, ANTEPARTUM: ICD-10-CM

## 2024-03-19 DIAGNOSIS — O99.013 ANEMIA AFFECTING PREGNANCY IN THIRD TRIMESTER: ICD-10-CM

## 2024-03-19 DIAGNOSIS — O09.813 PREGNANCY RESULTING FROM IN VITRO FERTILIZATION IN THIRD TRIMESTER: ICD-10-CM

## 2024-03-19 LAB
SL AMB  POCT GLUCOSE, UA: NORMAL
SL AMB POCT URINE PROTEIN: NORMAL

## 2024-03-19 PROCEDURE — 81002 URINALYSIS NONAUTO W/O SCOPE: CPT | Performed by: NURSE PRACTITIONER

## 2024-03-19 PROCEDURE — PNV: Performed by: NURSE PRACTITIONER

## 2024-03-19 NOTE — PROGRESS NOTES
"Routine Prenatal Visit  [facility] - [place]  [address], [place] [address]    Assessment/Plan:  Susana is a 33 y.o. year old  at 35w4d who presents for routine prenatal visit.     1. Hypothyroid in pregnancy, antepartum  Assessment & Plan:  Followed by endocrinology, has order for follow up labs      2. Anxiety during pregnancy in third trimester, antepartum    3. 35 weeks gestation of pregnancy  -     POCT urine dip    4. Pregnancy resulting from in vitro fertilization in third trimester  Assessment & Plan:  Start weekly NST/RALPH, follow up growth scan scheduled   Discontinue ASA at 36 weeks      5. Anemia affecting pregnancy in third trimester  Assessment & Plan:  Taking Slow Fe, has order for repeat labs      Doing well. Call office with any concerns, ctx, lof, vb, decreased fm    Next OB Visit 1 weeks.    Subjective:     CC: Prenatal care    Susana Burns is a 33 y.o.  female who presents for routine prenatal care at 35w4d.  Pregnancy ROS: no leakage of fluid, pelvic pain, or vaginal bleeding.  normal fetal movement.    The following portions of the patient's history were reviewed and updated as appropriate: allergies, current medications, past family history, past medical history, obstetric history, gynecologic history, past social history, past surgical history and problem list.      Objective:  BP 98/68 (BP Location: Right arm, Patient Position: Sitting, Cuff Size: Standard)   Ht 5' 6\" (1.676 m)   Wt 104 kg (230 lb)   LMP  (LMP Unknown)   BMI 37.12 kg/m²   Pregravid Weight/BMI: 94.3 kg (208 lb) (BMI 33.59)  Current Weight: 104 kg (230 lb)   Total Weight Gain: 9.979 kg (22 lb)   Pre-Ericka Vitals      Flowsheet Row Most Recent Value   Prenatal Assessment    Fetal Heart Rate 128   Fundal Height (cm) 36 cm   Movement Present   Presentation Vertex   Prenatal Vitals    Blood Pressure 98/68   Weight - Scale 104 kg (230 lb)   Urine Albumin/Glucose  "   Dilation/Effacement/Station    Vaginal Drainage    Draining Fluid No   Edema    LLE Edema None   RLE Edema None   Facial Edema None             General: Well appearing, no distress  Abdomen: Soft, gravid, nontender  Extremities: Non tender.

## 2024-03-20 ENCOUNTER — ULTRASOUND (OUTPATIENT)
Dept: PERINATAL CARE | Facility: OTHER | Age: 34
End: 2024-03-20
Payer: COMMERCIAL

## 2024-03-20 VITALS
DIASTOLIC BLOOD PRESSURE: 66 MMHG | BODY MASS INDEX: 36.64 KG/M2 | HEIGHT: 66 IN | WEIGHT: 228 LBS | HEART RATE: 67 BPM | SYSTOLIC BLOOD PRESSURE: 100 MMHG

## 2024-03-20 DIAGNOSIS — Z3A.35 35 WEEKS GESTATION OF PREGNANCY: ICD-10-CM

## 2024-03-20 DIAGNOSIS — O09.813 PREGNANCY RESULTING FROM IN VITRO FERTILIZATION IN THIRD TRIMESTER: Primary | ICD-10-CM

## 2024-03-20 PROCEDURE — 59025 FETAL NON-STRESS TEST: CPT | Performed by: OBSTETRICS & GYNECOLOGY

## 2024-03-20 PROCEDURE — 76815 OB US LIMITED FETUS(S): CPT | Performed by: OBSTETRICS & GYNECOLOGY

## 2024-03-20 NOTE — PROGRESS NOTES
Non-Stress Testing:    Non-Stress test, equipment, procedure, and expected outcomes explained. Reviewed fetal kick counts and when to call OB.Verified patient understanding of fetal kick counts with teach back method. Patient reports feeling daily fetal movements. Patient has no questions or concerns. She denies feeling contractions. She states that she feels intermittent tightening but denies any pain.

## 2024-03-26 ENCOUNTER — ULTRASOUND (OUTPATIENT)
Age: 34
End: 2024-03-26
Payer: COMMERCIAL

## 2024-03-26 VITALS
DIASTOLIC BLOOD PRESSURE: 64 MMHG | HEART RATE: 80 BPM | SYSTOLIC BLOOD PRESSURE: 118 MMHG | BODY MASS INDEX: 37.12 KG/M2 | WEIGHT: 231 LBS | HEIGHT: 66 IN

## 2024-03-26 DIAGNOSIS — O09.813 PREGNANCY RESULTING FROM IN VITRO FERTILIZATION IN THIRD TRIMESTER: Primary | ICD-10-CM

## 2024-03-26 DIAGNOSIS — E66.9 OBESITY (BMI 30.0-34.9): ICD-10-CM

## 2024-03-26 DIAGNOSIS — O35.EXX0 FETAL RENAL ANOMALY, SINGLE GESTATION: ICD-10-CM

## 2024-03-26 DIAGNOSIS — Z3A.36 36 WEEKS GESTATION OF PREGNANCY: ICD-10-CM

## 2024-03-26 PROCEDURE — 99214 OFFICE O/P EST MOD 30 MIN: CPT | Performed by: OBSTETRICS & GYNECOLOGY

## 2024-03-26 PROCEDURE — 76816 OB US FOLLOW-UP PER FETUS: CPT | Performed by: OBSTETRICS & GYNECOLOGY

## 2024-03-26 NOTE — LETTER
March 26, 2024     Shar Proctor MD  [address]  [address]  [facility] [address]    Patient: Susana Burns   YOB: 1990   Date of Visit: 3/26/2024       Dear Dr. Proctor:    Thank you for referring Ssuana Burns to me for evaluation. Below are my notes for this consultation.    If you have questions, please do not hesitate to call me. I look forward to following your patient along with you.         Sincerely,        Jorge Ray MD        CC: No Recipients    Jorge Ray MD  3/26/2024  3:13 PM  Sign when Signing Visit  A fetal ultrasound was completed. See Ob procedures in Epic for an interpretation and recommendations. Do not hesitate to contact us in [facility] if you have questions.    Jorge Ray MD, MSCE  Maternal Fetal Medicine

## 2024-03-26 NOTE — PROGRESS NOTES
A fetal ultrasound was completed. See Ob procedures in Epic for an interpretation and recommendations. Do not hesitate to contact us in [facility] if you have questions.    Jorge Ray MD, MSCE  Maternal Fetal Medicine

## 2024-03-26 NOTE — PROGRESS NOTES
Repeat Non-Stress Testing:    Patient verbalizes +FM. Pt denies ALL:               Leaking of fluid   Contractions   Vaginal bleeding   Decreased fetal movement    Patient is performing daily kick counts. Patient has no questions or concerns.

## 2024-03-27 PROBLEM — Z3A.36 36 WEEKS GESTATION OF PREGNANCY: Status: ACTIVE | Noted: 2023-11-15

## 2024-03-27 LAB
ERYTHROCYTE [DISTWIDTH] IN BLOOD BY AUTOMATED COUNT: 13 % (ref 11.7–15.4)
FERRITIN SERPL-MCNC: 30 NG/ML (ref 15–150)
HCT VFR BLD AUTO: 34.8 % (ref 34–46.6)
HGB BLD-MCNC: 11.6 G/DL (ref 11.1–15.9)
MCH RBC QN AUTO: 30.4 PG (ref 26.6–33)
MCHC RBC AUTO-ENTMCNC: 33.3 G/DL (ref 31.5–35.7)
MCV RBC AUTO: 91 FL (ref 79–97)
PLATELET # BLD AUTO: 222 X10E3/UL (ref 150–450)
RBC # BLD AUTO: 3.82 X10E6/UL (ref 3.77–5.28)
T4 FREE SERPL-MCNC: 1.06 NG/DL (ref 0.82–1.77)
TSH SERPL DL<=0.005 MIU/L-ACNC: 1.17 UIU/ML (ref 0.45–4.5)
WBC # BLD AUTO: 11 X10E3/UL (ref 3.4–10.8)

## 2024-03-28 ENCOUNTER — ROUTINE PRENATAL (OUTPATIENT)
Dept: OBGYN CLINIC | Facility: CLINIC | Age: 34
End: 2024-03-28
Payer: COMMERCIAL

## 2024-03-28 VITALS
DIASTOLIC BLOOD PRESSURE: 74 MMHG | BODY MASS INDEX: 37.41 KG/M2 | SYSTOLIC BLOOD PRESSURE: 116 MMHG | WEIGHT: 232.8 LBS | HEIGHT: 66 IN

## 2024-03-28 DIAGNOSIS — O99.280 HYPOTHYROID IN PREGNANCY, ANTEPARTUM: ICD-10-CM

## 2024-03-28 DIAGNOSIS — O99.013 ANEMIA AFFECTING PREGNANCY IN THIRD TRIMESTER: ICD-10-CM

## 2024-03-28 DIAGNOSIS — Z3A.36 36 WEEKS GESTATION OF PREGNANCY: ICD-10-CM

## 2024-03-28 DIAGNOSIS — E03.9 HYPOTHYROID IN PREGNANCY, ANTEPARTUM: ICD-10-CM

## 2024-03-28 DIAGNOSIS — O09.813 PREGNANCY RESULTING FROM IN VITRO FERTILIZATION IN THIRD TRIMESTER: Primary | ICD-10-CM

## 2024-03-28 DIAGNOSIS — O99.343 ANXIETY DURING PREGNANCY IN THIRD TRIMESTER, ANTEPARTUM: ICD-10-CM

## 2024-03-28 DIAGNOSIS — F41.9 ANXIETY DURING PREGNANCY IN THIRD TRIMESTER, ANTEPARTUM: ICD-10-CM

## 2024-03-28 DIAGNOSIS — Z36.85 ENCOUNTER FOR ANTENATAL SCREENING FOR STREPTOCOCCUS B: ICD-10-CM

## 2024-03-28 PROBLEM — N13.30 PYELECTASIS: Status: ACTIVE | Noted: 2024-03-28

## 2024-03-28 LAB
SL AMB  POCT GLUCOSE, UA: NORMAL
SL AMB POCT URINE PROTEIN: NORMAL

## 2024-03-28 PROCEDURE — 81002 URINALYSIS NONAUTO W/O SCOPE: CPT | Performed by: OBSTETRICS & GYNECOLOGY

## 2024-03-28 PROCEDURE — PNV: Performed by: OBSTETRICS & GYNECOLOGY

## 2024-03-28 NOTE — PROGRESS NOTES
"Routine Prenatal Visit  [facility] [place]  [address], [place] [address]    Assessment/Plan:  Susana is a 33 y.o. year old  at 36w6d who presents for routine prenatal visit.     1. Pregnancy resulting from in vitro fertilization in third trimester  Assessment & Plan:  Cont with  testing      2. 36 weeks gestation of pregnancy  Assessment & Plan:  GBS done    - Labor/Bleeding/ROM precautions given. Kick counts reviewed  - GBS swab collected today.  - Delivery consent reviewed and signed today. Risks of delivery reviewed, including bleeding, infection, damage to surrounding organs/structures (specifically bowel, bladder, vessels, nerves, ureters), need for operative vaginal delivery or  delivery, hysterectomy, need for blood transfusion, need for further surgery.   - Problem list updated, results console reviewed and updated with pertinent prenatal labs.  - PMH, PSH, medications reviewed and updated as needed  - Return to office in 1 wk(s) for routine prenatal care     Orders:  -     POCT urine dip    3. Hypothyroid in pregnancy, antepartum    4. Anxiety during pregnancy in third trimester, antepartum    5. Anemia affecting pregnancy in third trimester          Subjective:   Susana Burns is a 33 y.o.  who presents for routine prenatal care at 36w6d.  Complaints today: Denies  LOF: -; VB: -; Contractions: -; FM: +    Objective:  /74 (BP Location: Left arm, Patient Position: Sitting, Cuff Size: Standard)   Ht 5' 6\" (1.676 m)   Wt 106 kg (232 lb 12.8 oz)   LMP  (LMP Unknown)   BMI 37.57 kg/m²     General: Well appearing, no distress  Respiratory: Unlabored breathing  Abdomen: Soft, gravid, nontender  Extremities: Warm and well perfused.  Non tender.    Pregravid Weight/BMI: 94.3 kg (208 lb) (BMI 33.59)  Current Weight: 106 kg (232 lb 12.8 oz)   Total Weight Gain: 11.2 kg (24 lb 12.8 oz)     Pre-Ericka Vitals      Flowsheet Row Most Recent Value   Prenatal " Assessment    Fetal Heart Rate 135   Fundal Height (cm) 36 cm   Movement Present   Presentation Vertex   Prenatal Vitals    Blood Pressure 116/74   Weight - Scale 106 kg (232 lb 12.8 oz)   Urine Albumin/Glucose    Dilation/Effacement/Station    Vaginal Drainage    Edema              Reese Stewart DO  3/28/2024 3:10 PM

## 2024-03-28 NOTE — ASSESSMENT & PLAN NOTE
GBS done    - Labor/Bleeding/ROM precautions given. Kick counts reviewed  - GBS swab collected today.  - Delivery consent reviewed and signed today. Risks of delivery reviewed, including bleeding, infection, damage to surrounding organs/structures (specifically bowel, bladder, vessels, nerves, ureters), need for operative vaginal delivery or  delivery, hysterectomy, need for blood transfusion, need for further surgery.   - Problem list updated, results console reviewed and updated with pertinent prenatal labs.  - PMH, PSH, medications reviewed and updated as needed  - Return to office in 1 wk(s) for routine prenatal care

## 2024-03-30 LAB — GP B STREP DNA SPEC QL NAA+PROBE: NEGATIVE

## 2024-04-02 NOTE — PROGRESS NOTES
Please refer to the [facility] ultrasound report in Ob Procedures for additional information regarding today's visit

## 2024-04-03 ENCOUNTER — ULTRASOUND (OUTPATIENT)
Dept: PERINATAL CARE | Facility: OTHER | Age: 34
End: 2024-04-03
Payer: COMMERCIAL

## 2024-04-03 VITALS
WEIGHT: 231.6 LBS | HEART RATE: 79 BPM | SYSTOLIC BLOOD PRESSURE: 102 MMHG | BODY MASS INDEX: 37.22 KG/M2 | DIASTOLIC BLOOD PRESSURE: 68 MMHG | HEIGHT: 66 IN

## 2024-04-03 DIAGNOSIS — Z3A.37 37 WEEKS GESTATION OF PREGNANCY: Primary | ICD-10-CM

## 2024-04-03 DIAGNOSIS — O09.813 PREGNANCY RESULTING FROM IN VITRO FERTILIZATION IN THIRD TRIMESTER: ICD-10-CM

## 2024-04-03 PROCEDURE — 76815 OB US LIMITED FETUS(S): CPT | Performed by: OBSTETRICS & GYNECOLOGY

## 2024-04-03 PROCEDURE — 59025 FETAL NON-STRESS TEST: CPT | Performed by: OBSTETRICS & GYNECOLOGY

## 2024-04-03 NOTE — LETTER
April 3, 2024     Reese Stewart V,   [address]  [address]  [facility] [address]    Patient: Susana Burns   YOB: 1990   Date of Visit: 4/3/2024       Dear Dr. Stewart:    Thank you for referring Susana Burns to me for evaluation. Below are my notes for this consultation.    If you have questions, please do not hesitate to call me. I look forward to following your patient along with you.         Sincerely,        Kyle Michael MD        CC: No Recipients    Kyle Michael MD  4/3/2024  9:01 AM  Sign when Signing Visit  Please refer to the [facility] ultrasound report in Ob Procedures for additional information regarding today's visit

## 2024-04-04 ENCOUNTER — ROUTINE PRENATAL (OUTPATIENT)
Dept: OBGYN CLINIC | Facility: CLINIC | Age: 34
End: 2024-04-04
Payer: COMMERCIAL

## 2024-04-04 VITALS
WEIGHT: 231 LBS | BODY MASS INDEX: 37.12 KG/M2 | SYSTOLIC BLOOD PRESSURE: 100 MMHG | DIASTOLIC BLOOD PRESSURE: 70 MMHG | HEIGHT: 66 IN

## 2024-04-04 DIAGNOSIS — Z3A.37 37 WEEKS GESTATION OF PREGNANCY: Primary | ICD-10-CM

## 2024-04-04 DIAGNOSIS — O99.343 ANXIETY DURING PREGNANCY IN THIRD TRIMESTER, ANTEPARTUM: ICD-10-CM

## 2024-04-04 DIAGNOSIS — N13.30 PYELECTASIS: ICD-10-CM

## 2024-04-04 DIAGNOSIS — E03.9 HYPOTHYROID IN PREGNANCY, ANTEPARTUM: ICD-10-CM

## 2024-04-04 DIAGNOSIS — O99.280 HYPOTHYROID IN PREGNANCY, ANTEPARTUM: ICD-10-CM

## 2024-04-04 DIAGNOSIS — F41.9 ANXIETY DURING PREGNANCY IN THIRD TRIMESTER, ANTEPARTUM: ICD-10-CM

## 2024-04-04 DIAGNOSIS — O99.013 ANEMIA AFFECTING PREGNANCY IN THIRD TRIMESTER: ICD-10-CM

## 2024-04-04 DIAGNOSIS — O09.813 PREGNANCY RESULTING FROM IN VITRO FERTILIZATION IN THIRD TRIMESTER: ICD-10-CM

## 2024-04-04 LAB
SL AMB  POCT GLUCOSE, UA: NORMAL
SL AMB POCT URINE PROTEIN: NORMAL

## 2024-04-04 PROCEDURE — 81002 URINALYSIS NONAUTO W/O SCOPE: CPT | Performed by: OBSTETRICS & GYNECOLOGY

## 2024-04-04 PROCEDURE — PNV: Performed by: OBSTETRICS & GYNECOLOGY

## 2024-04-04 NOTE — PROGRESS NOTES
"Routine Prenatal Visit  [facility] [place]  [address], [address], [place] [address]    Assessment/Plan:  Susana is a 33 y.o. year old  at 37w6d who presents for routine prenatal visit.     1. 37 weeks gestation of pregnancy  -     POCT urine dip    2. Hypothyroid in pregnancy, antepartum    3. Anxiety during pregnancy in third trimester, antepartum    4. Pregnancy resulting from in vitro fertilization in third trimester    5. Anemia affecting pregnancy in third trimester    6. Pyelectasis        Next OB Visit 1 weeks.    Subjective:     CC: Prenatal care    Susana Burns is a 33 y.o.  female who presents for routine prenatal care at 37w6d.  Pregnancy ROS: no leakage of fluid, pelvic pain, or vaginal bleeding.  normal fetal movement.    The following portions of the patient's history were reviewed and updated as appropriate: allergies, current medications, past family history, past medical history, obstetric history, gynecologic history, past social history, past surgical history and problem list.      Objective:  /70 (BP Location: Left arm, Patient Position: Sitting, Cuff Size: Standard)   Ht 5' 6\" (1.676 m)   Wt 105 kg (231 lb)   LMP  (LMP Unknown)   BMI 37.28 kg/m²   Pregravid Weight/BMI: 94.3 kg (208 lb) (BMI 33.59)  Current Weight: 105 kg (231 lb)   Total Weight Gain: 10.4 kg (23 lb)   Pre-Ericka Vitals      Flowsheet Row Most Recent Value   Prenatal Assessment    Fetal Heart Rate 140   Fundal Height (cm) 38 cm   Movement Present   Presentation Vertex   Prenatal Vitals    Blood Pressure 100/70   Weight - Scale 105 kg (231 lb)   Urine Albumin/Glucose    Dilation/Effacement/Station    Vaginal Drainage    Draining Fluid No   Edema              General: Well appearing, no distress  Abdomen: Soft, gravid, nontender  Extremities: Non tender.Routine Prenatal Visit  [facility] [place]  [address], [address], [place] [address]    Assessment/Plan:  Susana " "is a 33 y.o. year old  at 37w6d who presents for routine prenatal visit.     1. 37 weeks gestation of pregnancy  -     POCT urine dip    2. Hypothyroid in pregnancy, antepartum    3. Anxiety during pregnancy in third trimester, antepartum    4. Pregnancy resulting from in vitro fertilization in third trimester    5. Anemia affecting pregnancy in third trimester    6. Pyelectasis        Next OB Visit 1 weeks.    Subjective:     CC: Prenatal care    Susana Burns is a 33 y.o.  female who presents for routine prenatal care at 37w6d.  Pregnancy ROS: no leakage of fluid, pelvic pain, or vaginal bleeding.  normal fetal movement.    The following portions of the patient's history were reviewed and updated as appropriate: allergies, current medications, past family history, past medical history, obstetric history, gynecologic history, past social history, past surgical history and problem list.      Objective:  /70 (BP Location: Left arm, Patient Position: Sitting, Cuff Size: Standard)   Ht 5' 6\" (1.676 m)   Wt 105 kg (231 lb)   LMP  (LMP Unknown)   BMI 37.28 kg/m²   Pregravid Weight/BMI: 94.3 kg (208 lb) (BMI 33.59)  Current Weight: 105 kg (231 lb)   Total Weight Gain: 10.4 kg (23 lb)   Pre-Ericka Vitals      Flowsheet Row Most Recent Value   Prenatal Assessment    Fetal Heart Rate 140   Fundal Height (cm) 38 cm   Movement Present   Presentation Vertex   Prenatal Vitals    Blood Pressure 100/70   Weight - Scale 105 kg (231 lb)   Urine Albumin/Glucose    Dilation/Effacement/Station    Vaginal Drainage    Draining Fluid No   Edema              General: Well appearing, no distress  Abdomen: Soft, gravid, nontender  Extremities: Non tender.  "

## 2024-04-10 ENCOUNTER — ULTRASOUND (OUTPATIENT)
Dept: PERINATAL CARE | Facility: OTHER | Age: 34
End: 2024-04-10
Payer: COMMERCIAL

## 2024-04-10 VITALS
DIASTOLIC BLOOD PRESSURE: 68 MMHG | HEART RATE: 67 BPM | SYSTOLIC BLOOD PRESSURE: 116 MMHG | HEIGHT: 66 IN | BODY MASS INDEX: 37.32 KG/M2 | WEIGHT: 232.2 LBS

## 2024-04-10 DIAGNOSIS — O09.813 PREGNANCY RESULTING FROM IN VITRO FERTILIZATION IN THIRD TRIMESTER: Primary | ICD-10-CM

## 2024-04-10 DIAGNOSIS — Z3A.38 38 WEEKS GESTATION OF PREGNANCY: ICD-10-CM

## 2024-04-10 PROCEDURE — 76815 OB US LIMITED FETUS(S): CPT | Performed by: OBSTETRICS & GYNECOLOGY

## 2024-04-10 PROCEDURE — 59025 FETAL NON-STRESS TEST: CPT | Performed by: OBSTETRICS & GYNECOLOGY

## 2024-04-11 ENCOUNTER — ROUTINE PRENATAL (OUTPATIENT)
Dept: OBGYN CLINIC | Facility: CLINIC | Age: 34
End: 2024-04-11

## 2024-04-11 VITALS
SYSTOLIC BLOOD PRESSURE: 110 MMHG | BODY MASS INDEX: 37.77 KG/M2 | WEIGHT: 235 LBS | HEIGHT: 66 IN | DIASTOLIC BLOOD PRESSURE: 76 MMHG

## 2024-04-11 DIAGNOSIS — O99.013 ANEMIA AFFECTING PREGNANCY IN THIRD TRIMESTER: ICD-10-CM

## 2024-04-11 DIAGNOSIS — O09.813 PREGNANCY RESULTING FROM IN VITRO FERTILIZATION IN THIRD TRIMESTER: Primary | ICD-10-CM

## 2024-04-11 DIAGNOSIS — O99.280 HYPOTHYROID IN PREGNANCY, ANTEPARTUM: ICD-10-CM

## 2024-04-11 DIAGNOSIS — E03.9 HYPOTHYROID IN PREGNANCY, ANTEPARTUM: ICD-10-CM

## 2024-04-11 DIAGNOSIS — Z3A.38 38 WEEKS GESTATION OF PREGNANCY: ICD-10-CM

## 2024-04-11 LAB
SL AMB  POCT GLUCOSE, UA: ABNORMAL
SL AMB POCT URINE PROTEIN: ABNORMAL

## 2024-04-11 NOTE — PROGRESS NOTES
"Routine Prenatal Visit  [facility] - [place]  [address], [place] [address]    Assessment/Plan:  Susana is a 33 y.o. year old  at 38w6d who presents for routine prenatal visit.     1. Pregnancy resulting from in vitro fertilization in third trimester  Assessment & Plan:  Discussed delivery plans. Generally we recommend delivery of an IVF pregnancy by the EDC. She is interested in labor induction on her EDC. Her cervix is unfavorable and she would need cervical ripening the evening prior. Reviewed process with her and her . Questions answered and consent obtained. Will reexamine next week to confirm the need for ripening. Continue fetal testing as scheduled.      2. Hypothyroid in pregnancy, antepartum    3. Anemia affecting pregnancy in third trimester    4. 38 weeks gestation of pregnancy  -     POCT urine dip          Subjective:     CC: Prenatal care    Susana Burns is a 33 y.o.  female who presents for routine prenatal care at 38w6d.  Pregnancy ROS: no leakage of fluid, pelvic pain, or vaginal bleeding.  normal fetal movement.    The following portions of the patient's history were reviewed and updated as appropriate: allergies, current medications, past family history, past medical history, obstetric history, gynecologic history, past social history, past surgical history and problem list.      Objective:  /76 (BP Location: Left arm, Patient Position: Sitting, Cuff Size: Standard)   Ht 5' 6\" (1.676 m)   Wt 107 kg (235 lb)   LMP  (LMP Unknown)   BMI 37.93 kg/m²   Pregravid Weight/BMI: 94.3 kg (208 lb) (BMI 33.59)  Current Weight: 107 kg (235 lb)   Total Weight Gain: 12.2 kg (27 lb)   Pre-Ericka Vitals      Flowsheet Row Most Recent Value   Prenatal Assessment    Fetal Heart Rate 135   Fundal Height (cm) 39 cm   Movement Present   Presentation Vertex   Prenatal Vitals    Blood Pressure 110/76   Weight - Scale 107 kg (235 lb)   Urine Albumin/Glucose  "   Dilation/Effacement/Station    Cervical Dilation 0   Cervical Effacement 0   Fetal Station -3   Vaginal Drainage    Edema              General: Well appearing, no distress  Respiratory: Unlabored breathing  Cardiovascular: Regular rate.  Abdomen: Soft, gravid, nontender  Fundal Height: Appropriate for gestational age.  Extremities: Warm and well perfused.  Non tender.

## 2024-04-12 PROBLEM — Z3A.38 38 WEEKS GESTATION OF PREGNANCY: Status: ACTIVE | Noted: 2023-11-15

## 2024-04-12 NOTE — ASSESSMENT & PLAN NOTE
Discussed delivery plans. Generally we recommend delivery of an IVF pregnancy by the EDC. She is interested in labor induction on her EDC. Her cervix is unfavorable and she would need cervical ripening the evening prior. Reviewed process with her and her . Questions answered and consent obtained. Will reexamine next week to confirm the need for ripening. Continue fetal testing as scheduled.

## 2024-04-13 NOTE — PROGRESS NOTES
Please refer to the [facility] ultrasound report in Ob Procedures for additional information regarding today's visit

## 2024-04-15 ENCOUNTER — ULTRASOUND (OUTPATIENT)
Dept: PERINATAL CARE | Facility: OTHER | Age: 34
End: 2024-04-15
Payer: COMMERCIAL

## 2024-04-15 VITALS
SYSTOLIC BLOOD PRESSURE: 114 MMHG | HEART RATE: 79 BPM | WEIGHT: 235.6 LBS | BODY MASS INDEX: 37.86 KG/M2 | DIASTOLIC BLOOD PRESSURE: 66 MMHG | HEIGHT: 66 IN

## 2024-04-15 DIAGNOSIS — O09.813 PREGNANCY RESULTING FROM IN VITRO FERTILIZATION IN THIRD TRIMESTER: ICD-10-CM

## 2024-04-15 DIAGNOSIS — Z3A.39 39 WEEKS GESTATION OF PREGNANCY: Primary | ICD-10-CM

## 2024-04-15 PROCEDURE — 76815 OB US LIMITED FETUS(S): CPT | Performed by: OBSTETRICS & GYNECOLOGY

## 2024-04-15 PROCEDURE — 59025 FETAL NON-STRESS TEST: CPT | Performed by: OBSTETRICS & GYNECOLOGY

## 2024-04-15 NOTE — ASSESSMENT & PLAN NOTE
Reviewed desired range thyroid testing results   1 Principal Discharge DX:	Back pain  Secondary Diagnosis:	Cervical strain

## 2024-04-15 NOTE — LETTER
April 15, 2024     Shar Proctor MD  [address]  [address]  [facility] [address]    Patient: Susana Burns   YOB: 1990   Date of Visit: 4/15/2024       Dear Dr. Proctor:    Thank you for referring Susana Burns to me for evaluation. Below are my notes for this consultation.    If you have questions, please do not hesitate to call me. I look forward to following your patient along with you.         Sincerely,        Kyle Michael MD        CC: No Recipients    Kyle Michael MD  4/15/2024 12:57 PM  Sign when Signing Visit  Please refer to the [facility] ultrasound report in Ob Procedures for additional information regarding today's visit

## 2024-04-18 ENCOUNTER — ANESTHESIA (INPATIENT)
Dept: LABOR AND DELIVERY | Facility: HOSPITAL | Age: 34
End: 2024-04-18
Payer: COMMERCIAL

## 2024-04-18 ENCOUNTER — HOSPITAL ENCOUNTER (INPATIENT)
Facility: HOSPITAL | Age: 34
LOS: 3 days | Discharge: HOME | End: 2024-04-21
Attending: OBSTETRICS & GYNECOLOGY | Admitting: OBSTETRICS & GYNECOLOGY
Payer: COMMERCIAL

## 2024-04-18 ENCOUNTER — ROUTINE PRENATAL (OUTPATIENT)
Dept: OBGYN CLINIC | Facility: CLINIC | Age: 34
End: 2024-04-18
Payer: COMMERCIAL

## 2024-04-18 ENCOUNTER — HOSPITAL ENCOUNTER (OUTPATIENT)
Dept: LABOR AND DELIVERY | Facility: HOSPITAL | Age: 34
Discharge: HOME | End: 2024-04-18
Payer: COMMERCIAL

## 2024-04-18 VITALS
HEIGHT: 66 IN | DIASTOLIC BLOOD PRESSURE: 72 MMHG | WEIGHT: 235 LBS | SYSTOLIC BLOOD PRESSURE: 122 MMHG | BODY MASS INDEX: 37.77 KG/M2

## 2024-04-18 DIAGNOSIS — O99.280 HYPOTHYROID IN PREGNANCY, ANTEPARTUM: ICD-10-CM

## 2024-04-18 DIAGNOSIS — O09.813 PREGNANCY RESULTING FROM IN VITRO FERTILIZATION IN THIRD TRIMESTER: Primary | ICD-10-CM

## 2024-04-18 DIAGNOSIS — Z3A.39 39 WEEKS GESTATION OF PREGNANCY: ICD-10-CM

## 2024-04-18 DIAGNOSIS — N13.30 PYELECTASIS: ICD-10-CM

## 2024-04-18 DIAGNOSIS — O99.343 ANXIETY DURING PREGNANCY IN THIRD TRIMESTER, ANTEPARTUM: ICD-10-CM

## 2024-04-18 DIAGNOSIS — E03.9 HYPOTHYROID IN PREGNANCY, ANTEPARTUM: ICD-10-CM

## 2024-04-18 DIAGNOSIS — F41.9 ANXIETY DURING PREGNANCY IN THIRD TRIMESTER, ANTEPARTUM: ICD-10-CM

## 2024-04-18 LAB
ABO GROUP BLD: NORMAL
BASOPHILS # BLD AUTO: 0.03 THOUSANDS/ÂΜL (ref 0–0.1)
BASOPHILS NFR BLD AUTO: 0 % (ref 0–1)
BLD GP AB SCN SERPL QL: NEGATIVE
EOSINOPHIL # BLD AUTO: 0.13 THOUSAND/ÂΜL (ref 0–0.61)
EOSINOPHIL NFR BLD AUTO: 1 % (ref 0–6)
ERYTHROCYTE [DISTWIDTH] IN BLOOD BY AUTOMATED COUNT: 13.2 % (ref 11.6–15.1)
HCT VFR BLD AUTO: 35.5 % (ref 34.8–46.1)
HGB BLD-MCNC: 11.7 G/DL (ref 11.5–15.4)
HOLD SPECIMEN: NORMAL
IMM GRANULOCYTES # BLD AUTO: 0.11 THOUSAND/UL (ref 0–0.2)
IMM GRANULOCYTES NFR BLD AUTO: 1 % (ref 0–2)
LYMPHOCYTES # BLD AUTO: 1.95 THOUSANDS/ÂΜL (ref 0.6–4.47)
LYMPHOCYTES NFR BLD AUTO: 17 % (ref 14–44)
MCH RBC QN AUTO: 30.2 PG (ref 26.8–34.3)
MCHC RBC AUTO-ENTMCNC: 33 G/DL (ref 31.4–37.4)
MCV RBC AUTO: 92 FL (ref 82–98)
MONOCYTES # BLD AUTO: 0.81 THOUSAND/ÂΜL (ref 0.17–1.22)
MONOCYTES NFR BLD AUTO: 7 % (ref 4–12)
NEUTROPHILS # BLD AUTO: 8.78 THOUSANDS/ÂΜL (ref 1.85–7.62)
NEUTS SEG NFR BLD AUTO: 74 % (ref 43–75)
NRBC BLD AUTO-RTO: 0 /100 WBCS
PLATELET # BLD AUTO: 231 THOUSANDS/UL (ref 149–390)
PMV BLD AUTO: 10.9 FL (ref 8.9–12.7)
RBC # BLD AUTO: 3.88 MILLION/UL (ref 3.81–5.12)
RH BLD: POSITIVE
SL AMB  POCT GLUCOSE, UA: ABNORMAL
SL AMB POCT URINE PROTEIN: ABNORMAL
SPECIMEN EXPIRATION DATE: NORMAL
WBC # BLD AUTO: 11.81 THOUSAND/UL (ref 4.31–10.16)

## 2024-04-18 PROCEDURE — 86850 RBC ANTIBODY SCREEN: CPT | Performed by: OBSTETRICS & GYNECOLOGY

## 2024-04-18 PROCEDURE — NC001 PR NO CHARGE: Performed by: OBSTETRICS & GYNECOLOGY

## 2024-04-18 PROCEDURE — PNV: Performed by: OBSTETRICS & GYNECOLOGY

## 2024-04-18 PROCEDURE — 86900 BLOOD TYPING SEROLOGIC ABO: CPT | Performed by: OBSTETRICS & GYNECOLOGY

## 2024-04-18 PROCEDURE — 86901 BLOOD TYPING SEROLOGIC RH(D): CPT | Performed by: OBSTETRICS & GYNECOLOGY

## 2024-04-18 PROCEDURE — 85025 COMPLETE CBC W/AUTO DIFF WBC: CPT | Performed by: OBSTETRICS & GYNECOLOGY

## 2024-04-18 PROCEDURE — 86780 TREPONEMA PALLIDUM: CPT | Performed by: OBSTETRICS & GYNECOLOGY

## 2024-04-18 PROCEDURE — 81002 URINALYSIS NONAUTO W/O SCOPE: CPT | Performed by: OBSTETRICS & GYNECOLOGY

## 2024-04-18 RX ORDER — SODIUM CHLORIDE, SODIUM LACTATE, POTASSIUM CHLORIDE, CALCIUM CHLORIDE 600; 310; 30; 20 MG/100ML; MG/100ML; MG/100ML; MG/100ML
125 INJECTION, SOLUTION INTRAVENOUS CONTINUOUS
Status: DISCONTINUED | OUTPATIENT
Start: 2024-04-18 | End: 2024-04-20

## 2024-04-18 RX ORDER — ONDANSETRON 2 MG/ML
4 INJECTION INTRAMUSCULAR; INTRAVENOUS EVERY 6 HOURS PRN
Status: DISCONTINUED | OUTPATIENT
Start: 2024-04-18 | End: 2024-04-20

## 2024-04-18 RX ORDER — NALBUPHINE HYDROCHLORIDE 10 MG/ML
10 INJECTION, SOLUTION INTRAMUSCULAR; INTRAVENOUS; SUBCUTANEOUS
Status: DISCONTINUED | OUTPATIENT
Start: 2024-04-18 | End: 2024-04-20

## 2024-04-18 RX ORDER — BUPIVACAINE HYDROCHLORIDE 2.5 MG/ML
30 INJECTION, SOLUTION EPIDURAL; INFILTRATION; INTRACAUDAL ONCE AS NEEDED
Status: DISCONTINUED | OUTPATIENT
Start: 2024-04-18 | End: 2024-04-20

## 2024-04-18 RX ADMIN — Medication 50 MCG: at 21:51

## 2024-04-18 NOTE — PROGRESS NOTES
"Routine Prenatal Visit  [facility] - [place]  [address], [place] [address]    Assessment/Plan:  Susana is a 33 y.o. year old  at 39w6d who presents for routine prenatal visit.     1. Pregnancy resulting from in vitro fertilization in third trimester  Assessment & Plan:  IOL scheduled for tonight      2. 39 weeks gestation of pregnancy  -     POCT urine dip    3. Hypothyroid in pregnancy, antepartum    4. Pyelectasis    5. Anxiety during pregnancy in third trimester, antepartum  Assessment & Plan:  Cont current meds            Subjective:   Susana Burns is a 33 y.o.  who presents for routine prenatal care at 39w6d.  Complaints today: Denies  LOF: -; VB: -; Contractions: -; FM: +    Objective:  /72 (BP Location: Right arm, Patient Position: Sitting, Cuff Size: Large)   Ht 5' 6\" (1.676 m)   Wt 107 kg (235 lb)   LMP  (LMP Unknown)   BMI 37.93 kg/m²     General: Well appearing, no distress  Respiratory: Unlabored breathing  Abdomen: Soft, gravid, nontender  Extremities: Warm and well perfused.  Non tender.    Pregravid Weight/BMI: 94.3 kg (208 lb) (BMI 33.59)  Current Weight: 107 kg (235 lb)   Total Weight Gain: 12.2 kg (27 lb)     Pre-Ericka Vitals      Flowsheet Row Most Recent Value   Prenatal Assessment    Fetal Heart Rate 143   Fundal Height (cm) 40 cm   Movement Present   Presentation Vertex   Prenatal Vitals    Blood Pressure 122/72   Weight - Scale 107 kg (235 lb)   Urine Albumin/Glucose    Dilation/Effacement/Station    Cervical Dilation 0   Cervical Effacement 0   Vaginal Drainage    Edema              Reese Stewart DO  2024 11:03 AM    "

## 2024-04-19 ENCOUNTER — ANESTHESIA EVENT (INPATIENT)
Dept: LABOR AND DELIVERY | Facility: HOSPITAL | Age: 34
End: 2024-04-19
Payer: COMMERCIAL

## 2024-04-19 ENCOUNTER — ANESTHESIA (INPATIENT)
Dept: ANESTHESIOLOGY | Facility: HOSPITAL | Age: 34
End: 2024-04-19
Payer: COMMERCIAL

## 2024-04-19 ENCOUNTER — ANESTHESIA EVENT (INPATIENT)
Dept: ANESTHESIOLOGY | Facility: HOSPITAL | Age: 34
End: 2024-04-19
Payer: COMMERCIAL

## 2024-04-19 LAB — TREPONEMA PALLIDUM IGG+IGM AB [PRESENCE] IN SERUM OR PLASMA BY IMMUNOASSAY: NORMAL

## 2024-04-19 PROCEDURE — NC001 PR NO CHARGE: Performed by: OBSTETRICS & GYNECOLOGY

## 2024-04-19 RX ORDER — LIDOCAINE HYDROCHLORIDE AND EPINEPHRINE 15; 5 MG/ML; UG/ML
INJECTION, SOLUTION EPIDURAL
Status: COMPLETED | OUTPATIENT
Start: 2024-04-19 | End: 2024-04-19

## 2024-04-19 RX ORDER — OXYTOCIN/RINGER'S LACTATE 30/500 ML
1-30 PLASTIC BAG, INJECTION (ML) INTRAVENOUS
Status: DISCONTINUED | OUTPATIENT
Start: 2024-04-19 | End: 2024-04-20

## 2024-04-19 RX ADMIN — SODIUM CHLORIDE, SODIUM LACTATE, POTASSIUM CHLORIDE, AND CALCIUM CHLORIDE 125 ML/HR: .6; .31; .03; .02 INJECTION, SOLUTION INTRAVENOUS at 20:26

## 2024-04-19 RX ADMIN — NALBUPHINE HYDROCHLORIDE 10 MG: 10 INJECTION, SOLUTION INTRAMUSCULAR; INTRAVENOUS; SUBCUTANEOUS at 12:16

## 2024-04-19 RX ADMIN — SODIUM CHLORIDE, SODIUM LACTATE, POTASSIUM CHLORIDE, AND CALCIUM CHLORIDE 125 ML/HR: .6; .31; .03; .02 INJECTION, SOLUTION INTRAVENOUS at 15:36

## 2024-04-19 RX ADMIN — ROPIVACAINE HYDROCHLORIDE: 2 INJECTION, SOLUTION EPIDURAL; INFILTRATION at 14:30

## 2024-04-19 RX ADMIN — SODIUM CHLORIDE, SODIUM LACTATE, POTASSIUM CHLORIDE, AND CALCIUM CHLORIDE 125 ML/HR: .6; .31; .03; .02 INJECTION, SOLUTION INTRAVENOUS at 00:03

## 2024-04-19 RX ADMIN — NALBUPHINE HYDROCHLORIDE 10 MG: 10 INJECTION, SOLUTION INTRAMUSCULAR; INTRAVENOUS; SUBCUTANEOUS at 08:06

## 2024-04-19 RX ADMIN — LIDOCAINE HYDROCHLORIDE,EPINEPHRINE BITARTRATE 5 ML: 15; .005 INJECTION, SOLUTION EPIDURAL; INFILTRATION; INTRACAUDAL; PERINEURAL at 14:29

## 2024-04-19 RX ADMIN — ROPIVACAINE HYDROCHLORIDE: 2 INJECTION, SOLUTION EPIDURAL; INFILTRATION at 22:00

## 2024-04-19 RX ADMIN — Medication 50 MCG: at 02:26

## 2024-04-19 RX ADMIN — Medication 2 MILLI-UNITS/MIN: at 09:24

## 2024-04-19 RX ADMIN — SODIUM CHLORIDE, SODIUM LACTATE, POTASSIUM CHLORIDE, AND CALCIUM CHLORIDE 125 ML/HR: .6; .31; .03; .02 INJECTION, SOLUTION INTRAVENOUS at 04:26

## 2024-04-19 RX ADMIN — SODIUM CHLORIDE, SODIUM LACTATE, POTASSIUM CHLORIDE, AND CALCIUM CHLORIDE 125 ML/HR: .6; .31; .03; .02 INJECTION, SOLUTION INTRAVENOUS at 12:21

## 2024-04-19 RX ADMIN — ONDANSETRON 4 MG: 2 INJECTION INTRAMUSCULAR; INTRAVENOUS at 15:02

## 2024-04-19 NOTE — OB LABOR/OXYTOCIN SAFETY PROGRESS
Labor Progress Note - Susana Burns 33 y.o. female MRN: [id number]    Unit/Bed#: -01 Encounter: [id number]    Dose (aruna-units/min) Oxytocin: 8 aruna-units/min  Contraction Frequency (minutes): 3-4  Contraction Intensity: Mild  Uterine Activity Characteristics: Regular  Cervical Dilation: 4        Cervical Effacement: 50  Fetal Station: -2  Baseline Rate (FHR): 120 bpm  Fetal Heart Rate (FHT): 150 BPM  FHR Category: I           Vital Signs:   Vitals:    04/19/24 0849   BP: 116/57   Pulse: 61   Resp: 18   Temp: (!) 97.3 °F (36.3 °C)   SpO2:        Notes/comments:   - Fetal and maternal status reassuring. AROM performed on exam. Clear fluid noted. Continue pitocin titration. Analgesia PRN.    Ibrahima Horne MD 4/19/2024 11:16 AM

## 2024-04-19 NOTE — OB LABOR/OXYTOCIN SAFETY PROGRESS
Labor Progress Note - Susana Burns 33 y.o. female MRN: [id number]    Unit/Bed#: -01 Encounter: [id number]       Contraction Frequency (minutes): n/a  Contraction Intensity: Mild  Uterine Activity Characteristics: Irregular  Cervical Dilation: Closed        Cervical Effacement: 0  Fetal Station: Ballotable  Baseline Rate (FHR): 135 bpm  Fetal Heart Rate (FHT): 150 BPM  FHR Category: 1           Vital Signs:   Vitals:    04/18/24 2036   BP: 129/64   Pulse: 74   Resp: 18   Temp: 97.9 °F (36.6 °C)   SpO2: 98%       Notes/comments:   Comfortable, not feeling CTX. SVE deferred. Will continue cyotec.     Arianne Raphael MD 4/19/2024 2:25 AM

## 2024-04-19 NOTE — ANESTHESIA PREPROCEDURE EVALUATION
Procedure:  CERV RIPEN W/ INTENT TO DELIV    Relevant Problems   ENDO   (+) Hypothyroid in pregnancy, antepartum   (+) Hypothyroidism      /RENAL   (+) Pyelectasis      GYN   (+) 38 weeks gestation of pregnancy      HEMATOLOGY   (+) Anemia affecting pregnancy in third trimester      NEURO/PSYCH   (+) Mild episode of recurrent major depressive disorder (HCC)        Physical Exam    Airway    Mallampati score: II         Dental   No notable dental hx     Cardiovascular  Rate: normal    Pulmonary   Breath sounds clear to auscultation    Other Findings  post-pubertal.      Anesthesia Plan  ASA Score- 2     Anesthesia Type- epidural with ASA Monitors.         Additional Monitors:     Airway Plan:            Plan Factors-    Chart reviewed.   Existing labs reviewed.     Patient is not a current smoker. Patient not instructed to abstain from smoking on day of procedure. Patient did not smoke on day of surgery.            Induction-     Postoperative Plan-     Informed Consent- Anesthetic plan and risks discussed with patient.  I personally reviewed this patient with the CRNA. Discussed and agreed on the Anesthesia Plan with the CRNA..

## 2024-04-19 NOTE — ANESTHESIA PREPROCEDURE EVALUATION
Procedure:  LABOR ANALGESIA    Relevant Problems   ENDO   (+) Hypothyroid in pregnancy, antepartum   (+) Hypothyroidism      /RENAL   (+) Pyelectasis      GYN   (+) 38 weeks gestation of pregnancy      HEMATOLOGY   (+) Anemia affecting pregnancy in third trimester      NEURO/PSYCH   (+) Mild episode of recurrent major depressive disorder (HCC)             Anesthesia Plan  ASA Score- 2     Anesthesia Type- epidural with ASA Monitors.         Additional Monitors:     Airway Plan:            Plan Factors-    Chart reviewed.   Existing labs reviewed. Patient summary reviewed.    Patient is not a current smoker.              Induction-     Postoperative Plan-     Informed Consent- Anesthetic plan and risks discussed with patient.  I personally reviewed this patient with the CRNA. Discussed and agreed on the Anesthesia Plan with the CRNA..

## 2024-04-19 NOTE — OB LABOR/OXYTOCIN SAFETY PROGRESS
Oxytocin Safety Progress Check Note - Susana Burns 33 y.o. female MRN: [id number]    Unit/Bed#: -01 Encounter: [id number]    Dose (aruna-units/min) Oxytocin: 16 aruna-units/min  Contraction Frequency (minutes): 2-6  Contraction Intensity: Moderate  Uterine Activity Characteristics: Irregular  Cervical Dilation: 5-6        Cervical Effacement: 70  Fetal Station: -1  Baseline Rate (FHR): 120 bpm  Fetal Heart Rate (FHT): 150 BPM  FHR Category: I           Vital Signs:   Vitals:    04/19/24 1643   BP: 123/65   Pulse: 57   Resp:    Temp:    SpO2:        Notes/comments:   - Patient comfortable following placement of epidural. Some change in effacement and station. Continue pitocin titration.       Ibrahima Horne MD 4/19/2024 4:52 PM

## 2024-04-19 NOTE — ANESTHESIA PROCEDURE NOTES
Epidural Block    Patient location during procedure: OB/L&D  Reason for block: procedure for pain, at surgeon's request and primary anesthetic  Staffing  Performed by: Cristina Mitchell CRNA  Authorized by: Neema Warren MD    Preanesthetic Checklist  Completed: patient identified, IV checked, site marked, risks and benefits discussed, surgical consent, monitors and equipment checked, pre-op evaluation and timeout performed  Epidural  Patient position: sitting  Prep: ChloraPrep  Sedation Level: no sedation  Patient monitoring: cardiac monitor, continuous pulse oximetry, frequent blood pressure checks and heart rate  Approach: midline  Location: lumbar, L3-4  Injection technique: LIUDMILA air and LIUDMILA saline  Needle  Needle type: Tuohy   Needle gauge: 18 G  Needle insertion depth: 8 cm  Catheter at skin depth: 15 cm  Catheter securement method: tape, stabilization device and clear occlusive dressing  Test dose: negativelidocaine-epinephrine (XYLOCAINE-MPF/EPINEPHRINE) 1.5 %-1:200,000 injection 3 mL - Epidural   5 mL - 4/19/2024 2:29:00 PM  Assessment  Number of attempts: 1negative aspiration for CSF, negative aspiration for heme and no paresthesia on injection  patient tolerated the procedure well with no immediate complications

## 2024-04-19 NOTE — PROGRESS NOTES
"Ob Labor Progress Note  Susana Burns 33 y.o. female MRN: [id number]  Unit/Bed#: -01 Encounter: [id number]    A/P.  33 y.o.  at 40w0d here for risk-reducing IoL.  (1) Induction.  Status-post Misoprostol 2 doses and FB.  FB now out and cervix now /Ballotable.     --> Oxytocin.  --> ARoM with descent.    (2) Fetal status category I.    --> Continuous fetal monitoring.    Benjamin Jamison MD  24  -------------------------------------------------------------------------------------    Subjective/    Comfortable.    Objective/  Blood pressure 116/57, pulse 61, temperature (!) 97.3 °F (36.3 °C), temperature source Temporal, resp. rate 18, height 5' 6\" (1.676 m), weight 107 kg (235 lb), SpO2 98%.    No intake or output data in the 24 hours ending 24 0916      Physical Exam/      General:  Alert, comfortable, NAD      Fundus nontender      Cervix:           / Ballotable,            Medium consistency / Posterior position      FHR: 110 moderate variability, no decels    Boody: q4\"      Labs/            Lab Results   Component Value Date    WBC 11.81 (H) 2024    HGB 11.7 2024    HCT 35.5 2024    MCV 92 2024     2024         "

## 2024-04-19 NOTE — OB LABOR/OXYTOCIN SAFETY PROGRESS
Labor Progress Note - Susana Burns 33 y.o. female MRN: [id number]    Unit/Bed#: -01 Encounter: [id number]       Contraction Frequency (minutes): 6  Contraction Intensity: Mild  Uterine Activity Characteristics: Irregular  Cervical Dilation: 1        Cervical Effacement: 0  Fetal Station: Ballotable  Baseline Rate (FHR): 115 bpm  Fetal Heart Rate (FHT): 150 BPM  FHR Category: 1           Vital Signs:   Vitals:    04/19/24 0308   BP: 101/55   Pulse: 59   Resp:    Temp:    SpO2:        Notes/comments:   She received 2 doses cytotec.   FB placed and inflated with 60cc sterile fluid.   Feeling crampy. CTX q 5 min.   May consider another dose of cytotec    Arianne Raphael MD 4/19/2024 6:13 AM

## 2024-04-19 NOTE — OB LABOR/OXYTOCIN SAFETY PROGRESS
Oxytocin Safety Progress Check Note - Susana Burns 33 y.o. female MRN: [id number]    Unit/Bed#: -01 Encounter: [id number]    Dose (aruna-units/min) Oxytocin: 24 aruna-units/min  Contraction Frequency (minutes): 2-5  Contraction Intensity: Moderate  Uterine Activity Characteristics: Irregular  Cervical Dilation: 7-8        Cervical Effacement: 90  Fetal Station: -1  Baseline Rate (FHR): 120 bpm  Fetal Heart Rate (FHT): 150 BPM  FHR Category: I           Vital Signs:   Vitals:    04/19/24 1912   BP: 113/58   Pulse: 67   Resp:    Temp:    SpO2:        Notes/comments:   - Fetal and maternal status reassuring. Continued progress in active phase of labor. Continue pitocin titration.       Ibrahima Horne MD 4/19/2024 7:53 PM

## 2024-04-19 NOTE — H&P
History & Physical - OB/GYN   Susana Burns 33 y.o. female MRN: [id number]  Unit/Bed#: -01 Encounter: [id number]    33 y.o. yo  at 39w6d weeks presents for IOL. IVF pregnancy.     Pregnancy Complications:  Patient Active Problem List   Diagnosis    Hypothyroidism    Mild episode of recurrent major depressive disorder (HCC)    Obesity (BMI 30.0-34.9)    PCOS (polycystic ovarian syndrome)    Hypothyroid in pregnancy, antepartum    Anxiety during pregnancy in third trimester, antepartum    38 weeks gestation of pregnancy    Pregnancy resulting from in vitro fertilization in third trimester    Anemia affecting pregnancy in third trimester    Pyelectasis       PMH:  Past Medical History:   Diagnosis Date    Hypothyroidism        PSH:  No past surgical history on file.    Social Hx:  Social History     Tobacco Use    Smoking status: Former     Current packs/day: 0.00     Types: Cigarettes     Quit date: 3/17/2022     Years since quittin.0     Passive exposure: Never    Smokeless tobacco: Never    Tobacco comments:     2 cig a day since age 13   Vaping Use    Vaping status: Never Used   Substance Use Topics    Alcohol use: Yes    Drug use: Never         OB Hx:  OB History    Para Term  AB Living   1 0 0 0 0 0   SAB IAB Ectopic Multiple Live Births   0 0 0 0 0      # Outcome Date GA Lbr Dave/2nd Weight Sex Delivery Anes PTL Lv   1 Current                Meds:  No current facility-administered medications on file prior to encounter.     Current Outpatient Medications on File Prior to Encounter   Medication Sig Dispense Refill    citalopram (CeleXA) 10 mg tablet Take 1 tablet (10 mg total) by mouth daily 90 tablet 0    Ferrous Sulfate ER (Slow Fe) 142 (45 Fe) MG TBCR Take by mouth      levothyroxine 75 mcg tablet Take 1 tablet 6 days a week and 2 tablets on . 60 tablet 0    Prenatal Vit-Fe Fumarate-FA (PRENATAL VITAMINS PO) Take 1 tablet by mouth daily         Allergies:  No Known  "Allergies    Labs:  Blood type: O+  Antibody: negative  Group B strep: negative  HIV: negative  Hepatitis B: negative  RPR: neg  Rubella: Immune  1 hour Glucose: 112    Physical Exam:  /64 (BP Location: Left arm)   Pulse 74   Temp 97.9 °F (36.6 °C) (Axillary)   Resp 18   Ht 5' 6\" (1.676 m)   Wt 107 kg (235 lb)   LMP  (LMP Unknown)   SpO2 98%   BMI 37.93 kg/m²         HEENT: atraumatic, normocephalic  Lungs: normal effort  Abdomen: gravid, non-tender, non-distended  Extremities: non-tender, no edema    Estimated Fetal Weight: 8.5 lbs  Presentation: vertex    SVE: 0 / 0% / -4  FHT:  130 / Moderate 6 - 25 bpm / +accels, no decels  Mayer: None    Membranes: intact    Assessment:   33 y.o.  at 39w6d weeks for IOL    Plan:   1. Admit to L&D  2. CBC, RPR, type and screen  3. Cervix not amenable to FB at this time. Will give cytotec and reassess.   4. GBS neg         "

## 2024-04-19 NOTE — OB LABOR/OXYTOCIN SAFETY PROGRESS
Oxytocin Safety Progress Check Note - Susana Burns 33 y.o. female MRN: [id number]    Unit/Bed#: -01 Encounter: [id number]    Dose (aruna-units/min) Oxytocin: 14 aruna-units/min  Contraction Frequency (minutes): 3-4  Contraction Intensity: Moderate  Uterine Activity Characteristics: Regular  Cervical Dilation: 5-6        Cervical Effacement: 50  Fetal Station: -2  Baseline Rate (FHR): 115 bpm  Fetal Heart Rate (FHT): 150 BPM  FHR Category: II           Vital Signs:   Vitals:    04/19/24 1237   BP:    Pulse:    Resp:    Temp: 97.9 °F (36.6 °C)   SpO2:        Notes/comments:   - FHR category II due to occasional, non-repetitive variable decelerations. Overall reassuring in the setting of moderate variability. Continue pitocin titration. Patient requesting epidural. Will consult anesthesia.       Ibrahima Horne MD 4/19/2024 1:57 PM

## 2024-04-20 PROBLEM — Z3A.40 40 WEEKS GESTATION OF PREGNANCY: Status: ACTIVE | Noted: 2023-11-15

## 2024-04-20 LAB
BASE EXCESS BLDCOA CALC-SCNC: -8.4 MMOL/L (ref 3–11)
BASE EXCESS BLDCOV CALC-SCNC: -5.5 MMOL/L (ref 1–9)
ERYTHROCYTE [DISTWIDTH] IN BLOOD BY AUTOMATED COUNT: 13.5 % (ref 11.6–15.1)
HCO3 BLDCOA-SCNC: 20.1 MMOL/L (ref 17.3–27.3)
HCO3 BLDCOV-SCNC: 19.3 MMOL/L (ref 12.2–28.6)
HCT VFR BLD AUTO: 32 % (ref 34.8–46.1)
HGB BLD-MCNC: 10.6 G/DL (ref 11.5–15.4)
MCH RBC QN AUTO: 30.7 PG (ref 26.8–34.3)
MCHC RBC AUTO-ENTMCNC: 33.1 G/DL (ref 31.4–37.4)
MCV RBC AUTO: 93 FL (ref 82–98)
O2 CT VFR BLDCOA CALC: 7.1 ML/DL
OXYHGB MFR BLDCOA: 27.3 %
OXYHGB MFR BLDCOV: 52 %
PCO2 BLDCOA: 52 MM[HG] (ref 30–60)
PCO2 BLDCOV: 36.6 MM HG (ref 27–43)
PH BLDCOA: 7.21 [PH] (ref 7.23–7.43)
PH BLDCOV: 7.34 [PH] (ref 7.19–7.49)
PLATELET # BLD AUTO: 259 THOUSANDS/UL (ref 149–390)
PMV BLD AUTO: 11.1 FL (ref 8.9–12.7)
PO2 BLDCOA: 16 MM HG (ref 5–25)
PO2 BLDCOV: 22.9 MM HG (ref 15–45)
RBC # BLD AUTO: 3.45 MILLION/UL (ref 3.81–5.12)
SAO2 % BLDCOV: 13.6 ML/DL
WBC # BLD AUTO: 35.1 THOUSAND/UL (ref 4.31–10.16)

## 2024-04-20 PROCEDURE — 85027 COMPLETE CBC AUTOMATED: CPT | Performed by: STUDENT IN AN ORGANIZED HEALTH CARE EDUCATION/TRAINING PROGRAM

## 2024-04-20 PROCEDURE — 82805 BLOOD GASES W/O2 SATURATION: CPT | Performed by: STUDENT IN AN ORGANIZED HEALTH CARE EDUCATION/TRAINING PROGRAM

## 2024-04-20 PROCEDURE — 3E033VJ INTRODUCTION OF OTHER HORMONE INTO PERIPHERAL VEIN, PERCUTANEOUS APPROACH: ICD-10-PCS | Performed by: STUDENT IN AN ORGANIZED HEALTH CARE EDUCATION/TRAINING PROGRAM

## 2024-04-20 PROCEDURE — 59400 OBSTETRICAL CARE: CPT | Performed by: STUDENT IN AN ORGANIZED HEALTH CARE EDUCATION/TRAINING PROGRAM

## 2024-04-20 RX ORDER — CALCIUM CARBONATE 500 MG/1
1000 TABLET, CHEWABLE ORAL DAILY PRN
Status: DISCONTINUED | OUTPATIENT
Start: 2024-04-20 | End: 2024-04-21 | Stop reason: HOSPADM

## 2024-04-20 RX ORDER — LEVOTHYROXINE SODIUM 0.07 MG/1
75 TABLET ORAL
Status: DISCONTINUED | OUTPATIENT
Start: 2024-04-20 | End: 2024-04-21 | Stop reason: HOSPADM

## 2024-04-20 RX ORDER — LEVOTHYROXINE SODIUM 0.07 MG/1
150 TABLET ORAL
Status: DISCONTINUED | OUTPATIENT
Start: 2024-04-21 | End: 2024-04-21 | Stop reason: HOSPADM

## 2024-04-20 RX ORDER — DOCUSATE SODIUM 100 MG/1
100 CAPSULE, LIQUID FILLED ORAL 2 TIMES DAILY
Status: DISCONTINUED | OUTPATIENT
Start: 2024-04-20 | End: 2024-04-21 | Stop reason: HOSPADM

## 2024-04-20 RX ORDER — CITALOPRAM 20 MG/1
10 TABLET ORAL DAILY
Status: DISCONTINUED | OUTPATIENT
Start: 2024-04-20 | End: 2024-04-21 | Stop reason: HOSPADM

## 2024-04-20 RX ORDER — IBUPROFEN 600 MG/1
600 TABLET ORAL EVERY 6 HOURS PRN
Status: DISCONTINUED | OUTPATIENT
Start: 2024-04-20 | End: 2024-04-21 | Stop reason: HOSPADM

## 2024-04-20 RX ORDER — BENZOCAINE/MENTHOL 6 MG-10 MG
1 LOZENGE MUCOUS MEMBRANE DAILY PRN
Status: DISCONTINUED | OUTPATIENT
Start: 2024-04-20 | End: 2024-04-21 | Stop reason: HOSPADM

## 2024-04-20 RX ORDER — DIPHENHYDRAMINE HCL 25 MG
25 TABLET ORAL EVERY 6 HOURS PRN
Status: DISCONTINUED | OUTPATIENT
Start: 2024-04-20 | End: 2024-04-21 | Stop reason: HOSPADM

## 2024-04-20 RX ORDER — ONDANSETRON 2 MG/ML
4 INJECTION INTRAMUSCULAR; INTRAVENOUS EVERY 8 HOURS PRN
Status: DISCONTINUED | OUTPATIENT
Start: 2024-04-20 | End: 2024-04-21 | Stop reason: HOSPADM

## 2024-04-20 RX ORDER — ACETAMINOPHEN 325 MG/1
650 TABLET ORAL EVERY 4 HOURS PRN
Status: DISCONTINUED | OUTPATIENT
Start: 2024-04-20 | End: 2024-04-21 | Stop reason: HOSPADM

## 2024-04-20 RX ADMIN — IBUPROFEN 600 MG: 600 TABLET, FILM COATED ORAL at 18:30

## 2024-04-20 RX ADMIN — IBUPROFEN 600 MG: 600 TABLET, FILM COATED ORAL at 07:24

## 2024-04-20 RX ADMIN — HYDROCORTISONE 1 APPLICATION: 1 CREAM TOPICAL at 07:52

## 2024-04-20 RX ADMIN — BENZOCAINE AND LEVOMENTHOL 1 APPLICATION: 200; 5 SPRAY TOPICAL at 07:52

## 2024-04-20 RX ADMIN — ACETAMINOPHEN 650 MG: 325 TABLET, FILM COATED ORAL at 16:18

## 2024-04-20 RX ADMIN — ACETAMINOPHEN 650 MG: 325 TABLET, FILM COATED ORAL at 11:08

## 2024-04-20 RX ADMIN — DOCUSATE SODIUM 100 MG: 100 CAPSULE, LIQUID FILLED ORAL at 08:36

## 2024-04-20 RX ADMIN — SODIUM CHLORIDE, SODIUM LACTATE, POTASSIUM CHLORIDE, AND CALCIUM CHLORIDE 125 ML/HR: .6; .31; .03; .02 INJECTION, SOLUTION INTRAVENOUS at 00:38

## 2024-04-20 RX ADMIN — CITALOPRAM 10 MG: 20 TABLET, FILM COATED ORAL at 08:36

## 2024-04-20 RX ADMIN — DOCUSATE SODIUM 100 MG: 100 CAPSULE, LIQUID FILLED ORAL at 17:43

## 2024-04-20 RX ADMIN — Medication 250 MILLI-UNITS/MIN: at 02:58

## 2024-04-20 RX ADMIN — WITCH HAZEL 1 PAD: 500 SOLUTION RECTAL; TOPICAL at 07:52

## 2024-04-20 RX ADMIN — IBUPROFEN 600 MG: 600 TABLET, FILM COATED ORAL at 13:30

## 2024-04-20 RX ADMIN — LEVOTHYROXINE SODIUM 75 MCG: 75 TABLET ORAL at 07:24

## 2024-04-20 NOTE — LACTATION NOTE
This note was copied from a baby's chart.  CONSULT - LACTATION  Baby Boy (Nasreen Burns 0 days male MRN: [id number]    [facility] NURSERY Room / Bed: (N)/(N) Encounter: [id number]    Maternal Information     MOTHER:  Susana Burns  Maternal Age: 33 y.o.   OB History: # 1 - Date: 24, Sex: Male, Weight: 3615 g (7 lb 15.5 oz), GA: 40w1d, Delivery: Vaginal, Spontaneous, Apgar1: 9, Apgar5: 9, Living: Living, Birth Comments: None   Previouse breast reduction surgery? No    Lactation history:   Has patient previously breast fed: No   How long had patient previously breast fed:     Previous breast feeding complications:     No past surgical history on file.     Birth information:  YOB: 2024   Time of birth: 2:46 AM   Sex: male   Delivery type: Vaginal, Spontaneous   Birth Weight: 3615 g (7 lb 15.5 oz)   Percent of Weight Change: 0%     Gestational Age: 40w1d   [unfilled]    Assessment     Breast and nipple assessment: normal assessment     Assessment: normal assessment    Feeding assessment: feeding well  LATCH:  Latch: Grasps breast, tongue down, lips flanged, rhythmic sucking   Audible Swallowing: None   Type of Nipple: Everted (After stimulation)   Comfort (Breast/Nipple): Soft/non-tender   Hold (Positioning): No assist from staff, mother able to position/hold infant   LATCH Score: 8          Feeding recommendations:  breast feed on demand    Met with mother and provided and reviewed Ready, Set, Baby and Discharge Booklet. Discussed importance of skin to skin and breastfeeding. Partner is able to participate in skin to skin as well. Discussed baby's feeding and sleeping patterns for the first and second day and what to expect. Suggested father participate in skin to skin especially during the 2nd day when baby is cluster feeding and wants to be held. Encouraged to avoid pacifier use and bottle use until 3-4 weeks of age to help  baby learn how to feed and establish feeding. Talked about Hunger and Fullness cues and how to catch early feeding cues. Briefly reviewed Breastfeeding log to keep track of feeding and baby's output. Talked about baby's stomach size and discussed expectations for urine count and stool in the first 24 hours and beyond. Discussed the importance of feeding on demand. To feed baby every 2-3 hours. Baby to feed at least 8 times in a 24 hour period.      Talked about benefits of hand expression and massage before feeding. Talked about first milk and the benefits of colostrum for baby's immune system. Reviewed supply and demand and that it typically takes 2-5 days for colostrum milk to transition. Went over steps for latching baby properly to avoid nipple pain and efficient stimulation and milk removal. Suggested mother give baby head support and sandwich her breast so that baby has and maintains a deep latch throughout feed. Assessed Latch while baby was in cradle. Discussed importance of positioning for mother and baby. Nose to nipple, mouth wide, flanged lips, double chin and lower jaw movement. Stomach to stomach, ear, shoulder and hip aligned. Mother was comfortable with sandwiching breat and positioning baby in cradle hold so that he stayed close during feeding.      Encouraged mother to use the breastfeeding log while in the hospital and also the one provided in Discharge booklet. Informed mother that the DC booklet goes over ways to prevent engorgement, plugged duct, plugged milk pore and mastitis and remedies, pumping, cleaning pump parts, milk storage, and paced bottle feeding.     Emphasized [facility] and encouraged to call to make an appointment for follow up post discharge, if needed.     All questions answered. Encouraged parents to call for any lactation assistance, questions or concerns during their stay.       Marina Kennedy RN 4/20/2024 10:45 AM

## 2024-04-20 NOTE — OB LABOR/OXYTOCIN SAFETY PROGRESS
Oxytocin Safety Progress Check Note - Susana Burns 33 y.o. female MRN: [id number]    Unit/Bed#: -01 Encounter: [id number]    Dose (aruna-units/min) Oxytocin: 28 aruna-units/min  Contraction Frequency (minutes): 2-6  Contraction Intensity: Moderate/Strong  Uterine Activity Characteristics: Regular  Cervical Dilation: 9        Cervical Effacement: 90  Fetal Station: 0  Baseline Rate (FHR): 135 bpm  Fetal Heart Rate (FHT): 140 BPM  FHR Category: II           Vital Signs:   Vitals:    24 2228   BP: 134/73   Pulse: 85   Resp:    Temp:    SpO2:        Notes/comments:   - Continued progress in active labor. FHR category II due to occasional shallow variable decelerations. Improved from prior. Overall reassuring based on moderate variability and present accelerations. Continue pitocin titration. Anticipate .       Ibrahima Horne MD 2024 11:37 PM

## 2024-04-20 NOTE — OB LABOR/OXYTOCIN SAFETY PROGRESS
Oxytocin Safety Progress Check Note - Susana Burns 33 y.o. female MRN: [id number]    Unit/Bed#: -01 Encounter: [id number]    Dose (aruna-units/min) Oxytocin: 26 aruna-units/min  Contraction Frequency (minutes): 2-6  Contraction Intensity: Moderate/Strong  Uterine Activity Characteristics: Irregular  Cervical Dilation: 7-8        Cervical Effacement: 90  Fetal Station: -1  Baseline Rate (FHR): 135 bpm  Fetal Heart Rate (FHT): 150 BPM  FHR Category: II           Vital Signs:   Vitals:    04/19/24 2027   BP: 107/55   Pulse: 60   Resp:    Temp:    SpO2:        Notes/comments:   - In for placement of IUPC and initiation of amnioinfusion in the setting of recurrent variable decelerations. Management recommendation discussed with patient. She is agreeable. IUPC placed without difficulty. Will administer 500 mL bolus of NS followed bu continuous infusion at 125 mL/hr.       Ibrahima Horne MD 4/19/2024 9:39 PM

## 2024-04-20 NOTE — ANESTHESIA POSTPROCEDURE EVALUATION
Post-Op Assessment Note    CV Status:  Stable    Pain management: adequate      Post-op block assessment: site cleaned, catheter intact and no complications   Mental Status:  Alert and awake   Hydration Status:  Euvolemic   PONV Controlled:  Controlled   Airway Patency:  Patent     Post Op Vitals Reviewed: Yes    No anethesia notable event occurred.    Staff: Anesthesiologist, CRNA               BP      Temp      Pulse     Resp      SpO2

## 2024-04-20 NOTE — OB LABOR/OXYTOCIN SAFETY PROGRESS
Oxytocin Safety Progress Check Note - Susana Burns 33 y.o. female MRN: [id number]    Unit/Bed#: -01 Encounter: [id number]    Dose (aruna-units/min) Oxytocin: 28 aruna-units/min  Contraction Frequency (minutes): 2-6  Contraction Intensity: Strong  Uterine Activity Characteristics: Regular  Cervical Dilation: 10  Dilation Complete Date: 04/20/24  Dilation Complete Time: 1218  Cervical Effacement: 100  Fetal Station: 0  Baseline Rate (FHR): 135 bpm  Fetal Heart Rate (FHT): 140 BPM  FHR Category: I           Vital Signs:   Vitals:    04/19/24 2228   BP: 134/73   Pulse: 85   Resp:    Temp:    SpO2:        Notes/comments:   - Fully dilated. Will begin pushing.       Ibrahima Horne MD 4/20/2024 12:21 AM

## 2024-04-20 NOTE — L&D DELIVERY NOTE
DELIVERY NOTE  Susana Burns 33 y.o. female MRN: [id number]  Unit/Bed#: -01 Encounter: [id number]    Obstetrician:  Ibrahima Horne MD    Pre-Delivery Diagnosis: [place] pregnancy in vertex presentation at 40w1d gestation.    Post-Delivery Diagnosis: Same, delivered    Procedure: Spontaneous vaginal delivery    Delivery Date and Time:  4/20/2024 at 02:46    Umbilical Artery  Recent Labs     04/20/24  0248   PHCART 7.205*   BECART -8.4*       Umbilical Vein  Recent Labs     04/20/24  0248   PHCVEN 7.341   BECVEN -5.5*       Apgars: 9 at 1 minute, 9 at 5 minutes    Weight: Pending           Complications: None    Description of Procedure:  Patient was found to be completely dilated and 0 station. She pushed for 2 hours and 22 minutes to spontaneously deliver a liveborn infant in direct occiput anterior position through clear fluid at 02:46. The head and shoulders delivered easily, with the body easily delivering thereafter. The infant was placed on maternal abdomen. Cord clamping was delayed for 30 seconds, after which the cord was doubly clamped and cut. Routine cord gases and cord blood were collected. Pitocin was started for active management of the 3rd stage. Placenta delivered spontaneously and was noted to have a centrally-inserted 3-vessel cord. The placenta was sent for storage. The fundus was noted to be firm. A thorough pelvic exam revealed a left sulcal laceration and 2nd degree perineal laceration. A rectal exam was performed and the rectal mucosa and anal sphincter complex were noted to be intact. Obstetric laceration was repaired with a combination of 2-0 and 3-0 polyglactin suture in typical fashion. Rectal exam was again performed and the rectum was noted to be free of suture. Excellent hemostasis was noted at that time. Total QBL of 1118 mL was attributed to bleeding from the patient's sulcal laceration. There was no significant atony. All needle, sponge, and instrument counts were  noted to be correct. The patient tolerated the procedure well and was allowed to recover in labor and delivery room.     Ibrahima Horne MD  4/20/2024 3:40 AM

## 2024-04-20 NOTE — OB LABOR/OXYTOCIN SAFETY PROGRESS
Oxytocin Safety Progress Check Note - Susana Burns 33 y.o. female MRN: [id number]    Unit/Bed#: -01 Encounter: [id number]    Dose (aruna-units/min) Oxytocin: 30 aruna-units/min  Contraction Frequency (minutes): 2-4  Contraction Intensity: Strong  Uterine Activity Characteristics: Regular  Cervical Dilation: 10  Dilation Complete Date: 04/20/24  Dilation Complete Time: 1218  Cervical Effacement: 100  Fetal Station: 3  Baseline Rate (FHR): 150 bpm  Fetal Heart Rate (FHT): 140 BPM  FHR Category: 2           Vital Signs:   Vitals:    04/20/24 0145   BP:    Pulse:    Resp:    Temp: 98.5 °F (36.9 °C)   SpO2:        Notes/comments:   - Patient has been pushing nearly 2 hours. Vertex at +3 station. Variable decelerations noted, but moderate variability present and patient progressing normally. Will continue. Anticipate delivery shortly.       Ibrahima Horne MD 4/20/2024 2:12 AM

## 2024-04-20 NOTE — OB LABOR/OXYTOCIN SAFETY PROGRESS
Oxytocin Safety Progress Check Note - Susana Burns 33 y.o. female MRN: [id number]    Unit/Bed#: -01 Encounter: [id number]    Dose (aruna-units/min) Oxytocin: 30 aruna-units/min  Contraction Frequency (minutes): 3-4  Contraction Intensity: Strong  Uterine Activity Characteristics: Regular  Cervical Dilation: 10  Dilation Complete Date: 24  Dilation Complete Time: 1218  Cervical Effacement: 100  Fetal Station: 1  Baseline Rate (FHR): 140 bpm  Fetal Heart Rate (FHT): 140 BPM  FHR Category: 2           Vital Signs:   Vitals:    24 0030   BP:    Pulse:    Resp:    Temp: 99 °F (37.2 °C)   SpO2:        Notes/comments:   - Patient has been pushing for 1 hour. Appropriate descent has been made from 0 to +1 station. FHR is category II due to variable decelerations with pushing, but overall reassuring in the second stage of labor based on moderate variability. Will continue pushing. Anticipate .       Ibrahima Horne MD 2024 1:21 AM

## 2024-04-20 NOTE — OB LABOR/OXYTOCIN SAFETY PROGRESS
Labor Progress Note - Susana Burns 33 y.o. female MRN: [id number]    Unit/Bed#: -01 Encounter: [id number]    Dose (aruna-units/min) Oxytocin: 26 aruna-units/min  Contraction Frequency (minutes): 4-5  Contraction Intensity: Moderate  Uterine Activity Characteristics: Regular  Cervical Dilation: 7-8        Cervical Effacement: 90  Fetal Station: -1  Baseline Rate (FHR): 135 bpm  Fetal Heart Rate (FHT): 150 BPM  FHR Category: II               Vital Signs:   Vitals:    04/19/24 2027   BP: 107/55   Pulse: 60   Resp:    Temp:    SpO2:        Notes/comments:   Recurrent variable decels.  Still normal baseline, moderate variability.  --> Continue labor.  --> Position change.  Consider further in-utero resuscitative measures if variables do not sammy.    Benjamin Jamison MD 4/19/2024 9:25 PM

## 2024-04-20 NOTE — PLAN OF CARE
Problem: Knowledge Deficit  Goal: Verbalizes understanding of labor plan  Description: Assess patient/family/caregiver's baseline knowledge level and ability to understand information.  Provide education via patient/family/caregiver's preferred learning method at appropriate level of understanding.     1. Provide teaching at level of understanding.  2. Provide teaching via preferred learning method(s).  Outcome: Completed  Goal: Patient/family/caregiver demonstrates understanding of disease process, treatment plan, medications, and discharge instructions  Description: Complete learning assessment and assess knowledge base.  Interventions:  - Provide teaching at level of understanding  - Provide teaching via preferred learning methods  Outcome: Completed     Problem: Labor & Delivery  Goal: Manages discomfort  Description: Assess and monitor for signs and symptoms of discomfort.  Assess patient's pain level regularly and per hospital policy.  Administer medications as ordered. Support use of nonpharmacological methods to help control pain such as distraction, imagery, relaxation, and application of heat and cold.  Collaborate with interdisciplinary team and patient to determine appropriate pain management plan.    1. Include patient in decisions related to comfort.  2. Offer non-pharmacological pain management interventions.  3. Report ineffective pain management to physician.  Outcome: Completed  Goal: Patient vital signs are stable  Description: 1. Assess vital signs - vaginal delivery.  Outcome: Completed     Problem: PAIN - ADULT  Goal: Verbalizes/displays adequate comfort level or baseline comfort level  Description: Interventions:  - Encourage patient to monitor pain and request assistance  - Assess pain using appropriate pain scale  - Administer analgesics based on type and severity of pain and evaluate response  - Implement non-pharmacological measures as appropriate and evaluate response  - Consider  cultural and social influences on pain and pain management  - Notify physician/advanced practitioner if interventions unsuccessful or patient reports new pain  Outcome: Progressing     Problem: INFECTION - ADULT  Goal: Absence or prevention of progression during hospitalization  Description: INTERVENTIONS:  - Assess and monitor for signs and symptoms of infection  - Monitor lab/diagnostic results  - Monitor all insertion sites, i.e. indwelling lines, tubes, and drains  - Monitor endotracheal if appropriate and nasal secretions for changes in amount and color  - [place] appropriate cooling/warming therapies per order  - Administer medications as ordered  - Instruct and encourage patient and family to use good hand hygiene technique  - Identify and instruct in appropriate isolation precautions for identified infection/condition  Outcome: Progressing  Goal: Absence of fever/infection during neutropenic period  Description: INTERVENTIONS:  - Monitor WBC    Outcome: Progressing     Problem: SAFETY ADULT  Goal: Patient will remain free of falls  Description: INTERVENTIONS:  - Educate patient/family on patient safety including physical limitations  - Instruct patient to call for assistance with activity   - Consult OT/PT to assist with strengthening/mobility   - Keep Call bell within reach  - Keep bed low and locked with side rails adjusted as appropriate  - Keep care items and personal belongings within reach  - Initiate and maintain comfort rounds    Outcome: Progressing  Goal: Maintain or return to baseline ADL function  Description: INTERVENTIONS:  -  Assess patient's ability to carry out ADLs; assess patient's baseline for ADL function and identify physical deficits which impact ability to perform ADLs (bathing, care of mouth/teeth, toileting, grooming, dressing, etc.)  - Assess/evaluate cause of self-care deficits   - Assess range of motion  - Assess patient's mobility; develop plan if impaired  - Assess patient's  need for assistive devices and provide as appropriate  - Encourage maximum independence but intervene and supervise when necessary  - Involve family in performance of ADLs  - Assess for home care needs following discharge   - Consider OT consult to assist with ADL evaluation and planning for discharge  - Provide patient education as appropriate  Outcome: Progressing  Goal: Maintains/Returns to pre admission functional level  Description: INTERVENTIONS:  - Perform AM-PAC 6 Click Basic Mobility/ Daily Activity assessment daily.  - Set and communicate daily mobility goal to care team and patient/family/caregiver.     Outcome: Progressing     Problem: DISCHARGE PLANNING  Goal: Discharge to home or other facility with appropriate resources  Description: INTERVENTIONS:  - Identify barriers to discharge w/patient and caregiver  - Arrange for needed discharge resources and transportation as appropriate  - Identify discharge learning needs (meds, wound care, etc.)  - Arrange for interpretive services to assist at discharge as needed  - Refer to Case Management Department for coordinating discharge planning if the patient needs post-hospital services based on physician/advanced practitioner order or complex needs related to functional status, cognitive ability, or social support system  Outcome: Progressing

## 2024-04-21 VITALS
SYSTOLIC BLOOD PRESSURE: 135 MMHG | HEART RATE: 79 BPM | BODY MASS INDEX: 37.77 KG/M2 | WEIGHT: 235 LBS | RESPIRATION RATE: 18 BRPM | HEIGHT: 66 IN | DIASTOLIC BLOOD PRESSURE: 81 MMHG | TEMPERATURE: 98.4 F | OXYGEN SATURATION: 98 %

## 2024-04-21 LAB
ERYTHROCYTE [DISTWIDTH] IN BLOOD BY AUTOMATED COUNT: 13.5 % (ref 11.6–15.1)
HCT VFR BLD AUTO: 25.5 % (ref 34.8–46.1)
HGB BLD-MCNC: 8.3 G/DL (ref 11.5–15.4)
MCH RBC QN AUTO: 30.3 PG (ref 26.8–34.3)
MCHC RBC AUTO-ENTMCNC: 32.5 G/DL (ref 31.4–37.4)
MCV RBC AUTO: 93 FL (ref 82–98)
PLATELET # BLD AUTO: 195 THOUSANDS/UL (ref 149–390)
PMV BLD AUTO: 11 FL (ref 8.9–12.7)
RBC # BLD AUTO: 2.74 MILLION/UL (ref 3.81–5.12)
WBC # BLD AUTO: 19.76 THOUSAND/UL (ref 4.31–10.16)

## 2024-04-21 PROCEDURE — NC001 PR NO CHARGE: Performed by: OBSTETRICS & GYNECOLOGY

## 2024-04-21 PROCEDURE — 85027 COMPLETE CBC AUTOMATED: CPT | Performed by: STUDENT IN AN ORGANIZED HEALTH CARE EDUCATION/TRAINING PROGRAM

## 2024-04-21 PROCEDURE — 99024 POSTOP FOLLOW-UP VISIT: CPT | Performed by: OBSTETRICS & GYNECOLOGY

## 2024-04-21 RX ORDER — IBUPROFEN 600 MG/1
600 TABLET ORAL EVERY 6 HOURS PRN
Qty: 30 TABLET | Refills: 0 | Status: SHIPPED | OUTPATIENT
Start: 2024-04-21

## 2024-04-21 RX ORDER — DOCUSATE SODIUM 100 MG/1
100 CAPSULE, LIQUID FILLED ORAL 2 TIMES DAILY PRN
Qty: 30 CAPSULE | Refills: 0 | Status: SHIPPED | OUTPATIENT
Start: 2024-04-21

## 2024-04-21 RX ORDER — FERROUS SULFATE 324(65)MG
324 TABLET, DELAYED RELEASE (ENTERIC COATED) ORAL
Qty: 60 TABLET | Refills: 0 | Status: SHIPPED | OUTPATIENT
Start: 2024-04-21

## 2024-04-21 RX ADMIN — ACETAMINOPHEN 650 MG: 325 TABLET, FILM COATED ORAL at 08:22

## 2024-04-21 RX ADMIN — CITALOPRAM 10 MG: 20 TABLET, FILM COATED ORAL at 08:29

## 2024-04-21 RX ADMIN — LEVOTHYROXINE SODIUM 150 MCG: 75 TABLET ORAL at 06:26

## 2024-04-21 RX ADMIN — DOCUSATE SODIUM 100 MG: 100 CAPSULE, LIQUID FILLED ORAL at 08:23

## 2024-04-21 RX ADMIN — IBUPROFEN 600 MG: 600 TABLET, FILM COATED ORAL at 01:04

## 2024-04-21 RX ADMIN — IBUPROFEN 600 MG: 600 TABLET, FILM COATED ORAL at 06:26

## 2024-04-21 NOTE — DISCHARGE SUMMARY
Discharge Summary  Susana Burns 33 y.o. female MRN: [id number]  Unit/Bed#: -01 Encounter: [id number]    Admission Date: 2024  Discharge Date: 24  Attending: No att. providers found    Admission Diagnosis:    (1) 33 y.o.  with pregnancy at 39w6d    Discharge Diagnosis:    (1) 33 y.o.  status-post Vaginal, Spontaneous  on 2024     (2) 3615 g (7 lb 15.5 oz)  male  , apgars 9  / 9     Procedures:    Delivery (Vaginal, Spontaneous ) 2024  2:46 AM     Hospital Course:    Susana Burns is a 33 y.o. now  admitted 2024 at 39.6 for risk-reducing IoL.    Induction was started with misoprostol followed by Weber balloon and then oxytocin.  She progressed along her labor curve and delivered a viable 3615 g (7 lb 15.5 oz)  male   on 2024  via Vaginal, Spontaneous .  There was an EBL of 1118 at delivery, but her bleeding resolved after this.    She underwent normal post delivery care and recovered well.    On day of discharge 24, she is ambulating, voiding on her own, passing flatus, and tolerating oral intake.  She has appropriate lochia and mild cramps.  She has mild pain that is well controlled with only oral analgesics.    Her Hgb had fallen to 8.3, but she was asymptomatic with minimal vaginal bleeding.  She is discharged home on oral iron.    She will follow up in 3 weeks for routine postpartum visit..    Complications:    None    Condition at discharge:    good    Discharge Medications:    For a complete list of the patient's medications, please refer to her medical reconcillation report.    Discharge instructions/Information to patient and family:    See after visit summary for information provided to patient and family.      Provisions for Follow-Up Care:    See after visit summary for information related to follow-up care and any pertinent home health orders.      Disposition:    See After Visit Summary for discharge disposition  information.    Planned Readmission:    No    Benjamin Jamison MD  04/21/24

## 2024-04-21 NOTE — PROGRESS NOTES
Update:     Hgb this mornin.7 -> 10.6 -> 8.3    Discussed with her.  Minimal ongoing bleeding.  No dizziness/lightheadedness.    IV already out.  Will use oral iron..    --> Home on oral iron.  --> Precautions discussed; early presentation if heavy bleeding.    Benjamin Jamison MD

## 2024-04-21 NOTE — PROGRESS NOTES
"Ob Postpartum Note  Susana Burns 33 y.o. female MRN: [id number]  Unit/Bed#: -01 Encounter: [id number]    A/P.  33 y.o.  PPD#1 s/p delivery (Vaginal, Spontaneous ) at 40w1d of 3615 g (7 lb 15.5 oz)  male  , apgars 9 /9 .  (1) PPD#1.  Delivered 2024  2:46 AM .  Doing well.  Would very much like to go home.  --> Discharge home.    (2) Hypothyroid.  --> Continue outpatient dose levothyroxine.    (3) Anxiety.  --> Continue Celexa.    Benjamin Jamison MD  24  ---------------------------------------------------------------------------------------------    Subjective/    Feels well.  Tolerating po, ambulating, voiding without difficulty.  Happy.  Breastfeeding/bonding.  Mild cramps, appropriate lochia.    Objective/  Blood pressure 111/78, pulse 82, temperature 98.4 °F (36.9 °C), temperature source Oral, resp. rate 18, height 5' 6\" (1.676 m), weight 107 kg (235 lb), SpO2 100%, currently breastfeeding.    Patient Vitals for the past 24 hrs:   BP Temp Temp src Pulse Resp SpO2   24 0804 135/81 98.4 °F (36.9 °C) Oral 79 18 98 %   24 0313 111/78 98.4 °F (36.9 °C) Oral 82 18 100 %   24 0004 121/69 98.2 °F (36.8 °C) Oral 76 18 100 %   24 2000 119/70 98.4 °F (36.9 °C) Oral 83 16 --   24 1500 116/70 98.2 °F (36.8 °C) Oral 92 16 98 %   24 1108 115/72 98.5 °F (36.9 °C) Oral 89 18 97 %       Intake/Output Summary (Last 24 hours) at 2024 0804  Last data filed at 2024 1301  Gross per 24 hour   Intake --   Output 800 ml   Net -800 ml         Physical Exam/      General:  Alert, comfortable, NAD      Cardiovascular: Regular rate and rhythm      Respiratory: Clear to auscultation bilaterally.      Abdomen: Soft, non-tender, non-distended      Fundus: Firm, below umbilicus, nontender      Extremities: Warm, non-tender      Labs/      Blood type:  O+/-      Rubella: Immune      Lab Results   Component Value Date    WBC 35.10 (H) 2024    HGB 10.6 (L) " 04/20/2024    HCT 32.0 (L) 04/20/2024    MCV 93 04/20/2024     04/20/2024

## 2024-04-21 NOTE — PLAN OF CARE
Problem: PAIN - ADULT  Goal: Verbalizes/displays adequate comfort level or baseline comfort level  Description: Interventions:  - Encourage patient to monitor pain and request assistance  - Assess pain using appropriate pain scale  - Administer analgesics based on type and severity of pain and evaluate response  - Implement non-pharmacological measures as appropriate and evaluate response  - Consider cultural and social influences on pain and pain management  - Notify physician/advanced practitioner if interventions unsuccessful or patient reports new pain  Outcome: Completed     Problem: INFECTION - ADULT  Goal: Absence or prevention of progression during hospitalization  Description: INTERVENTIONS:  - Assess and monitor for signs and symptoms of infection  - Monitor lab/diagnostic results  - Monitor all insertion sites, i.e. indwelling lines, tubes, and drains  - Monitor endotracheal if appropriate and nasal secretions for changes in amount and color  - Eldridge appropriate cooling/warming therapies per order  - Administer medications as ordered  - Instruct and encourage patient and family to use good hand hygiene technique  - Identify and instruct in appropriate isolation precautions for identified infection/condition  Outcome: Completed  Goal: Absence of fever/infection during neutropenic period  Description: INTERVENTIONS:  - Monitor WBC    Outcome: Completed     Problem: SAFETY ADULT  Goal: Patient will remain free of falls  Description: INTERVENTIONS:  - Educate patient/family on patient safety including physical limitations  - Instruct patient to call for assistance with activity   - Consult OT/PT to assist with strengthening/mobility   - Keep Call bell within reach  - Keep bed low and locked with side rails adjusted as appropriate  - Keep care items and personal belongings within reach  - Initiate and maintain comfort rounds  - Make Fall Risk Sign visible to staff    - Apply yellow socks and bracelet for  high fall risk patients  - Consider moving patient to room near nurses station  Outcome: Completed  Goal: Maintain or return to baseline ADL function  Description: INTERVENTIONS:  -  Assess patient's ability to carry out ADLs; assess patient's baseline for ADL function and identify physical deficits which impact ability to perform ADLs (bathing, care of mouth/teeth, toileting, grooming, dressing, etc.)  - Assess/evaluate cause of self-care deficits   - Assess range of motion  - Assess patient's mobility; develop plan if impaired  - Assess patient's need for assistive devices and provide as appropriate  - Encourage maximum independence but intervene and supervise when necessary  - Involve family in performance of ADLs  - Assess for home care needs following discharge   - Consider OT consult to assist with ADL evaluation and planning for discharge  - Provide patient education as appropriate  Outcome: Completed  Goal: Maintains/Returns to pre admission functional level  Description: INTERVENTIONS:  - Perform AM-PAC 6 Click Basic Mobility/ Daily Activity assessment daily.  - Set and communicate daily mobility goal to care team and patient/family/caregiver.   - Collaborate with rehabilitation services on mobility goals if consulted    - Out of bed for toileting  - Record patient progress and toleration of activity level   Outcome: Completed     Problem: DISCHARGE PLANNING  Goal: Discharge to home or other facility with appropriate resources  Description: INTERVENTIONS:  - Identify barriers to discharge w/patient and caregiver  - Arrange for needed discharge resources and transportation as appropriate  - Identify discharge learning needs (meds, wound care, etc.)  - Arrange for interpretive services to assist at discharge as needed  - Refer to Case Management Department for coordinating discharge planning if the patient needs post-hospital services based on physician/advanced practitioner order or complex needs related to  functional status, cognitive ability, or social support system  Outcome: Completed

## 2024-04-25 ENCOUNTER — TELEPHONE (OUTPATIENT)
Dept: OBGYN CLINIC | Facility: CLINIC | Age: 34
End: 2024-04-25

## 2024-04-25 DIAGNOSIS — E03.9 HYPOTHYROID IN PREGNANCY, ANTEPARTUM: Primary | ICD-10-CM

## 2024-04-25 DIAGNOSIS — N13.30 PYELECTASIS: ICD-10-CM

## 2024-04-25 DIAGNOSIS — F41.9 ANXIETY DURING PREGNANCY IN THIRD TRIMESTER, ANTEPARTUM: ICD-10-CM

## 2024-04-25 DIAGNOSIS — O99.343 ANXIETY DURING PREGNANCY IN THIRD TRIMESTER, ANTEPARTUM: ICD-10-CM

## 2024-04-25 DIAGNOSIS — O99.013 ANEMIA AFFECTING PREGNANCY IN THIRD TRIMESTER: ICD-10-CM

## 2024-04-25 DIAGNOSIS — Z3A.40 40 WEEKS GESTATION OF PREGNANCY: ICD-10-CM

## 2024-04-25 DIAGNOSIS — O09.813 PREGNANCY RESULTING FROM IN VITRO FERTILIZATION IN THIRD TRIMESTER: ICD-10-CM

## 2024-04-25 DIAGNOSIS — O99.280 HYPOTHYROID IN PREGNANCY, ANTEPARTUM: Primary | ICD-10-CM

## 2024-04-25 NOTE — TELEPHONE ENCOUNTER
"POSTPARTUM PHONE CALL ASSESSMENT    Date of Delivery: 24  Delivering Provider: Ozzie   Mode:   Delivery Notes/Complications:    Do you still have bleeding/pain? If so, how much/how severe? Minimal bleeding, changing every 2 hours for hygiene.   Regular BMs/Urination? Yes   Breastfeeding/Formula/Both? Breast feeding   How are you doing emotionally?   \" Feeling good\"   EPDS Score: 1  Do you have any other questions or concerns for us or your provider? Not  at this time   Have you scheduled the pediatrician appointment with pediatrician? Yes,  Do you have a postpartum visit scheduled? yes   Date scheduled: 24  Provider: Ozzie"

## 2024-04-26 DIAGNOSIS — F33.0 MILD EPISODE OF RECURRENT MAJOR DEPRESSIVE DISORDER (HCC): ICD-10-CM

## 2024-04-27 RX ORDER — CITALOPRAM HYDROBROMIDE 10 MG/1
10 TABLET ORAL DAILY
Qty: 90 TABLET | Refills: 1 | Status: SHIPPED | OUTPATIENT
Start: 2024-04-27

## 2024-04-28 LAB — PLACENTA IN STORAGE: NORMAL

## 2024-05-13 ENCOUNTER — POSTPARTUM VISIT (OUTPATIENT)
Dept: OBGYN CLINIC | Facility: CLINIC | Age: 34
End: 2024-05-13

## 2024-05-13 VITALS
WEIGHT: 212.6 LBS | SYSTOLIC BLOOD PRESSURE: 112 MMHG | HEIGHT: 66 IN | DIASTOLIC BLOOD PRESSURE: 72 MMHG | BODY MASS INDEX: 34.17 KG/M2

## 2024-05-13 PROCEDURE — 99024 POSTOP FOLLOW-UP VISIT: CPT | Performed by: STUDENT IN AN ORGANIZED HEALTH CARE EDUCATION/TRAINING PROGRAM

## 2024-05-13 NOTE — PROGRESS NOTES
"[facility] - [place]  [address] [place] [address]    Assessment/Plan:  Susana is a 33 y.o. year old  who presents for postpartum visit.    Routine Postpartum Care  Normal postpartum exam  Contraception: none  Depression Screen: Low risk  Feeding: Breast  Cervical cancer screening Up to Date, next due 2026  Follow up in: 3 months for annual exam or as needed.    Additional Problems:  1. Postpartum examination following vaginal delivery        Subjective:     CC: Postpartum visit    Susana Burns is a 33 y.o. y.o. female  who presents for a postpartum visit.     She is 3 weeks postpartum following a vaginal delivery on  at 40 weeks. Postpartum course has been uncomplicated.     Bleeding thin lochia. Bowel function is normal. Bladder function is normal. Patient is not sexually active.     Postpartum Depression: Low Risk  (2024)    [place]  Depression Scale     Last EPDS Total Score: 0     Last EPDS Self Harm Result: Never       The following portions of the patient's history were reviewed and updated as appropriate: allergies, current medications, past family history, past medical history, obstetric history, gynecologic history, past social history, past surgical history and problem list.    Objective:  /72 (BP Location: Right arm, Patient Position: Sitting, Cuff Size: Standard)   Ht 5' 6\" (1.676 m)   Wt 96.4 kg (212 lb 9.6 oz)   LMP  (LMP Unknown)   Breastfeeding Yes   BMI 34.31 kg/m²   Pregravid Weight/BMI: 94.3 kg (208 lb) (BMI 33.59)  Current Weight: 96.4 kg (212 lb 9.6 oz)   Total Weight Gain: 12.2 kg (27 lb)   Pre-Ericka Vitals      Flowsheet Row Most Recent Value   Prenatal Assessment    Prenatal Vitals    Blood Pressure 112/72   Weight - Scale 96.4 kg (212 lb 9.6 oz)   Urine Albumin/Glucose    Dilation/Effacement/Station    Vaginal Drainage    Edema              Chaperone present? Yes: Nahun Jack MA.    General Appearance: alert and " oriented, in no acute distress.   Abdomen: Soft, non-tender, non-distended, no masses, no rebound or guarding.  Pelvic:       External genitalia: Normal appearance, no abnormal pigmentation, no lesions or masses. Normal Bartholin's and [place]'s. Obstetric laceration well-healing.       Urinary system: Urethral meatus normal, bladder non-tender.      Vaginal: normal mucosa without prolapse or lesions. Normal-appearing physiologic discharge.      Cervix: Normal-appearing, well-epithelialized, no gross lesions or masses.    Extremities: Normal range of motion.   Skin: normal, no rash or abnormalities  Neurologic: alert, oriented x3  Psychiatric: Appropriate affect, mood stable, cooperative with exam.        Ibrahima Horne MD  5/13/2024 10:09 AM

## 2024-06-25 ENCOUNTER — OFFICE VISIT (OUTPATIENT)
Dept: FAMILY MEDICINE CLINIC | Facility: HOSPITAL | Age: 34
End: 2024-06-25
Payer: COMMERCIAL

## 2024-06-25 VITALS
OXYGEN SATURATION: 98 % | WEIGHT: 222.6 LBS | BODY MASS INDEX: 35.77 KG/M2 | HEART RATE: 68 BPM | SYSTOLIC BLOOD PRESSURE: 110 MMHG | HEIGHT: 66 IN | DIASTOLIC BLOOD PRESSURE: 70 MMHG | TEMPERATURE: 97.8 F

## 2024-06-25 DIAGNOSIS — M25.532 ACUTE PAIN OF LEFT WRIST: Primary | ICD-10-CM

## 2024-06-25 PROBLEM — F41.9 ANXIETY DURING PREGNANCY IN THIRD TRIMESTER, ANTEPARTUM: Status: RESOLVED | Noted: 2023-10-10 | Resolved: 2024-06-25

## 2024-06-25 PROBLEM — Z3A.40 40 WEEKS GESTATION OF PREGNANCY: Status: RESOLVED | Noted: 2023-11-15 | Resolved: 2024-06-25

## 2024-06-25 PROBLEM — O99.013 ANEMIA AFFECTING PREGNANCY IN THIRD TRIMESTER: Status: RESOLVED | Noted: 2024-01-30 | Resolved: 2024-06-25

## 2024-06-25 PROBLEM — O99.343 ANXIETY DURING PREGNANCY IN THIRD TRIMESTER, ANTEPARTUM: Status: RESOLVED | Noted: 2023-10-10 | Resolved: 2024-06-25

## 2024-06-25 PROCEDURE — 99213 OFFICE O/P EST LOW 20 MIN: CPT | Performed by: NURSE PRACTITIONER

## 2024-06-25 NOTE — PROGRESS NOTES
Ambulatory Visit  Name: Susana Burns      : 1990      MRN: [id number]  Encounter Provider: TORO Cagle  Encounter Date: 2024   Encounter department: [facility] PRIMARY [facility] [address]     Assessment & Plan   1. Acute pain of left wrist  Comments:  probable tendonitis, nursing infant so advise she hold NSAID use & advise tylenol prn instead; recommend ice few times/day, consult PT for further eval/treat  Orders:  -     Ambulatory Referral to Physical Therapy; Future         History of Present Illness       Has had left wrist pain for a few weeks. She is right handed.    She nursing a 2 month old so not taking any meds.         Review of Systems   Musculoskeletal:  Positive for arthralgias (left wrist x2 weeks, no injury but carries infant w/this hand, no meds taken (breastfeeding)). Negative for joint swelling.       Past Medical History:   Diagnosis Date    40 weeks gestation of pregnancy 11/15/2023    Prenatal labs scanned - completed 2023 with [facility]      Anemia affecting pregnancy in third trimester 2024    Mild anemia (Hgb 10.7) noted at 28 weeks.   [x] Start oral iron supplementation - [employer] message sent 2024.     Repeat CBC and ferritin at 36 weeks shows resolved anemia (Hgb 11.6, ferritin 30). Continue oral iron supplementation.      Anxiety during pregnancy in third trimester, antepartum 10/10/2023    On citalopram 10 mg      Hypothyroidism      History reviewed. No pertinent surgical history.  Family History   Problem Relation Age of Onset    Thyroid disease unspecified Mother     Heart disease Mother     Stroke Mother     Hyperthyroidism Mother     Diabetes Brother     No Known Problems Father     Colon cancer Cousin      Social History     Tobacco Use    Smoking status: Former     Current packs/day: 0.00     Types: Cigarettes     Quit date: 3/17/2022     Years since quittin.2     Passive exposure: Never    Smokeless tobacco: Never    Tobacco  "comments:     2 cig a day since age 13   Vaping Use    Vaping status: Never Used   Substance and Sexual Activity    Alcohol use: Yes    Drug use: Never    Sexual activity: Yes     Partners: Male     Birth control/protection: None     Current Outpatient Medications on File Prior to Visit   Medication Sig    citalopram (CeleXA) 10 mg tablet Take 1 tablet (10 mg total) by mouth daily    levothyroxine 75 mcg tablet Take 1 tablet 6 days a week and 2 tablets on Sunday.     No Known Allergies  Immunization History   Administered Date(s) Administered    HPV Quadrivalent 04/22/2011    Hep B, Adolescent or Pediatric 12/10/2002, 02/10/2003, 04/10/2003    INFLUENZA 10/26/2021, 11/16/2022    Influenza Injectable, MDCK, Preservative Free, Quadrivalent, 0.5 mL 10/26/2021    MMR 06/07/2022, 06/07/2022, 06/23/2022, 06/23/2022    Tdap 02/20/2024    Tuberculin Skin Test 08/01/2022, 11/16/2022    Tuberculin Skin Test-PPD Intradermal 10/26/2021     Objective     /70   Pulse 68   Temp 97.8 °F (36.6 °C)   Ht 5' 6\" (1.676 m)   Wt 101 kg (222 lb 9.6 oz)   SpO2 98%   BMI 35.93 kg/m²       Physical Exam  Vitals reviewed.   Constitutional:       General: She is not in acute distress.     Appearance: Normal appearance.   HENT:      Head: Normocephalic.   Pulmonary:      Effort: Pulmonary effort is normal. No respiratory distress.   Musculoskeletal:      Left wrist: Tenderness (radial) present. No swelling, deformity, effusion or crepitus. Normal range of motion.      Comments: Left hand  strength +5/5   Skin:     General: Skin is warm and dry.   Neurological:      General: No focal deficit present.      Mental Status: She is alert and oriented to person, place, and time.      Sensory: No sensory deficit.   Psychiatric:         Mood and Affect: Mood normal.         Behavior: Behavior normal.         Administrative Statements   I have spent a total time of 10 minutes on 06/25/24 In caring for this patient including Instructions " for management, Impressions, Counseling / Coordination of care, Documenting in the medical record, Reviewing / ordering tests, medicine, procedures  , and Obtaining or reviewing history  .

## 2024-06-30 DIAGNOSIS — F33.0 MILD EPISODE OF RECURRENT MAJOR DEPRESSIVE DISORDER (HCC): ICD-10-CM

## 2024-06-30 DIAGNOSIS — E03.9 ACQUIRED HYPOTHYROIDISM: ICD-10-CM

## 2024-06-30 RX ORDER — LEVOTHYROXINE SODIUM 0.07 MG/1
TABLET ORAL
Qty: 60 TABLET | Refills: 1 | Status: SHIPPED | OUTPATIENT
Start: 2024-06-30

## 2024-06-30 RX ORDER — CITALOPRAM HYDROBROMIDE 10 MG/1
10 TABLET ORAL DAILY
Qty: 90 TABLET | Refills: 1 | Status: SHIPPED | OUTPATIENT
Start: 2024-06-30

## 2024-07-09 ENCOUNTER — EVALUATION (OUTPATIENT)
Dept: OCCUPATIONAL THERAPY | Facility: CLINIC | Age: 34
End: 2024-07-09
Payer: COMMERCIAL

## 2024-07-09 ENCOUNTER — TELEPHONE (OUTPATIENT)
Age: 34
End: 2024-07-09

## 2024-07-09 DIAGNOSIS — M25.532 ACUTE PAIN OF LEFT WRIST: Primary | ICD-10-CM

## 2024-07-09 PROCEDURE — 97110 THERAPEUTIC EXERCISES: CPT | Performed by: OCCUPATIONAL THERAPIST

## 2024-07-09 PROCEDURE — 97165 OT EVAL LOW COMPLEX 30 MIN: CPT | Performed by: OCCUPATIONAL THERAPIST

## 2024-07-09 NOTE — TELEPHONE ENCOUNTER
Dx Code: M25.532  CPT Coed: 45349  Date of Service: 07/09/24  Location//Facility Name/Address/Phone Number: [facility] in [place] Tel:[phone]  Location/Facility NPI: [id number]  Best phone number to reach Patient:[phone]

## 2024-07-09 NOTE — PROGRESS NOTES
OT Evaluation     Today's date: 2024  Patient name: Susana Burns  : 1990  MRN: [id number]  Referring provider: Kalyan Yusuf CRNP  Dx:   Encounter Diagnosis     ICD-10-CM    1. Acute pain of left wrist  M25.532 Ambulatory Referral to Physical Therapy    probable tendonitis, nursing infant so advise she hold NSAID use & advise tylenol prn instead; recommend ice few times/day, consult PT for further eval/treat                     Assessment  Impairments: lacks appropriate home exercise program, pain with function and unable to perform ADL  Symptom irritability: high    Assessment details: Pt is a 34 y/o woman who presents w/ acute wrist pain in the L wrist. She has a positive Finkelstein's test in the L wrist. She is a first time mother of a 2 month old. She feels pain w/ lifting, picking up her baby, washing her body, and fixing hair in a ponytail. She feels tenderness in her EPB and APL muscles in her L wrist. She also has tenderness in her FCR in the L wrist. Her wrist AROM is WFL and she feels pain w/ L wrist extension, RD, and UD. Her thumb extension is WFL, but she feels pain. Her  strength in her L hand is decreased from her R hand. She is able to make a full composite fist w/ no pain. 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 She lives in a two-story home w/ her  and two-month old son.  Barriers to therapy: High co-pay  Understanding of Dx/Px/POC: good     Prognosis: good    Goals  STG (1 week)  1. Pt will be compliant w/ HEP  2. Pt will decrease worst pain to 7/10 w/ lifting infant w/ correct body mechanics  3. Pt will increase wrist AROM by 5 degrees    LTG (6 weeks)  1. Pt will increase  strength on L hand by 5#  2. Pt will lift infant pain free using correct body mechanics  3.Pt will increase FOTO score to projected outcome or greater    Plan  Patient would benefit from: skilled occupational therapy, home program and orthotics  Referral  necessary: No  Planned modality interventions: thermotherapy: hydrocollator packs    Planned therapy interventions: IASTM, therapeutic exercise, therapeutic activities, home exercise program, activity modification, strengthening and kinesiology taping    Frequency: 1x week  Duration in weeks: 6  Plan of Care beginning date: 2024  Plan of Care expiration date: 2024  Plan details: Plan to see pt 1-2x per week to address pain w/ lifting and increase  strength.      Subjective Evaluation    History of Present Illness  Onset date: End .  Mechanism of injury: L wrist was swollen and sore          Not a recurrent problem   Quality of life: good    Patient Goals  Patient goals for therapy: decreased pain, increased motion, increased strength and independence with ADLs/IADLs    Pain  Current pain ratin  At best pain ratin  At worst pain rating: 10  Location: APL and EPB of L wrist  Quality: Shooting.  Relieving factors: rest  Aggravating factors: lifting (Putting hair up, washing body, picking up baby)  Progression: worsening    Social Support  Steps to enter house: yes  Stairs in house: yes   Lives in: multiple-level home  Lives with: spouse and young children    Employment status: working (Behavioral analyist)  Hand dominance: right        Objective     Palpation     Right   Tenderness of the flexor carpi radialis.     Additional Palpation Details  Tenderness on APL and EPB on L wrist    Active Range of Motion     Left Wrist   Wrist flexion: 51 degrees   Wrist extension: 62 degrees with pain  Radial deviation: 18 degrees with pain  Ulnar deviation: 35 degrees with pain      Left Thumb   Kapandji score: 9 degrees      Additional Active Range of Motion Details  Can make a composite fist w/ L hand    Strength/Myotome Testing     Left Wrist/Hand      (2nd hand position)     Trial 1: 22    Thumb Strength  Key/Lateral Pinch     Trial 1: 11  Tip/Two-Point Pinch     Trial 1:  7  Palmar/Three-Point Pinch     Trial 1: 10    Right Wrist/Hand      (2nd hand position)     Trial 1: 30    Thumb Strength   Key/Lateral Pinch     Trial 1: 10.5  Tip/Two-Point Pinch     Trial 1: 8.5  Palmar/Three-Point Pinch     Trial 1: 10    Tests     Left Wrist/Hand   Positive Finkelstein's.     Additional Tests Details  Positive Finklestein's test on L hand             Precautions: Universal      Manuals 7/9  IE            IASTM on thumb 6m                                                   Neuro Re-Ed             HEP & Behavioral modifications for lifting baby De Quervains 1x per day            KT Applied                                                                             Ther Ex             Eccentric wrist extension 2#   3x10                                                                                                       Ther Activity             Mini peg opposition and  4m                         Gait Training                                       Modalities              8m

## 2024-07-23 ENCOUNTER — TELEPHONE (OUTPATIENT)
Age: 34
End: 2024-07-23

## 2024-07-23 NOTE — TELEPHONE ENCOUNTER
Pt called she was running 15 minutes late. I called office no response. I msg on MR she wasn't in that office today/  ,  the pt said she was gonna try and still come    . I offered to r/s

## 2024-08-05 ENCOUNTER — OFFICE VISIT (OUTPATIENT)
Age: 34
End: 2024-08-05
Payer: COMMERCIAL

## 2024-08-05 VITALS — SYSTOLIC BLOOD PRESSURE: 118 MMHG | DIASTOLIC BLOOD PRESSURE: 74 MMHG

## 2024-08-05 PROCEDURE — 99205 OFFICE O/P NEW HI 60 MIN: CPT | Performed by: PEDIATRICS

## 2024-08-05 NOTE — PROGRESS NOTES
INITIAL BREAST FEEDING EVALUATION    Informant/Relationship: Susana and Sandeep/mom and dad    Discussion of General Lactation Issues: Breastfeeding was going well until about 6 weeks or so, ROSIE started to choke routinely throughout feedings at either breast or bottle. Speech therapy's evaluation raised concerns of lip and tongue tie. ROSIE has always made clicking noises and been off and on the breast. He always had blisters on both upper and lower lips. Susana states he was always noisy in addition to the clicking. He also made a lot of stridorous noises but this stops when he is fed side-lying. He has only ever made this noise when eating.    Susana had pain with pain with latch until first let down of 7-8/10 for the first month. She never had bleeding or nipple damage.    Around 6 weeks, Susana introduced a bottle that was primarily expressed breast milk during the day and formula bottles at night. Susana and his primary care provider were concerned that he was breastfeeding too frequently.    Infant is 3.5 months old today.        History:  Fertility Problem:yes - male factor infertility (dad had Klinefelter) used IVF  Breast changes:yes - bigger and heavy  : yes - induced, about 36 hours of fluids  Full term:yes - 40.1   labor:no  First nursing/attempt < 1 hour after birth:yes - immediately  Skin to skin following delivery:yes - immediately  Breast changes after delivery: unsure  Rooming in (infant in room with mother with exception of procedures, eg. Circumcision: yes - once for sleep at nursing suggestion and for circ  Blood sugar issues:no  NICU stay:no  Jaundice:no  Phototherapy:no  Supplement given: (list supplement and method used as well as reason(s):no    Past Medical History:   Diagnosis Date    40 weeks gestation of pregnancy 11/15/2023    Prenatal labs scanned - completed 2023 with [facility]      Anemia affecting pregnancy in third trimester 2024    Mild anemia (Hgb 10.7) noted  at 28 weeks.   [x] Start oral iron supplementation - [employer] message sent 1/30/2024.     Repeat CBC and ferritin at 36 weeks shows resolved anemia (Hgb 11.6, ferritin 30). Continue oral iron supplementation.      Anxiety during pregnancy in third trimester, antepartum 10/10/2023    On citalopram 10 mg      Hypothyroidism          Current Outpatient Medications:     citalopram (CeleXA) 10 mg tablet, Take 1 tablet (10 mg total) by mouth daily, Disp: 90 tablet, Rfl: 1    levothyroxine 75 mcg tablet, Take 1 tablet 6 days a week and 2 tablets on Sunday., Disp: 60 tablet, Rfl: 1    Prenat MV-Min w/Fe-Folate-DHA (PRENATAL COMPLETE PO), Take by mouth, Disp: , Rfl:     No Known Allergies    Social History     Substance and Sexual Activity   Drug Use Never       Social History     Interval Breastfeeding History:    Frequency of breast feeding: at least once daily since last office visit; primarily bottle fed  Does mother feel breastfeeding is effective: Yes; gets a little frustrated waiting for let down,but settles and seems satisfied  Does infant appear satisfied after nursing:Yes  Stooling pattern normal: lYes  Urinating frequently:Yes  Using shield or shells: No    Alternative/Artificial Feedings:   Bottle: Yes, side lying paced bottle feeding, offering the breast after the bottle when not satisfied  Cup: No  Syringe/Finger: No           Formula Type: Parents' Choice Sensitive                     Amount: 5 oz before bed and when he wakes at night, 4 oz as he needs            Breast Milk:                      Amount: 4 oz            Frequency Q 2.5-3 Hr between feedings during the day; sleeps about 4-5 hours at night  Elimination Problems: No      Equipment:  Nipple Shield             Type: n/a             Size: n/a             Frequency of Use: n/a  Pump            Type: Spectra            Frequency of Use: every 3 hours on average, during the day; none over night; typically collects an average 4 oz each time; about 16  oz/day  Shells            Type: n/a            Frequency of use: n/a    Equipment Problems: no    Mom:  Breast: Normal  Nipple Assessment in General: Normal: elongated/eraser, no discoloration and no damage noted.  Mother's Awareness of Feeding Cues                 Recognizes: Yes                  Verbalizes: Yes  Support System: nanny AWILDA  History of Breastfeeding: none  Changes/Stressors/Violence: ROSIE struggles at breast and bottle with choking unless lying on his side  Concerns/Goals: Susana wishes to feed directly at the breast more frequently and have him not choke; she wishes to leave him others who can safely feed him without detailed feeding instructions    Problems with Mom: hypogalactia, insufficient milk production    Physical Exam  Constitutional:       Appearance: Normal appearance. She is well-developed and normal weight.   HENT:      Head: Normocephalic and atraumatic.   Eyes:      Extraocular Movements: Extraocular movements intact.   Neck:      Thyroid: No thyromegaly.   Cardiovascular:      Rate and Rhythm: Normal rate and regular rhythm.      Pulses: Normal pulses.      Heart sounds: Normal heart sounds. No murmur heard.  Pulmonary:      Effort: Pulmonary effort is normal.      Breath sounds: Normal breath sounds.   Musculoskeletal:      Cervical back: Normal range of motion and neck supple.   Lymphadenopathy:      Cervical: No cervical adenopathy.      Upper Body:      Right upper body: No pectoral adenopathy.      Left upper body: No pectoral adenopathy.   Neurological:      General: No focal deficit present.      Mental Status: She is alert and oriented to person, place, and time.   Psychiatric:         Mood and Affect: Mood normal.         Behavior: Behavior normal.         Thought Content: Thought content normal.         Judgment: Judgment normal.   Vitals and nursing note reviewed.         Infant:  Behaviors: Alert  Color: Healthy  Birth weight: 3.615 kg  Current weight: 7.54 kg    Problems  with infant: Restricted tongue movement      General Appearance:  Alert, active, no distress                             Head:  Normocephalic, AFOF, sutures opposed                             Eyes:  Conjunctiva clear, no drainage                              Ears:  Normally placed, no anomolies                             Nose:  Septum intact, no drainage or erythema                           Mouth:  No lesions; tongue extends to but not over the lower lip, does not lateralize well, and remains flat with crying; there is poor cupping of the examiner's finger and very limited peristalsis; the seal on the examiner's finger was easily broken with gentle traction on the mandible; frenulum inserts to the tongue leaving 1/4 of the tongue blade free and along the middle of the inferior alveolar ridge; passive lift of the tongue narrows the oral gape                    Neck:  Supple, symmetrical, trachea midline, no adenopathy; thyroid: no enlargement, symmetric, no tenderness/mass/nodules                 Respiratory:  No grunting, flaring, retractions, breath sounds clear and equal            Cardiovascular:  Regular rate and rhythm. No murmur. Adequate perfusion/capillary refill. Femoral pulse present                    Abdomen:   Soft, non-tender, no masses, bowel sounds present, no HSM             Genitourinary:  Normal male, testes descended, no discharge, swelling, or pain, anus patent                          Spine:   No abnormalities noted        Musculoskeletal:  Full range of motion          Skin/Hair/Nails:   Skin warm, dry, and intact, no rashes or abnormal dyspigmentation or lesions                Neurologic:   No abnormal movement, tone appropriate for gestational age     Latch:  Efficiency:               Lips Flanged: Yes, after frenotomy              Depth of latch: Very good, after frenotomy              Audible Swallow: Yes, after frenotomy              Visible Milk: Yes, after frenotomy               Wide Open/ Asymmetrical: Yes, after frenotomy              Suck Swallow Cycle: Breathing: unlabored, Coordinated: yes  Nipple Assessment after latch: Normal: elongated/eraser, no discoloration and no damage noted.  Latch Problems: After the frenotomy, Susana easily assists MJ to a wider, deeper, more asymmetrical, and more comfortable attachment. Once he settles, he attains a sustained SSB and breastfeeds until he falls asleep. When he is removed from the first breast, he attaches to the second, but does not maintain his attachment and is offered the bottle with a much wider and more effective suck.     Position:  Infant's Ergonomics/Body               Body Alignment: Yes               Head Supported: Yes               Close to Mom's body/ Lifted/ Supported: Yes               Mom's Ergonomics/Body: Yes                           Supported: Yes                           Sitting Back: Yes                           Brings Baby to her breast: Yes  Positioning Problems: None, either at the breast or for paced bottle feeding        Education:  Reviewed Latch: Reviewed how to gently compress the breast as if offering a sandwich to facilitate a deeper latch.    Reviewed Positioning for Dyad: Reviewed how to bring baby to the breast so that his lower lip and chin touch the breast with his nose just above the nipple to encourage a wider, more asymmetric latch.   Reviewed Frequency/Supply & Demand: Recommended feeding on demand: when the baby gives hunger cues, when the breasts feel full, every 3 hours during the day and every 5 hours at night counting from the beginning of one feeding to the beginning of the next; whichever comes first.    Reviewed Alternative/Artificial Feedings: Paced bottle feeding  Reviewed Equipment: Reviewed sizing of flanges and pump settings for most comfortable and most effective use of the pump; reviewed appropriate frequency and and length of pumping sessions.      Plan:  Discussed history and  physical exams with Susana. Support given for her commitment to providing breast milk for her baby. Discussed the findings on the baby's exam consistent with tongue tie and reviewed how this may be the cause of nipple trauma, nipple pain, nipple damage, poor milk transfer, blocked ducts, mastitis, and loss of milk production. Discussed the science that supports performing the frenotomy to improve latch.   Recommended breastfeeding as often as comfortable, expressing breast milk when feeding by bottle. Reviewed how to fit flanges for most comfortable and effective expression. Also reviewed how to maximize effectiveness of expression using the pump settings.     I have spent 60 minutes with Patient and family today in which greater than 50% of this time was spent in counseling/coordination of care regarding Prognosis, Risks and benefits of tx options, Instructions for management, Patient and family education, Importance of tx compliance, Risk factor reductions, Impressions, Counseling / Coordination of care, Documenting in the medical record, Reviewing / ordering tests, medicine, procedures  , and Obtaining or reviewing history  .         Yes (specify)

## 2024-08-05 NOTE — PATIENT INSTRUCTIONS
When expressing milk your nipples should move freely in the flange tunnel with little to no areola joining them. You may wish to try a smaller flange for the left breast.     Start the pump on the massage setting until let down occurs, then switch to the expression setting. When flow slows, switch back to the massage setting. When flow increases again, switch back to massage until flow slows once again. Expression should not take more than 15-20 minutes. Use the lowest effective suction.     It is typically recommended to express breast milk whenever the baby is fed by bottle, so that the breasts are stimulated well about every 3 hours during the day with no more than a 5-6 hour break at night. Focus on breastfeeding or expressing milk as often as you feel comfortable so that you feel well enough to take care of MJ and provide as much of your milk as you are able.

## 2024-08-06 ENCOUNTER — OFFICE VISIT (OUTPATIENT)
Dept: ENDOCRINOLOGY | Facility: HOSPITAL | Age: 34
End: 2024-08-06
Payer: COMMERCIAL

## 2024-08-06 VITALS
HEART RATE: 85 BPM | HEIGHT: 66 IN | BODY MASS INDEX: 37.61 KG/M2 | OXYGEN SATURATION: 98 % | SYSTOLIC BLOOD PRESSURE: 110 MMHG | WEIGHT: 234 LBS | DIASTOLIC BLOOD PRESSURE: 80 MMHG

## 2024-08-06 DIAGNOSIS — E66.9 OBESITY (BMI 30.0-34.9): ICD-10-CM

## 2024-08-06 DIAGNOSIS — E06.3 HYPOTHYROIDISM DUE TO HASHIMOTO'S THYROIDITIS: ICD-10-CM

## 2024-08-06 DIAGNOSIS — E03.8 HYPOTHYROIDISM DUE TO HASHIMOTO'S THYROIDITIS: ICD-10-CM

## 2024-08-06 DIAGNOSIS — E28.2 PCOS (POLYCYSTIC OVARIAN SYNDROME): Primary | ICD-10-CM

## 2024-08-06 PROCEDURE — 99214 OFFICE O/P EST MOD 30 MIN: CPT | Performed by: STUDENT IN AN ORGANIZED HEALTH CARE EDUCATION/TRAINING PROGRAM

## 2024-08-06 NOTE — PROGRESS NOTES
Established Patient Progress Note       Chief Complaint   Patient presents with    Hypothyroidism      History of Present Illness:     Susana Burns is a 33 y.o. female with a history of PCOS, hypothyroidism who presents today for follow up. Last visit 02/24 for hypothyroid during pregnancy management. She's now post partum and presents for routine follow up    Hypothyroidism:- Patient was dx 4-5 years ago and started on low dose levothyroxine and slowly dose escalated to 75mcg daily. Last dose adjustment increased dose to 75mcg 6 days a week and 2 tab on Sunday when got pregnant. Remains on this dose still. Most recent labs 03/24 TSH 1.17, with t4 1.06. but she was pregnant then. Labs since 07/24 TSH <0.005 with T4 1.59, Reports feeling tired and some muscle aches but denies any weight loss, diarrhea, tremors, palpitations. Dysphagia, odynophagia.     PCOS:- Hx of irregular menses since past couple of years. Indicates menses were more regular when was on vegan diet, with mild hirsutism with facial acne and hair growth primarily on face. Workup in past normal DHEA-s, Prolactin, Thyroid, 17 hydroxyprogrestrone with mildly elevated testosterone at 50 and recent US pelvis finding of multiple hemorrhagic cysts in b/l ovaries. Based on this Dx with PCOS.   Currently menses:- has not returned since birth  Symptoms of hyperandrogenism- Never tried on aldactone  Fertility-  will be going through IVF again in may through [facility].   Last HbA1C:- 10/23 5.6%, Last Lipid:- 10.23 normal.  No GDM during pregnancy    Social Hx:- Quit smoking Mook, rare alcohol use, no other drug use, works as a behaviour health therapist  Family Hx:- mother and sister have hypothyroidism     Patient Active Problem List   Diagnosis    Hypothyroidism    Mild episode of recurrent major depressive disorder (HCC)    Obesity (BMI 30.0-34.9)    PCOS (polycystic ovarian syndrome)    Hypothyroid in pregnancy, antepartum    Pregnancy resulting  from in vitro fertilization in third trimester    Pyelectasis    Postpartum state      Past Medical History:   Diagnosis Date    40 weeks gestation of pregnancy 11/15/2023    Prenatal labs scanned - completed 2023 with [facility]      Anemia affecting pregnancy in third trimester 2024    Mild anemia (Hgb 10.7) noted at 28 weeks.   [x] Start oral iron supplementation - [employer] message sent 2024.     Repeat CBC and ferritin at 36 weeks shows resolved anemia (Hgb 11.6, ferritin 30). Continue oral iron supplementation.      Anxiety during pregnancy in third trimester, antepartum 10/10/2023    On citalopram 10 mg      Hypothyroidism       History reviewed. No pertinent surgical history.   Family History   Problem Relation Age of Onset    Thyroid disease unspecified Mother     Heart disease Mother     Stroke Mother     Hyperthyroidism Mother     Diabetes Brother     No Known Problems Father     Colon cancer Cousin      Social History     Tobacco Use    Smoking status: Former     Current packs/day: 0.00     Types: Cigarettes     Quit date: 3/17/2022     Years since quittin.3     Passive exposure: Never    Smokeless tobacco: Never    Tobacco comments:     2 cig a day since age 13   Substance Use Topics    Alcohol use: Yes     No Known Allergies    Current Outpatient Medications:     citalopram (CeleXA) 10 mg tablet, Take 1 tablet (10 mg total) by mouth daily, Disp: 90 tablet, Rfl: 1    levothyroxine 75 mcg tablet, Take 1 tablet 6 days a week and 2 tablets on ., Disp: 60 tablet, Rfl: 1    Prenat MV-Min w/Fe-Folate-DHA (PRENATAL COMPLETE PO), Take by mouth, Disp: , Rfl:     Review of Systems   Constitutional:  Positive for fatigue. Negative for unexpected weight change.   HENT:  Negative for trouble swallowing and voice change.    Eyes:  Negative for photophobia and visual disturbance.   Respiratory:  Negative for choking and shortness of breath.    Gastrointestinal:  Positive for constipation. Negative for  "diarrhea.   Endocrine: Negative for cold intolerance and heat intolerance.   Musculoskeletal:  Negative for arthralgias and myalgias.   Skin:  Negative for rash.       Physical Exam:  Body mass index is 37.77 kg/m².  /80   Pulse 85   Ht 5' 6\" (1.676 m)   Wt 106 kg (234 lb)   SpO2 98%   BMI 37.77 kg/m²    Wt Readings from Last 3 Encounters:   08/06/24 106 kg (234 lb)   06/25/24 101 kg (222 lb 9.6 oz)   05/13/24 96.4 kg (212 lb 9.6 oz)       Physical Exam  Constitutional:       Appearance: Normal appearance. She is obese.   Cardiovascular:      Rate and Rhythm: Normal rate and regular rhythm.      Pulses: Normal pulses.   Pulmonary:      Effort: Pulmonary effort is normal.   Abdominal:      General: Abdomen is flat. Bowel sounds are normal.      Palpations: Abdomen is soft.   Skin:     General: Skin is warm and dry.      Capillary Refill: Capillary refill takes less than 2 seconds.   Neurological:      General: No focal deficit present.      Mental Status: She is alert and oriented to person, place, and time.   Psychiatric:         Mood and Affect: Mood normal.         Labs:    Latest Reference Range & Units 01/29/24 13:29 03/26/24 10:50 07/25/24 11:42   TSH, POC 0.450 - 4.500 uIU/mL 1.020 1.170 <0.005 (L)   FREE T4 0.82 - 1.77 ng/dL 1.14 1.06 1.59   (L): Data is abnormally low     Latest Reference Range & Units 10/06/23 06:41   Hemoglobin A1C 4.8 - 5.6 % 5.3   eAG, EST AVG Glucose mg/dL 105      Latest Reference Range & Units 10/06/23 06:41   Cholesterol 100 - 199 mg/dL 183   Triglycerides 0 - 149 mg/dL 133   HDL >39 mg/dL 65   LDL Calculated 0 - 99 mg/dL 95   VLDL Cholesterol Kenn 5 - 40 mg/dL 23          Latest Reference Range & Units Most Recent   Testosterone, Total, LC/MS 2 - 45 ng/dL 50 (H)  10/25/21 07:15   TESTOSTERONE FREE 0.1 - 6.4 pg/mL 4.4  10/25/21 07:15   (H): Data is abnormally high     Latest Reference Range & Units Most Recent   17-OH PROGESTERONE see note ng/dL 39  10/25/21 07:15 "   DHEA-SO4 23 - 266 mcg/dL 125  10/25/21 07:15   LUTEINIZING HORMONE mIU/mL 5.1  10/25/21 07:15   FSH, POC mIU/mL 6.4  10/25/21 07:15   PROLACTIN ng/mL 9.3  10/25/21 07:15       US pelvis   PELVIC ULTRASOUND, COMPLETE     INDICATION:  abnormal ovaries on exam.  As per review of electronic medical record,  patient with history of PCOS status post IVF egg retrieval on 2/23/2023 presenting with left lower quadrant pain.     COMPARISON: Pelvic ultrasound from 3/21/2022 and CT of the abdomen and pelvis from earlier today.     TECHNIQUE:   Transabdominal pelvic ultrasound was performed in sagittal and transverse planes with a curvilinear transducer.  Additional transvaginal imaging was performed to better evaluate the endometrium and ovaries.  Imaging included volumetric   sweeps as well as traditional still imaging technique.     FINDINGS:      UTERUS:  The uterus is anteverted in position and normal in size, measuring 7.6 x 3.4 x 4.6 cm.   Contour and echotexture appear normal.  The cervix shows no suspicious abnormality.     ENDOMETRIUM:    Normal caliber of 7 mm.   Homogeneous and normal in appearance.     OVARIES/ADNEXA:  The right ovary measures 7.5 x 4.5 x 4.6 cm, with a volume of 81.1 mL.  The left ovary measures 4.3 x 3.8 x 4.9 cm, with a volume of 42.5 mL.  The ovarian volume is increased compared to the prior study from March 2022 where the right ovary had a volume of 10.8 mL in the left ovary had a volume of 9.3 mL.  Multiple hemorrhagic follicles are seen in both ovaries.  Doppler flow within normal limits.     Small amount of free pelvic fluid is seen in the pelvis containing low level internal echoes, likely representing blood products.  No organized fluid collections.     IMPRESSION:     Both ovaries are enlarged, right greater than left, and contain multiple hemorrhagic follicles.  Although this appearance may be secondary to the underlying polycystic ovarian syndrome, the ovaries are significantly  enlarged compared to March 2022.    Ovarian hyperstimulation syndrome (OHSS) is possible, although the ovaries are not as large as typical of OHSS.     No evidence of ovarian torsion.     Small amount of free pelvic fluid in the pelvis containing low level internal echoes, likely representing blood products, however infected ascites is not excluded.  No organized collections.     Unremarkable sonographic appearance of the uterus.     The study was marked in EPIC for immediate notification.    Impression & Plan:    Problem List Items Addressed This Visit          Endocrine    Hypothyroidism    Relevant Orders    T4, free    TSH, 3rd generation    Lipid panel    Hemoglobin A1C    Comprehensive metabolic panel    T4, free    TSH, 3rd generation    PCOS (polycystic ovarian syndrome) - Primary    Relevant Orders    T4, free    TSH, 3rd generation    Lipid panel    Hemoglobin A1C    Comprehensive metabolic panel    T4, free    TSH, 3rd generation       Other    Obesity (BMI 30.0-34.9)    Relevant Orders    T4, free    TSH, 3rd generation    Lipid panel    Hemoglobin A1C    Comprehensive metabolic panel    T4, free    TSH, 3rd generation         Orders Placed This Encounter   Procedures    T4, free     Standing Status:   Future     Number of Occurrences:   1     Standing Expiration Date:   8/6/2025    TSH, 3rd generation     This is a patient instruction: This test is non-fasting. Please drink two glasses of water morning of bloodwork.        Standing Status:   Future     Number of Occurrences:   1     Standing Expiration Date:   8/6/2025    Lipid panel     This is a patient instruction: This test requires patient fasting for 10-12 hours or longer. Drinking of black coffee or black tea is acceptable.     Standing Status:   Future     Number of Occurrences:   1     Standing Expiration Date:   8/6/2025    Hemoglobin A1C     Standing Status:   Future     Number of Occurrences:   1     Standing Expiration Date:   8/6/2025     Comprehensive metabolic panel     This is a patient instruction: Patient fasting for 8 hours or longer recommended.     Standing Status:   Future     Number of Occurrences:   1     Standing Expiration Date:   8/6/2025    T4, free     Standing Status:   Future     Number of Occurrences:   1     Standing Expiration Date:   8/6/2025    TSH, 3rd generation     This is a patient instruction: This test is non-fasting. Please drink two glasses of water morning of bloodwork.        Standing Status:   Future     Number of Occurrences:   1     Standing Expiration Date:   8/6/2025       There are no Patient Instructions on file for this visit.    Patient is a 32y F with subclinical hypothyroidism and PCOS who presents today for follow up    1) Hypothyroidism:- Clinically she feels well, however her most recent labs show suppressed TSH <.005 but normal T4. Her prepregnancy dose of 75mcg 6 days a week and 1.5 tab on Sunday vs during pregnancy we had increased dose to 75mcg 6 days a week and 2 tab sun. She is asymptomatic and hence discussed no urgency to reduce dose unless repeat labs still show suppressed TSH. Recommend repeating TFT in 6 weeks and if still low we can adjust dose back to pre pregnancy levels.    2) PCOS:-   Menses- breastfeeding and hence not having menses yet. This is normal. Will also be undergoing IVF again and hence wont worry about this until after her second pregnancy  Hyperandrogenism- mild symptoms, non concerning, hold off on aldactone for now until breast feeding and also done with pregnancy  Fertility- going back to [facility] in may 2025. Has frozen embroyo  Uptodate with metabolic workup, repeat HbA1C and Lipid in 1 year. 10/24    RTC in 6 months      Discussed with the patient and all questioned fully answered. She will call me if any problems arise.      Counseled patient on diagnostic results, prognosis, risk and benefit of treatment options, instruction for management, importance of treatment  compliance, Risk  factor reduction and impressions      Nanda Ocampo MD

## 2024-08-12 ENCOUNTER — OFFICE VISIT (OUTPATIENT)
Age: 34
End: 2024-08-12
Payer: COMMERCIAL

## 2024-08-12 VITALS — SYSTOLIC BLOOD PRESSURE: 108 MMHG | DIASTOLIC BLOOD PRESSURE: 64 MMHG

## 2024-08-12 PROCEDURE — 99215 OFFICE O/P EST HI 40 MIN: CPT | Performed by: PEDIATRICS

## 2024-08-12 NOTE — PATIENT INSTRUCTIONS
"Continue to offer the breast as often as you feel comfortable. Offering the breast first before offering supplement is a great way to encourage more direct breastfeeding.     Express breast milk as often as you have been, especially when MJ is fed a bottle instead of going to the breast for a feeding at a typical \"pumping time.\" If you offer the breast instead of expressing, you only need to express if you feel very full after he feeds. If you feed him a bottle, do not feel that you need to also express. This is triple feeding and is usually overwhelming and not necessary.  "

## 2024-08-12 NOTE — PROGRESS NOTES
BREAST FEEDING FOLLOW UP VISIT    Informant/Relationship: Susana/mom    Discussion of General Lactation Issues: Susana states ROSIE is choking less at the breast and at the bottle. He is also making less clicking noises. Very infrequent gaspy  noises. Overall, Susana feels that the frenotomy has helped. She states that he is feeding better. ROSIE was a little fussy for the first day or two immediately following the frenotomy, but not with feeding.     Infant is  3 weeks 3 months old today.    Interval Breastfeeding History:    Frequency of breast feeding: once to twice daily  Does mother feel breastfeeding is effective: Yes  Does infant appear satisfied after nursing:Yes  Stooling pattern normal:Yes  Urinating frequently:Yes  Using shield or shells:No    Alternative/Artificial Feedings:   Bottle: Yes, side lying paced bottle feeding  Cup: No  Syringe/Finger: No           Formula Type: Parents' Choice Sensitive                     Amount: 5 oz before bed, when he wakes at night            Breast Milk:                      Amount: 4 (to occasionally 5) oz            Frequency Q 2.5 to 3 Hr between feedings during the day; up to 4-5 hours at night  Elimination Problems: No      Equipment:  Nipple Shield             Type: n/a             Size: n/a             Frequency of Use: n/a  Pump            Type: Spectra            Frequency of Use: about 4 x/day; collects about 16 oz/day  Shells            Type: n/a            Frequency of use: n/a    Equipment Problems: no      Mom:  Breast: Normal  Nipple Assessment in General: Normal: elongated/eraser, no discoloration and no damage noted.  Mother's Awareness of Feeding Cues                 Recognizes: Yes                  Verbalizes: Yes  Support System: nanny AWILDA  History of Breastfeeding: none  Changes/Stressors/Violence: Breastfeeding is improved; Susana is hoping that ROSIE will feed at the breast more frequently when she is home  Concerns/Goals: Martha wishes to do more  direct breastfeeding when available.    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 She is normal weight.   HENT:      Head: Normocephalic and atraumatic.   Neurological:      General: No focal deficit present.      Mental Status: She is alert and oriented to person, place, and time.   Psychiatric:         Mood and Affect: Mood normal.         Behavior: Behavior normal.         Thought Content: Thought content normal.         Judgment: Judgment normal.   Vitals and nursing note reviewed.         Infant:  Behaviors: Alert  Color: Healthy  Birth weight: 3.615 kg  Current weight: 7.62 kg    Problems with infant: s/p frenotomy      General Appearance:  Alert, active, no distress                             Head:  Normocephalic, AFOF, sutures opposed                             Eyes:  Conjunctiva clear, no drainage                              Ears:  Normally placed, no anomolies                             Nose:  Septum intact, no drainage or erythema                           Mouth:  No lesions; tongue extends over the lower lip, lateralizes well, and lifts to about mid mouth with crying; the frenotomy wound is healing well                    Neck:  Supple, symmetrical, trachea midline, no adenopathy; thyroid: no enlargement, symmetric, no tenderness/mass/nodules                 Respiratory:  No grunting, flaring, retractions, breath sounds clear and equal            Cardiovascular:  Regular rate and rhythm. No murmur. Adequate perfusion/capillary refill. Femoral pulse present                    Abdomen:   Soft, non-tender, no masses, bowel sounds present, no HSM             Genitourinary:  Normal male, testes descended, no discharge, swelling, or pain, anus patent                          Spine:   No abnormalities noted        Musculoskeletal:  Full range of motion          Skin/Hair/Nails:   Skin warm, dry, and intact, no rashes or abnormal dyspigmentation or  lesions                Neurologic:   No abnormal movement, tone appropriate for gestational age    [place] Latch:  Efficiency:               Lips Flanged: Yes              Depth of latch: Very good              Audible Swallow: Yes, sustained SSB              Visible Milk: Yes              Wide Open/ Asymmetrical: Yes              Suck Swallow Cycle: Breathing: Unlabored, Coordinated: Yes  Nipple Assessment after latch: Normal: elongated/eraser, no discoloration and no damage noted.  Latch Problems: With gentle compression of the breast with fingers and thumb in parallel with ROSIE's lips, Susana can assist him to a wide, deep, asymmetrical, and comfortable attachment where he quickly attains a sustained SSB and breastfeeds at one breast until content.     Position:  Infant's Ergonomics/Body               Body Alignment: Yes               Head Supported: Yes               Close to Mom's body/ Lifted/ Supported: Yes               Mom's Ergonomics/Body: Yes                           Supported: Yes                           Sitting Back: Yes                           Brings Baby to her breast: Yes  Positioning Problems: None        Education:  Reviewed Latch: Reviewed how to gently compress the breast as if offering a sandwich to facilitate a deeper latch.    Reviewed Positioning for Dyad: Reviewed how to bring baby to the breast so that his lower lip and chin touch the breast with his nose just above the nipple to encourage a wider, more asymmetric latch.   Reviewed Frequency/Supply & Demand:Recommended feeding on demand: when the baby gives hunger cues, when the breasts feel full, every 3 hours during the day and every 5 hours at night counting from the beginning of one feeding to the beginning of the next; whichever comes first.    Reviewed Alternative/Artificial Feedings: Paced bottle feeding  Reviewed Mom/Breast care: Express breast milk whenever MJ is fed only by bottle; may express after breastfeeding if feeling  "very full; avoid triple feeding      Plan:  Discussed history and physical exams with Susana. Gave support for continued breastfeeding and her commitment to providing her milk. Reassurance given that ROSIE is gaining weight well.   Recommended continuing to offer the breast as often as comfortable. Encouraged offering the breast before offering supplement as a way to encourage more direct breastfeeding. When ROSIE is fed by bottle at a time when she would typically express, recommended continued milk expression. Do not recommend \"triple feeding.\" May consider expressing breast milk when MJ was fed at the breast, but the breast still feels uncomfortably full.    Additional support remains available.     I have spent 45 minutes with Patient  today in which greater than 50% of this time was spent in counseling/coordination of care regarding Prognosis, Risks and benefits of tx options, Instructions for management, Patient and family education, Importance of tx compliance, Risk factor reductions, Impressions, Counseling / Coordination of care, Documenting in the medical record, and Obtaining or reviewing history  .                                                                            "

## 2024-08-13 ENCOUNTER — ANNUAL EXAM (OUTPATIENT)
Dept: OBGYN CLINIC | Facility: CLINIC | Age: 34
End: 2024-08-13
Payer: COMMERCIAL

## 2024-08-13 VITALS
HEIGHT: 66 IN | WEIGHT: 237.6 LBS | BODY MASS INDEX: 38.18 KG/M2 | DIASTOLIC BLOOD PRESSURE: 70 MMHG | SYSTOLIC BLOOD PRESSURE: 106 MMHG

## 2024-08-13 DIAGNOSIS — Z01.419 ENCOUNTER FOR ANNUAL ROUTINE GYNECOLOGICAL EXAMINATION: Primary | ICD-10-CM

## 2024-08-13 PROCEDURE — S0612 ANNUAL GYNECOLOGICAL EXAMINA: HCPCS | Performed by: STUDENT IN AN ORGANIZED HEALTH CARE EDUCATION/TRAINING PROGRAM

## 2024-08-13 NOTE — PROGRESS NOTES
[facility] [place] [address] [place] [id number]    ASSESSMENT/PLAN: Susana Burns is a 33 y.o.  who presents for annual gynecologic exam.    Encounter for routine gynecologic examination  - Routine well woman exam completed today.  - Cervical Cancer Screening: Current ASCCP Guidelines reviewed. Last Pap: 2021. History of abnormal: None  - HPV Vaccination status: Not immunized. Reviewed availability of vaccination up until age 45.   - STI screening offered including HIV testing: offered, pt declined  - Contraceptive counseling discussed.  Current contraception: None. Not currently sexually active. Declines contraception today.    - The following were reviewed in today's visit: breast self exam, diet, and exercise.     Additional problems addressed during this visit:  1. Encounter for annual routine gynecological examination      CC:  Annual Gynecologic Examination    HPI: Susana Burns is a 33 y.o.  who presents for annual gynecologic examination. She is without complaint today.     ROS: Negative except as noted in HPI    No LMP recorded (lmp unknown).       She  reports being sexually active and has had partner(s) who are male. She reports using the following method of birth control/protection: None.       The following portions of the patient's history were reviewed and updated as appropriate:   Past Medical History:   Diagnosis Date    Depression     Hypothyroidism     PCOS (polycystic ovarian syndrome)      History reviewed. No pertinent surgical history.  Family History   Problem Relation Age of Onset    Thyroid disease unspecified Mother     Heart disease Mother     Stroke Mother     Hyperthyroidism Mother     No Known Problems Father     Diabetes Brother     Colon cancer Cousin     Breast cancer Neg Hx     Ovarian cancer Neg Hx     Uterine cancer Neg Hx      Social History     Socioeconomic History    Marital status: /Civil Union     Spouse name: None  "   Number of children: None    Years of education: None    Highest education level: None   Occupational History    Occupation: behavioral therapist   Tobacco Use    Smoking status: Former     Current packs/day: 0.00     Types: Cigarettes     Quit date: 3/17/2022     Years since quittin.4     Passive exposure: Never    Smokeless tobacco: Never    Tobacco comments:     2 cig a day since age 13   Vaping Use    Vaping status: Never Used   Substance and Sexual Activity    Alcohol use: Yes    Drug use: Never    Sexual activity: Yes     Partners: Male     Birth control/protection: None   Other Topics Concern    None   Social History Narrative    None     Social Determinants of Health     Financial Resource Strain: Not on file   Food Insecurity: No Food Insecurity (3/13/2024)    Hunger Vital Sign     Worried About Running Out of Food in the Last Year: Never true     Ran Out of Food in the Last Year: Never true   Transportation Needs: No Transportation Needs (3/13/2024)    PRAPARE - Transportation     Lack of Transportation (Medical): No     Lack of Transportation (Non-Medical): No   Physical Activity: Not on file   Stress: Not on file   Social Connections: Not on file   Intimate Partner Violence: Not on file   Housing Stability: Low Risk  (3/13/2024)    Housing Stability Vital Sign     Unable to Pay for Housing in the Last Year: No     Number of Times Moved in the Last Year: 1     Homeless in the Last Year: No     Outpatient Medications Marked as Taking for the 24 encounter (Annual Exam) with Ibrahima Horne MD   Medication    citalopram (CeleXA) 10 mg tablet    levothyroxine 75 mcg tablet    Prenat MV-Min w/Fe-Folate-DHA (PRENATAL COMPLETE PO)     No Known Allergies        Objective:  /70 (BP Location: Left arm, Patient Position: Sitting, Cuff Size: Standard)   Ht 5' 6\" (1.676 m)   Wt 108 kg (237 lb 9.6 oz)   LMP  (LMP Unknown)   Breastfeeding Yes   BMI 38.35 kg/m²        Chaperone present? Yes: Radha" Palladino, MA.    General Appearance: alert and oriented, in no acute distress.   Neck/Thyroid: No thyromegaly, no thyroid nodules.  Respiratory: Unlabored breathing.  Cardiovascular: Regular rate, no peripheral edema.  Abdomen: Soft, non-tender, non-distended, no masses, no rebound or guarding.  Breast Exam: No dimpling, nipple retraction or discharge. No lumps or masses. No axillary or supraclavicular nodes.   Pelvic:       External genitalia: Normal appearance, no abnormal pigmentation, no lesions or masses. Normal Bartholin's and Hawkins's.      Urinary system: Urethral meatus normal, bladder non-tender.      Vaginal: normal mucosa without prolapse or lesions. Normal-appearing physiologic discharge.      Cervix: Normal-appearing, well-epithelialized, no gross lesions or masses. No cervical motion tenderness.      Adnexa: No adnexal masses or tenderness noted.      Uterus: Normal-sized, regular contour, midline, mobile, no uterine tenderness.  Extremities: Normal range of motion. Warm, well-perfused, non-tender.   Skin: normal, no rash or abnormalities  Neurologic: alert, oriented x3  Psychiatric: Appropriate affect, mood stable, cooperative with exam.        Ibrahima Horne MD  8/13/2024 9:34 AM

## 2024-08-25 DIAGNOSIS — E03.9 ACQUIRED HYPOTHYROIDISM: ICD-10-CM

## 2024-08-26 RX ORDER — LEVOTHYROXINE SODIUM 75 UG/1
TABLET ORAL
Qty: 60 TABLET | Refills: 0 | Status: SHIPPED | OUTPATIENT
Start: 2024-08-26

## 2024-09-13 ENCOUNTER — OFFICE VISIT (OUTPATIENT)
Dept: FAMILY MEDICINE CLINIC | Facility: HOSPITAL | Age: 34
End: 2024-09-13
Payer: COMMERCIAL

## 2024-09-13 VITALS
BODY MASS INDEX: 39.05 KG/M2 | HEART RATE: 95 BPM | HEIGHT: 66 IN | SYSTOLIC BLOOD PRESSURE: 106 MMHG | DIASTOLIC BLOOD PRESSURE: 66 MMHG | OXYGEN SATURATION: 98 % | WEIGHT: 243 LBS | TEMPERATURE: 97.5 F

## 2024-09-13 DIAGNOSIS — E06.3 HYPOTHYROIDISM DUE TO HASHIMOTO'S THYROIDITIS: ICD-10-CM

## 2024-09-13 DIAGNOSIS — E03.8 HYPOTHYROIDISM DUE TO HASHIMOTO'S THYROIDITIS: ICD-10-CM

## 2024-09-13 DIAGNOSIS — F33.0 MILD EPISODE OF RECURRENT MAJOR DEPRESSIVE DISORDER (HCC): Primary | ICD-10-CM

## 2024-09-13 PROCEDURE — 99214 OFFICE O/P EST MOD 30 MIN: CPT | Performed by: INTERNAL MEDICINE

## 2024-09-13 NOTE — PROGRESS NOTES
Ambulatory Visit  Name: Susana Burns      : 1990      MRN: [id number]  Encounter Provider: Rere Srinivasan DO  Encounter Date: 2024   Encounter department: [facility] [address]     Assessment & Plan  Mild episode of recurrent major depressive disorder (HCC)  Mood doing well with current Celexa, she is almost 5 mo postpartum and is feeling well, she denies postpartum depression, she is sleeping pretty well considering baby at home, she feels no med changes are needed, call with new/worse mood, re-eval in 6 mos or sooner if needed         Hypothyroidism due to Hashimoto's thyroiditis  Recent TSH suppressed at <0.005, just saw Endo, no changes made  to Levothyroxine but close f/u TSH was ordered, pt only notes hair loss which may be postpartum related, con't meds/labs and f/u as per Endo          PAP  (Dr. Metzger) - just had pelvic exam with Dr. Horne 24    BW 10/23  TSH       History of Present Illness     HPI Pt here for follow up appt    She had her baby boy in April and had no issues with delivery or postpartum care.  She is back to work and adjusting well.  She notes mood has been good overall.  She notes fuse is a bit shorter if she forgets to take her Celexa but this does not happen frequently. She is happy with current dose and feels nothing needs to be changed.     Pt saw [facility] (Dr. Ocampo) in Aug for f/u hypothyroidism - OV note reviewed.  Most recent TSH from July was <0.005.  Her Levothyroxine was unchanged as she was asymptomatic.  Repeat labs were recommended for 6 wks and if still low it was discussed decreasing Levothyroxine back to pre pregnancy dose. She notes no tremor/palp/C/D/skin changes. She has had some hair loss with postpartum period.  She is nursing             Review of Systems   Constitutional:  Negative for chills and fever.   Respiratory:  Negative for cough and shortness of breath.    Cardiovascular:  Negative for chest pain and  "palpitations.   Gastrointestinal:  Negative for abdominal pain, constipation and diarrhea.   Genitourinary:  Negative for difficulty urinating and dysuria.   Musculoskeletal:  Negative for arthralgias and gait problem.   Skin:  Negative for rash and wound.   Neurological:  Negative for dizziness and headaches.   Psychiatric/Behavioral:  Negative for dysphoric mood and sleep disturbance.            Objective     /66   Pulse 95   Temp 97.5 °F (36.4 °C)   Ht 5' 6\" (1.676 m)   Wt 110 kg (243 lb)   SpO2 98%   BMI 39.22 kg/m²     Physical Exam  Vitals and nursing note reviewed.   Constitutional:       General: She is not in acute distress.     Appearance: She is well-developed. She is not ill-appearing.   HENT:      Head: Normocephalic and atraumatic.      Right Ear: External ear normal.      Left Ear: External ear normal.   Eyes:      General:         Right eye: No discharge.         Left eye: No discharge.      Conjunctiva/sclera: Conjunctivae normal.   Neck:      Thyroid: No thyromegaly.      Trachea: No tracheal deviation.   Cardiovascular:      Rate and Rhythm: Normal rate and regular rhythm.      Heart sounds: Normal heart sounds. No murmur heard.  Pulmonary:      Effort: Pulmonary effort is normal. No respiratory distress.      Breath sounds: Normal breath sounds. No wheezing, rhonchi or rales.   Musculoskeletal:      Cervical back: Neck supple.   Skin:     General: Skin is warm and dry.      Coloration: Skin is not pale.      Findings: No bruising or rash.   Neurological:      General: No focal deficit present.      Mental Status: She is alert. Mental status is at baseline.      Motor: No abnormal muscle tone.      Gait: Gait normal.   Psychiatric:         Mood and Affect: Mood normal.         Behavior: Behavior normal.         Thought Content: Thought content normal.         Judgment: Judgment normal.         "

## 2024-09-13 NOTE — ASSESSMENT & PLAN NOTE
Recent TSH suppressed at <0.005, just saw Endo, no changes made  to Levothyroxine but close f/u TSH was ordered, pt only notes hair loss which may be postpartum related, con't meds/labs and f/u as per Endo

## 2024-09-13 NOTE — ASSESSMENT & PLAN NOTE
Mood doing well with current Celexa, she is almost 5 mo postpartum and is feeling well, she denies postpartum depression, she is sleeping pretty well considering baby at home, she feels no med changes are needed, call with new/worse mood, re-eval in 6 mos or sooner if needed

## 2024-09-16 ENCOUNTER — TELEPHONE (OUTPATIENT)
Age: 34
End: 2024-09-16

## 2024-09-16 DIAGNOSIS — M99.01 SOMATIC DYSFUNCTION OF SPINE, CERVICAL: Primary | ICD-10-CM

## 2024-09-16 NOTE — TELEPHONE ENCOUNTER
Patient is requesting an insurance referral for the following specialty:      Test Name / Order Name: [id number]    DX Code: M99.01    Date Of Service: 9/17    Location/Facility Name/Address/Phone #:   [facility] [address]  [place] [address]  Phone: [phone]    Location / Facility NPI: [id number]    [facility] Phone # To Reach The Patient: [phone]    Please enter Ambulatory referral first and then send to Procedure Prior Auth for insurance referral, thank you.

## 2024-10-03 DIAGNOSIS — E03.9 ACQUIRED HYPOTHYROIDISM: ICD-10-CM

## 2024-10-03 DIAGNOSIS — F33.0 MILD EPISODE OF RECURRENT MAJOR DEPRESSIVE DISORDER (HCC): ICD-10-CM

## 2024-10-03 RX ORDER — CITALOPRAM HYDROBROMIDE 10 MG/1
10 TABLET ORAL DAILY
Qty: 90 TABLET | Refills: 1 | Status: SHIPPED | OUTPATIENT
Start: 2024-10-03

## 2024-10-03 RX ORDER — LEVOTHYROXINE SODIUM 75 UG/1
TABLET ORAL
Qty: 32 TABLET | Refills: 5 | Status: SHIPPED | OUTPATIENT
Start: 2024-10-03

## 2024-12-09 DIAGNOSIS — F33.0 MILD EPISODE OF RECURRENT MAJOR DEPRESSIVE DISORDER (HCC): ICD-10-CM

## 2024-12-09 DIAGNOSIS — E03.9 ACQUIRED HYPOTHYROIDISM: ICD-10-CM

## 2024-12-11 RX ORDER — CITALOPRAM HYDROBROMIDE 10 MG/1
10 TABLET ORAL DAILY
Qty: 90 TABLET | Refills: 1 | Status: SHIPPED | OUTPATIENT
Start: 2024-12-11

## 2024-12-11 RX ORDER — LEVOTHYROXINE SODIUM 75 UG/1
TABLET ORAL
Qty: 32 TABLET | Refills: 5 | Status: SHIPPED | OUTPATIENT
Start: 2024-12-11

## 2025-01-01 ENCOUNTER — OFFICE VISIT (OUTPATIENT)
Dept: URGENT CARE | Facility: CLINIC | Age: 35
End: 2025-01-01
Payer: COMMERCIAL

## 2025-01-01 VITALS
OXYGEN SATURATION: 99 % | HEART RATE: 68 BPM | TEMPERATURE: 97.2 F | BODY MASS INDEX: 41.19 KG/M2 | RESPIRATION RATE: 18 BRPM | SYSTOLIC BLOOD PRESSURE: 120 MMHG | WEIGHT: 255.2 LBS | DIASTOLIC BLOOD PRESSURE: 78 MMHG

## 2025-01-01 DIAGNOSIS — L03.031 PARONYCHIA OF GREAT TOE OF RIGHT FOOT: Primary | ICD-10-CM

## 2025-01-01 PROCEDURE — 99213 OFFICE O/P EST LOW 20 MIN: CPT | Performed by: PHYSICIAN ASSISTANT

## 2025-01-01 NOTE — PROGRESS NOTES
[facility]        NAME: Susana Burns is a 34 y.o. female  : 1990    MRN: [id number]  DATE: 2025  TIME: 9:23 AM    Assessment and Plan   Paronychia of great toe of right foot [L03.031]  1. Paronychia of great toe of right foot  cephalexin (KEFLEX) 250 mg capsule            Patient Instructions       Follow up with PCP in 3-5 days.  Proceed to  ER if symptoms worsen.    If tests have been performed at [facility], our office will contact you with results if changes need to be made to the care plan discussed with you at the visit.  [facility]    Chief Complaint     Chief Complaint   Patient presents with    Toe Pain     Started a week ago with Right great toe swelling and pain, reports some pus like drainage, tried episome salt baths, peroxide and neosporin with minimal relief         History of Present Illness       Patient presents with right great toe pain, redness and swelling for the past week.  Did have some purulent drainage and it improved a little at that time but otherwise still persists.  Denies fevers, injury to the area, bruising, numbness or tingling.    Toe Pain   Pertinent negatives include no numbness.       Review of Systems   Review of Systems   Constitutional:  Negative for fever.   Musculoskeletal:  Positive for joint swelling. Negative for arthralgias and myalgias.   Skin:  Positive for color change.   Neurological:  Negative for weakness and numbness.         Current Medications       Current Outpatient Medications:     cephalexin (KEFLEX) 250 mg capsule, Take 1 capsule (250 mg total) by mouth 4 (four) times a day for 7 days, Disp: 28 capsule, Rfl: 0    citalopram (CeleXA) 10 mg tablet, Take 1 tablet (10 mg total) by mouth daily, Disp: 90 tablet, Rfl: 1    levothyroxine 75 mcg tablet, Take 1 tablet 6 days a week and 2 tablets on ., Disp: 32 tablet, Rfl: 5    Prenat MV-Min w/Fe-Folate-DHA (PRENATAL COMPLETE PO),  Take by mouth, Disp: , Rfl:     Current Allergies     Allergies as of 01/01/2025    (No Known Allergies)            The following portions of the patient's history were reviewed and updated as appropriate: allergies, current medications, past family history, past medical history, past social history, past surgical history and problem list.     Past Medical History:   Diagnosis Date    Hypothyroidism     PCOS (polycystic ovarian syndrome)        History reviewed. No pertinent surgical history.    Family History   Problem Relation Age of Onset    Thyroid disease unspecified Mother     Heart disease Mother     Stroke Mother     Hyperthyroidism Mother     No Known Problems Father     Diabetes Brother     Colon cancer Cousin     Breast cancer Neg Hx     Ovarian cancer Neg Hx     Uterine cancer Neg Hx          Medications have been verified.        Objective   /78 (BP Location: Right arm, Patient Position: Sitting)   Pulse 68   Temp (!) 97.2 °F (36.2 °C) (Tympanic)   Resp 18   Wt 116 kg (255 lb 3.2 oz)   LMP 11/23/2024   SpO2 99%   Breastfeeding Yes   BMI 41.19 kg/m²   Patient's last menstrual period was 11/23/2024.       Physical Exam     Physical Exam  Constitutional:       Appearance: Normal appearance.   Musculoskeletal:         General: Swelling and tenderness present. Normal range of motion.   Skin:     General: Skin is warm.      Findings: No bruising.      Comments: Mild erythema and swelling over the lateral side of the nail plate of the right great toe noted.  No drainage expressible from the area.  No fluctuance.   Neurological:      Mental Status: She is alert.   Psychiatric:         Mood and Affect: Mood normal.         Behavior: Behavior normal.

## 2025-02-02 DIAGNOSIS — E03.9 ACQUIRED HYPOTHYROIDISM: ICD-10-CM

## 2025-02-03 RX ORDER — LEVOTHYROXINE SODIUM 75 UG/1
TABLET ORAL
Qty: 96 TABLET | Refills: 1 | Status: SHIPPED | OUTPATIENT
Start: 2025-02-03

## 2025-02-28 DIAGNOSIS — E03.9 ACQUIRED HYPOTHYROIDISM: ICD-10-CM

## 2025-02-28 RX ORDER — LEVOTHYROXINE SODIUM 75 UG/1
TABLET ORAL
Qty: 96 TABLET | Refills: 1 | Status: SHIPPED | OUTPATIENT
Start: 2025-02-28

## 2025-03-04 ENCOUNTER — OFFICE VISIT (OUTPATIENT)
Dept: URGENT CARE | Facility: CLINIC | Age: 35
End: 2025-03-04
Payer: COMMERCIAL

## 2025-03-04 VITALS
OXYGEN SATURATION: 98 % | RESPIRATION RATE: 16 BRPM | HEART RATE: 65 BPM | SYSTOLIC BLOOD PRESSURE: 104 MMHG | BODY MASS INDEX: 39.7 KG/M2 | TEMPERATURE: 97.9 F | WEIGHT: 247 LBS | DIASTOLIC BLOOD PRESSURE: 62 MMHG | HEIGHT: 66 IN

## 2025-03-04 DIAGNOSIS — H10.231 SEROUS CONJUNCTIVITIS OF RIGHT EYE: Primary | ICD-10-CM

## 2025-03-04 PROCEDURE — 99213 OFFICE O/P EST LOW 20 MIN: CPT | Performed by: FAMILY MEDICINE

## 2025-03-04 RX ORDER — OFLOXACIN 3 MG/ML
1 SOLUTION/ DROPS OPHTHALMIC 4 TIMES DAILY
Qty: 5 ML | Refills: 0 | Status: SHIPPED | OUTPATIENT
Start: 2025-03-04 | End: 2025-03-13

## 2025-03-04 NOTE — PROGRESS NOTES
[facility]        NAME: Susana Burns is a 34 y.o. female  : 1990    MRN: [id number]  DATE: 2025  TIME: 10:10 AM    Assessment and Plan   Serous conjunctivitis of right eye [H10.231]  1. Serous conjunctivitis of right eye  ofloxacin (OCUFLOX) 0.3 % ophthalmic solution            Patient Instructions       Follow up with PCP in 3-5 days.  Proceed to  ER if symptoms worsen.    If tests have been performed at [facility], our office will contact you with results if changes need to be made to the care plan discussed with you at the visit.  You can review your full results on [facility].    Chief Complaint     Chief Complaint   Patient presents with    Conjunctivitis     Pt presents with right eye irritation, purulent discharge x this morning.  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           History of Present Illness       34-year-old female reports waking up this morning with redness and discharge from her right eye.        Review of Systems   Review of Systems   Constitutional: Negative.    HENT: Negative.     Eyes:  Positive for redness and itching.   Respiratory: Negative.     Cardiovascular: Negative.    Gastrointestinal: Negative.    Genitourinary: Negative.    Musculoskeletal: Negative.    Skin: Negative.    Allergic/Immunologic: Negative.    Neurological: Negative.    Hematological: Negative.    Psychiatric/Behavioral: Negative.           Current Medications       Current Outpatient Medications:     citalopram (CeleXA) 10 mg tablet, Take 1 tablet (10 mg total) by mouth daily, Disp: 90 tablet, Rfl: 1    levothyroxine 75 mcg tablet, TAKE 1 TABLET 6 DAYS A WEEK AND 2 TABLETS ON ., Disp: 96 tablet, Rfl: 1    ofloxacin (OCUFLOX) 0.3 % ophthalmic solution, Administer 1 drop into the left eye 4 (four) times a day, Disp: 5 mL, Rfl: 0    Prenat MV-Min w/Fe-Folate-DHA (PRENATAL COMPLETE PO), Take by mouth, Disp: , Rfl:     Current Allergies     Allergies as of 2025    (No  "Known Allergies)            The following portions of the patient's history were reviewed and updated as appropriate: allergies, current medications, past family history, past medical history, past social history, past surgical history and problem list.     Past Medical History:   Diagnosis Date    Hypothyroidism     PCOS (polycystic ovarian syndrome)        History reviewed. No pertinent surgical history.    Family History   Problem Relation Age of Onset    Thyroid disease unspecified Mother     Heart disease Mother     Stroke Mother     Hyperthyroidism Mother     No Known Problems Father     Diabetes Brother     Colon cancer Cousin     Breast cancer Neg Hx     Ovarian cancer Neg Hx     Uterine cancer Neg Hx          Medications have been verified.        Objective   /62   Pulse 65   Temp 97.9 °F (36.6 °C)   Resp 16   Ht 5' 6\" (1.676 m)   Wt 112 kg (247 lb)   SpO2 98%   BMI 39.87 kg/m²   No LMP recorded.       Physical Exam     Physical Exam  Vitals and nursing note reviewed.   Constitutional:       Appearance: She is well-developed.   HENT:      Head: Normocephalic.      Nose: Nose normal.   Eyes:      General:         Right eye: Discharge present.      Pupils: Pupils are equal, round, and reactive to light.   Cardiovascular:      Rate and Rhythm: Normal rate and regular rhythm.   Pulmonary:      Effort: Pulmonary effort is normal.   Abdominal:      General: Abdomen is flat.   Musculoskeletal:         General: Normal range of motion.      Cervical back: Normal range of motion.   Skin:     General: Skin is warm and dry.   Neurological:      Mental Status: She is alert and oriented to person, place, and time.                   "

## 2025-03-06 ENCOUNTER — OFFICE VISIT (OUTPATIENT)
Dept: ENDOCRINOLOGY | Facility: HOSPITAL | Age: 35
End: 2025-03-06
Payer: COMMERCIAL

## 2025-03-06 VITALS
DIASTOLIC BLOOD PRESSURE: 74 MMHG | WEIGHT: 250 LBS | SYSTOLIC BLOOD PRESSURE: 100 MMHG | BODY MASS INDEX: 40.18 KG/M2 | HEIGHT: 66 IN | HEART RATE: 68 BPM

## 2025-03-06 DIAGNOSIS — E06.3 HYPOTHYROIDISM DUE TO HASHIMOTO THYROIDITIS: ICD-10-CM

## 2025-03-06 DIAGNOSIS — E28.2 PCOS (POLYCYSTIC OVARIAN SYNDROME): Primary | ICD-10-CM

## 2025-03-06 LAB
ALBUMIN SERPL-MCNC: 4.4 G/DL (ref 3.9–4.9)
ALP SERPL-CCNC: 111 IU/L (ref 44–121)
ALT SERPL-CCNC: 17 IU/L (ref 0–32)
AST SERPL-CCNC: 16 IU/L (ref 0–40)
BILIRUB SERPL-MCNC: 0.4 MG/DL (ref 0–1.2)
BUN SERPL-MCNC: 16 MG/DL (ref 6–20)
BUN/CREAT SERPL: 19 (ref 9–23)
CALCIUM SERPL-MCNC: 8.8 MG/DL (ref 8.7–10.2)
CHLORIDE SERPL-SCNC: 106 MMOL/L (ref 96–106)
CHOLEST SERPL-MCNC: 152 MG/DL (ref 100–199)
CHOLEST/HDLC SERPL: 3.1 RATIO (ref 0–4.4)
CO2 SERPL-SCNC: 21 MMOL/L (ref 20–29)
CREAT SERPL-MCNC: 0.83 MG/DL (ref 0.57–1)
EGFR: 95 ML/MIN/1.73
EST. AVERAGE GLUCOSE BLD GHB EST-MCNC: 114 MG/DL
GLOBULIN SER-MCNC: 2.6 G/DL (ref 1.5–4.5)
GLUCOSE SERPL-MCNC: 91 MG/DL (ref 70–99)
HBA1C MFR BLD: 5.6 % (ref 4.8–5.6)
HDLC SERPL-MCNC: 49 MG/DL
LDLC SERPL CALC-MCNC: 80 MG/DL (ref 0–99)
POTASSIUM SERPL-SCNC: 4.8 MMOL/L (ref 3.5–5.2)
PROT SERPL-MCNC: 7 G/DL (ref 6–8.5)
SL AMB VLDL CHOLESTEROL CALC: 23 MG/DL (ref 5–40)
SODIUM SERPL-SCNC: 139 MMOL/L (ref 134–144)
T4 FREE SERPL-MCNC: 0.92 NG/DL (ref 0.82–1.77)
TRIGL SERPL-MCNC: 133 MG/DL (ref 0–149)
TSH SERPL DL<=0.005 MIU/L-ACNC: 5.27 UIU/ML (ref 0.45–4.5)

## 2025-03-06 PROCEDURE — 99214 OFFICE O/P EST MOD 30 MIN: CPT | Performed by: STUDENT IN AN ORGANIZED HEALTH CARE EDUCATION/TRAINING PROGRAM

## 2025-03-06 RX ORDER — PROGESTERONE 50 MG/ML
5 INJECTION, SOLUTION INTRAMUSCULAR
COMMUNITY
Start: 2025-02-10

## 2025-03-06 RX ORDER — ESTRADIOL 2 MG/1
2 TABLET ORAL DAILY
COMMUNITY
Start: 2025-02-10

## 2025-03-06 RX ORDER — NORETHINDRONE AND ETHINYL ESTRADIOL 1 MG-35MCG
1 KIT ORAL DAILY
COMMUNITY
Start: 2025-02-10

## 2025-03-06 NOTE — PROGRESS NOTES
Established Patient Progress Note       No chief complaint on file.     History of Present Illness:     Susana Burns is a 34 y.o. female with a history of PCOS, hypothyroidism who presents today for follow up. Last visit 08/24 for hypothyroid during pregnancy management. She's now post partum and presents for routine follow up    Hypothyroidism:- Patient was dx 4-5 years ago and started on low dose levothyroxine and slowly dose escalated to 75mcg daily. Last dose adjustment increased dose to 75mcg 6 days a week and 2 tab on Sunday when got pregnant. Remains on this dose still. Most recent labs 03/25 TSH 5.2, T4 0.92.  reports taking her medications inconsistently sometimes, denies any Supplements. Is on OCP now to prep her body for IVF. Reports transfer day was pushed ahead to april    PCOS:- Hx of irregular menses since past couple of years. Indicates menses were more regular when was on vegan diet, with mild hirsutism with facial acne and hair growth primarily on face. Workup in past normal DHEA-s, Prolactin, Thyroid, 17 hydroxyprogrestrone with mildly elevated testosterone at 50 and recent US pelvis finding of multiple hemorrhagic cysts in b/l ovaries. Based on this Dx with PCOS.   Currently menses:- stopped breast feeding 1 month ago, started OCP for IVF.   Symptoms of hyperandrogenism- Never tried on aldactone  Fertility-  [facility]   Last HbA1C:- 3/25 5.6%, Last Lipid:- 3/25 normal.    Social Hx:- Quit smoking Mook, rare alcohol use, no other drug use, works as a behaviour health therapist  Family Hx:- mother and sister have hypothyroidism     Patient Active Problem List   Diagnosis    Hypothyroidism    Mild episode of recurrent major depressive disorder (HCC)    Obesity (BMI 30.0-34.9)    PCOS (polycystic ovarian syndrome)    Serous conjunctivitis of right eye      Past Medical History:   Diagnosis Date    Hypothyroidism     PCOS (polycystic ovarian  syndrome)       No past surgical history on file.   Family History   Problem Relation Age of Onset    Thyroid disease unspecified Mother     Heart disease Mother     Stroke Mother     Hyperthyroidism Mother     No Known Problems Father     Diabetes Brother     Colon cancer Cousin     Breast cancer Neg Hx     Ovarian cancer Neg Hx     Uterine cancer Neg Hx      Social History     Tobacco Use    Smoking status: Former     Current packs/day: 0.00     Types: Cigarettes     Quit date: 3/17/2022     Years since quittin.9     Passive exposure: Never    Smokeless tobacco: Never    Tobacco comments:     2 cig a day since age 13   Substance Use Topics    Alcohol use: Not Currently     No Known Allergies    Current Outpatient Medications:     citalopram (CeleXA) 10 mg tablet, Take 1 tablet (10 mg total) by mouth daily, Disp: 90 tablet, Rfl: 1    levothyroxine 75 mcg tablet, TAKE 1 TABLET 6 DAYS A WEEK AND 2 TABLETS ON ., Disp: 96 tablet, Rfl: 1    ofloxacin (OCUFLOX) 0.3 % ophthalmic solution, Administer 1 drop into the left eye 4 (four) times a day, Disp: 5 mL, Rfl: 0    Prenat MV-Min w/Fe-Folate-DHA (PRENATAL COMPLETE PO), Take by mouth, Disp: , Rfl:     Review of Systems   Constitutional:  Positive for fatigue. Negative for unexpected weight change.   HENT:  Negative for trouble swallowing and voice change.    Eyes:  Negative for photophobia and visual disturbance.   Respiratory:  Negative for choking and shortness of breath.    Gastrointestinal:  Positive for constipation. Negative for diarrhea.   Endocrine: Negative for cold intolerance and heat intolerance.   Musculoskeletal:  Negative for arthralgias and myalgias.   Skin:  Negative for rash.       Physical Exam:  There is no height or weight on file to calculate BMI.  There were no vitals taken for this visit.   Wt Readings from Last 3 Encounters:   25 112 kg (247 lb)   25 116 kg (255 lb 3.2 oz)   24 110 kg (243 lb)       Physical  Exam  Constitutional:       Appearance: Normal appearance. She is obese.   Cardiovascular:      Rate and Rhythm: Normal rate and regular rhythm.      Pulses: Normal pulses.   Pulmonary:      Effort: Pulmonary effort is normal.   Abdominal:      General: Abdomen is flat. Bowel sounds are normal.      Palpations: Abdomen is soft.   Skin:     General: Skin is warm and dry.      Capillary Refill: Capillary refill takes less than 2 seconds.   Neurological:      General: No focal deficit present.      Mental Status: She is alert and oriented to person, place, and time.   Psychiatric:         Mood and Affect: Mood normal.            Latest Reference Range & Units 01/29/24 13:28 01/29/24 13:29 03/26/24 10:50 07/25/24 11:42 03/05/25 08:38   Hemoglobin A1C 4.8 - 5.6 %     5.6   eAG, EST AVG Glucose mg/dL     114   GLUCOSE 1 HR 50 GM GLUC CHALLENGE-PREG PTS 70 - 139 mg/dL 112       CALCIUM 8.7 - 10.2 mg/dL     8.8   TSH, POC 0.450 - 4.500 uIU/mL  1.020 1.170 <0.005 (L) 5.270 (H)   FREE T4 0.82 - 1.77 ng/dL  1.14 1.06 1.59 0.92   (L): Data is abnormally low  (H): Data is abnormally high      US pelvis   PELVIC ULTRASOUND, COMPLETE     INDICATION:  abnormal ovaries on exam.  As per review of electronic medical record,  patient with history of PCOS status post IVF egg retrieval on 2/23/2023 presenting with left lower quadrant pain.     COMPARISON: Pelvic ultrasound from 3/21/2022 and CT of the abdomen and pelvis from earlier today.     TECHNIQUE:   Transabdominal pelvic ultrasound was performed in sagittal and transverse planes with a curvilinear transducer.  Additional transvaginal imaging was performed to better evaluate the endometrium and ovaries.  Imaging included volumetric   sweeps as well as traditional still imaging technique.     FINDINGS:      UTERUS:  The uterus is anteverted in position and normal in size, measuring 7.6 x 3.4 x 4.6 cm.   Contour and echotexture appear normal.  The cervix shows no suspicious  abnormality.     ENDOMETRIUM:    Normal caliber of 7 mm.   Homogeneous and normal in appearance.     OVARIES/ADNEXA:  The right ovary measures 7.5 x 4.5 x 4.6 cm, with a volume of 81.1 mL.  The left ovary measures 4.3 x 3.8 x 4.9 cm, with a volume of 42.5 mL.  The ovarian volume is increased compared to the prior study from March 2022 where the right ovary had a volume of 10.8 mL in the left ovary had a volume of 9.3 mL.  Multiple hemorrhagic follicles are seen in both ovaries.  Doppler flow within normal limits.     Small amount of free pelvic fluid is seen in the pelvis containing low level internal echoes, likely representing blood products.  No organized fluid collections.     IMPRESSION:     Both ovaries are enlarged, right greater than left, and contain multiple hemorrhagic follicles.  Although this appearance may be secondary to the underlying polycystic ovarian syndrome, the ovaries are significantly enlarged compared to March 2022.    Ovarian hyperstimulation syndrome (OHSS) is possible, although the ovaries are not as large as typical of OHSS.     No evidence of ovarian torsion.     Small amount of free pelvic fluid in the pelvis containing low level internal echoes, likely representing blood products, however infected ascites is not excluded.  No organized collections.     Unremarkable sonographic appearance of the uterus.     The study was marked in EPIC for immediate notification.    Impression & Plan:    Problem List Items Addressed This Visit    None          No orders of the defined types were placed in this encounter.      There are no Patient Instructions on file for this visit.    Patient is a 32y F with subclinical hypothyroidism and PCOS who presents today for follow up    1) Hypothyroidism:- Patients thyroid control was slightly labile immediately post partum with low TSH and high T4 but clinically felt fine and hence we didn't adjust her dose. Remains on intrapartum dose 75mcg 6 days a week  and 2 tab on Sunday. Prepregnancy 75mcg daily. Currently TSH is higher than goal given she is trying to conceive with goal <2.5. higher TSH could be d/t inconsistency in dosing bu did discuss given she's going through IVF would delay her transfer until TSH <2.5. recommend taking 75mcg 6 days and 2 tab sun consistently for a month and repeatin TFT then. If tsh still not goal by then will increase her dose.   Check TFT every month in 1st trimester and once 2/3rd trimester    2) PCOS:-   Menses- on ocp for IVF tx  Hyperandrogenism- mild symptoms, non concerning, hold off on aldactone for now until done with second pregnancy  Fertility- going back to [facility] in may 2025. Has frozen embroyo  Uptodate with metabolic workup, repeat HbA1C and Lipid in 1 year. 3/26    RTC in 3-4 months      Discussed with the patient and all questioned fully answered. She will call me if any problems arise.      Counseled patient on diagnostic results, prognosis, risk and benefit of treatment options, instruction for management, importance of treatment compliance, Risk  factor reduction and impressions      Nanda Ocampo MD

## 2025-03-07 ENCOUNTER — TELEPHONE (OUTPATIENT)
Age: 35
End: 2025-03-07

## 2025-03-07 DIAGNOSIS — E28.9 DISORDER OF ENDOCRINE OVARY: Primary | ICD-10-CM

## 2025-03-07 NOTE — TELEPHONE ENCOUNTER
Patient is requesting an insurance referral for the following specialty:      Test Name / Order Name:     DX Code: E28.9    Date Of Service: Does not know yet    Location/Facility Name/Address/Phone #: [facility] Does have have #  in [place]    Location / Facility NPI: [id number]    Best Phone # To Reach The Patient:  [phone] .

## 2025-03-13 ENCOUNTER — OFFICE VISIT (OUTPATIENT)
Dept: FAMILY MEDICINE CLINIC | Facility: HOSPITAL | Age: 35
End: 2025-03-13
Payer: COMMERCIAL

## 2025-03-13 VITALS
BODY MASS INDEX: 39.73 KG/M2 | OXYGEN SATURATION: 99 % | SYSTOLIC BLOOD PRESSURE: 99 MMHG | HEART RATE: 68 BPM | TEMPERATURE: 97.9 F | DIASTOLIC BLOOD PRESSURE: 75 MMHG | WEIGHT: 247.2 LBS | HEIGHT: 66 IN

## 2025-03-13 DIAGNOSIS — R06.83 SNORING: ICD-10-CM

## 2025-03-13 DIAGNOSIS — N97.9 INFERTILITY, FEMALE: Primary | ICD-10-CM

## 2025-03-13 DIAGNOSIS — F33.0 MILD EPISODE OF RECURRENT MAJOR DEPRESSIVE DISORDER (HCC): ICD-10-CM

## 2025-03-13 DIAGNOSIS — E66.9 OBESITY (BMI 30-39.9): ICD-10-CM

## 2025-03-13 PROCEDURE — 99214 OFFICE O/P EST MOD 30 MIN: CPT | Performed by: INTERNAL MEDICINE

## 2025-03-13 RX ORDER — CITALOPRAM HYDROBROMIDE 10 MG/1
10 TABLET ORAL DAILY
Qty: 90 TABLET | Refills: 1 | Status: SHIPPED | OUTPATIENT
Start: 2025-03-13

## 2025-03-13 NOTE — PROGRESS NOTES
Name: Susana Burns      : 1990      MRN: [id number]  Encounter Provider: Rere Srinivasan DO  Encounter Date: 3/13/2025   Encounter department: [facility] [address]   :  Assessment & Plan  Infertility, female  Now back with [facility] and had polyps removed yesterday, follows with Endo for her PCOS as well, con't tx and f/u as per specialists       Snoring  Was told she need eval for WILLIE during procedure by anesthesia, she snores loudly - unsure if she had apneas, has woken up gasping for air and never feels rested upon awakening, she has some daytime sleepiness but it is mild and not limiting, SE of untx WILLIE reviewed, healthy diet/regular formal exercise encouraged, will start with home sleep study and treat accordingly  Orders:    Ambulatory Referral to Sleep Medicine; Future    Obesity (BMI 30-39.9)    Healthy diet/regular exercise/healthy wgt encouraged  Orders:    Ambulatory Referral to Sleep Medicine; Future    Mild episode of recurrent major depressive disorder (HCC)  Ran out of rx a few mos ago and wishes to restart Celexa d/t mood swings, rx sent, call with new/worse/persistent mood issues otherwise will re-eval in 6 mos  Orders:    citalopram (CeleXA) 10 mg tablet; Take 1 tablet (10 mg total) by mouth daily          Depression Screening and Follow-up Plan: Patient was screened for depression during today's encounter. They screened negative with a PHQ-9 score of 0.      PAP  - Dr. Domenica CABRERA 3/25      History of Present Illness   HPI Pt here for follow up appt    Pt has been seeing fertility office ([facility]) as they are trying for baby number 2. She had a saline study done and told she had polyps.  She had a procedure yesterday for endometrial polyps (? Hysteroscopy).  She was told she should have a sleep study for severe snoring during the procedure.  She has had no witnessed apnea that she knows. She has woke up gasping for air. She never feels  "rested upon awakening and does feel tired all day.  She notes no frequent HA's but has some issues with concentration at times.  She has never had a sleep study.     Pt is taking her Celexa daily as directed w/o SE.  She notes no Se with the medication. She ran out of the medication a few mos ago and feels the mood is worse and wants to restart it.  She notes mood swings off the rx.     Recent labs by Endo reviewed with pt briefly. She admits she was forgetting to take her levothyroxine a lot.      Review of Systems   Constitutional:  Positive for fatigue. Negative for chills, fever and unexpected weight change.   HENT:  Negative for congestion and sore throat.    Eyes:  Negative for pain and visual disturbance.   Respiratory:  Negative for cough, shortness of breath and wheezing.    Cardiovascular:  Negative for chest pain and palpitations.   Gastrointestinal:  Negative for abdominal pain, constipation, diarrhea and nausea.   Genitourinary:  Negative for difficulty urinating and dysuria.   Musculoskeletal:  Positive for arthralgias. Negative for gait problem.   Skin:  Negative for rash and wound.   Neurological:  Negative for dizziness, light-headedness and headaches.   Hematological:  Does not bruise/bleed easily.   Psychiatric/Behavioral:  Positive for dysphoric mood and sleep disturbance. The patient is nervous/anxious.        Objective   BP 99/75 (BP Location: Left arm, Patient Position: Sitting, Cuff Size: Large)   Pulse 68   Temp 97.9 °F (36.6 °C)   Ht 5' 6\" (1.676 m)   Wt 112 kg (247 lb 3.2 oz)   SpO2 99%   BMI 39.90 kg/m²      Physical Exam  Vitals and nursing note reviewed.   Constitutional:       General: She is not in acute distress.     Appearance: She is well-developed. She is not ill-appearing.   HENT:      Head: Normocephalic and atraumatic.      Right Ear: External ear normal.      Left Ear: External ear normal.   Eyes:      General:         Right eye: No discharge.         Left eye: No " discharge.      Conjunctiva/sclera: Conjunctivae normal.   Neck:      Thyroid: No thyromegaly.      Trachea: No tracheal deviation.   Cardiovascular:      Rate and Rhythm: Normal rate and regular rhythm.      Heart sounds: Normal heart sounds. No murmur heard.  Pulmonary:      Effort: Pulmonary effort is normal. No respiratory distress.      Breath sounds: Normal breath sounds. No wheezing, rhonchi or rales.   Abdominal:      General: There is no distension.      Palpations: Abdomen is soft.      Tenderness: There is no abdominal tenderness. There is no guarding or rebound.   Musculoskeletal:         General: No deformity or signs of injury.      Cervical back: Neck supple.   Skin:     General: Skin is warm and dry.      Coloration: Skin is not pale.      Findings: No bruising or rash.   Neurological:      General: No focal deficit present.      Mental Status: She is alert. Mental status is at baseline.      Motor: No abnormal muscle tone.      Gait: Gait normal.   Psychiatric:         Mood and Affect: Mood normal.         Behavior: Behavior normal.         Thought Content: Thought content normal.         Judgment: Judgment normal.

## 2025-03-13 NOTE — ASSESSMENT & PLAN NOTE
Ran out of rx a few mos ago and wishes to restart Celexa d/t mood swings, rx sent, call with new/worse/persistent mood issues otherwise will re-eval in 6 mos  Orders:    citalopram (CeleXA) 10 mg tablet; Take 1 tablet (10 mg total) by mouth daily

## 2025-03-17 ENCOUNTER — TRANSCRIBE ORDERS (OUTPATIENT)
Dept: SLEEP CENTER | Facility: CLINIC | Age: 35
End: 2025-03-17

## 2025-03-17 DIAGNOSIS — E66.811 OBESITY (BMI 30.0-34.9): Primary | ICD-10-CM

## 2025-03-17 DIAGNOSIS — R06.83 SNORING: ICD-10-CM

## 2025-03-26 DIAGNOSIS — E03.9 ACQUIRED HYPOTHYROIDISM: ICD-10-CM

## 2025-03-27 ENCOUNTER — RESULTS FOLLOW-UP (OUTPATIENT)
Dept: ENDOCRINOLOGY | Facility: HOSPITAL | Age: 35
End: 2025-03-27

## 2025-03-27 LAB
T4 FREE SERPL-MCNC: 1.36 NG/DL (ref 0.82–1.77)
TSH SERPL DL<=0.005 MIU/L-ACNC: 2.94 UIU/ML (ref 0.45–4.5)

## 2025-03-27 RX ORDER — LEVOTHYROXINE SODIUM 75 UG/1
TABLET ORAL
Qty: 102 TABLET | Refills: 1 | Status: SHIPPED | OUTPATIENT
Start: 2025-03-27

## 2025-04-03 PROBLEM — H10.231 SEROUS CONJUNCTIVITIS OF RIGHT EYE: Status: RESOLVED | Noted: 2025-03-04 | Resolved: 2025-04-03

## 2025-05-02 ENCOUNTER — VBI (OUTPATIENT)
Dept: ADMINISTRATIVE | Facility: OTHER | Age: 35
End: 2025-05-02

## 2025-05-02 NOTE — TELEPHONE ENCOUNTER
05/02/25 9:06 AM     Chart reviewed for Pap Smear (HPV) aka Cervical Cancer Screening was/were submitted to the patient's insurance.     Jennifer Bell MA   [facility]

## 2025-05-04 DIAGNOSIS — E03.9 ACQUIRED HYPOTHYROIDISM: ICD-10-CM

## 2025-05-05 RX ORDER — LEVOTHYROXINE SODIUM 75 UG/1
TABLET ORAL
Qty: 102 TABLET | Refills: 1 | Status: SHIPPED | OUTPATIENT
Start: 2025-05-05

## 2025-05-27 DIAGNOSIS — E03.9 ACQUIRED HYPOTHYROIDISM: ICD-10-CM

## 2025-05-28 RX ORDER — LEVOTHYROXINE SODIUM 75 UG/1
TABLET ORAL
Qty: 102 TABLET | Refills: 2 | OUTPATIENT
Start: 2025-05-28

## 2025-06-03 RX ORDER — ESTRADIOL 2 MG/1
2 TABLET ORAL DAILY
Refills: 0 | OUTPATIENT
Start: 2025-06-03

## 2025-06-13 ENCOUNTER — OFFICE VISIT (OUTPATIENT)
Dept: ENDOCRINOLOGY | Facility: HOSPITAL | Age: 35
End: 2025-06-13
Payer: COMMERCIAL

## 2025-06-13 VITALS
HEIGHT: 66 IN | WEIGHT: 234 LBS | BODY MASS INDEX: 37.61 KG/M2 | OXYGEN SATURATION: 97 % | DIASTOLIC BLOOD PRESSURE: 86 MMHG | HEART RATE: 74 BPM | SYSTOLIC BLOOD PRESSURE: 120 MMHG

## 2025-06-13 DIAGNOSIS — E03.9 ACQUIRED HYPOTHYROIDISM: ICD-10-CM

## 2025-06-13 DIAGNOSIS — E03.9 HYPOTHYROIDISM DURING PREGNANCY IN FIRST TRIMESTER: ICD-10-CM

## 2025-06-13 DIAGNOSIS — E06.3 HYPOTHYROIDISM DUE TO HASHIMOTO THYROIDITIS: Primary | ICD-10-CM

## 2025-06-13 DIAGNOSIS — O99.281 HYPOTHYROIDISM DURING PREGNANCY IN FIRST TRIMESTER: ICD-10-CM

## 2025-06-13 PROCEDURE — 99214 OFFICE O/P EST MOD 30 MIN: CPT | Performed by: STUDENT IN AN ORGANIZED HEALTH CARE EDUCATION/TRAINING PROGRAM

## 2025-06-13 RX ORDER — LEVOTHYROXINE SODIUM 75 UG/1
TABLET ORAL
Qty: 102 TABLET | Refills: 1 | Status: SHIPPED | OUTPATIENT
Start: 2025-06-13

## 2025-06-13 NOTE — PROGRESS NOTES
Name: Susana Burns      : 1990      MRN: [id number]  Encounter Provider: Nanda Ocampo MD  Encounter Date: 2025   Encounter department: [facility] [place]    No chief complaint on file.  :  Assessment & Plan    Patient is a 32y F with subclinical hypothyroidism and PCOS who presents today for follow up     1) Hypothyroidism:- Patients thyroid control was slightly labile immediately post partum with low TSH and high T4 but clinically felt fine and hence we didn't adjust her dose.  Prepregnancy 75mcg daily, we adjusted her dose last time to optimize her for IVF cycle. She is currently pregnant with her second child. Her most recent labs show TSH 2.2 with T4 1.23. discussed this is very close to goal but given she is in her first trimester will try to aim TSH close to 2.0. Recommend doing levothyroxine 75mcg 5 days a week, 1.5 sat and 2 on sun and repeat labs in 4 weeks. Check thyroid labs every 4 weeks first trimester, once in 2 and once in 3rd trimester     2) PCOS:-   Menses- pregnant!  Hyperandrogenism- mild symptoms, non concerning  Fertility- Currently pregnant through IVF x2  Uptodate with metabolic workup, repeat HbA1C and Lipid in 1 year. 3/26     RTC in 3 months      Pertinent Medical History     History of Present Illness     Susana Burns is a 34 y.o. female with a history of PCOS, hypothyroidism who presents today for follow up. Last visit  for hypothyroid during pregnancy management. She's now pregnant with second baby, currently in first trimester     Hypothyroidism:- Patient was dx 4-5 years ago and started on low dose levothyroxine and slowly dose escalated to 75mcg daily. Last dose adjustment increased dose to 75mcg 6 days a week and 2 tab on  when got pregnant. Remains on this dose still. Most recent labs  TSH 2.2, T4 1.23.  reports feeling?      PCOS:- Hx of irregular menses since past couple of years. Indicates  "menses were more regular when was on vegan diet, with mild hirsutism with facial acne and hair growth primarily on face. Workup in past normal DHEA-s, Prolactin, Thyroid, 17 hydroxyprogrestrone with mildly elevated testosterone at 50 and recent US pelvis finding of multiple hemorrhagic cysts in b/l ovaries. Based on this Dx with PCOS.   Currently menses:- currently pregnant  Symptoms of hyperandrogenism- Never tried on aldactone  Fertility-  pregnant through IVF x2  Last HbA1C:- 3/25 5.6%, Last Lipid:- 3/25 normal.    Review of Systems   Constitutional:  Negative for fatigue and unexpected weight change.   HENT:  Negative for trouble swallowing and voice change.    Eyes:  Negative for photophobia and visual disturbance.   Respiratory:  Negative for choking and shortness of breath.    Gastrointestinal:  Negative for constipation and diarrhea.   Endocrine: Negative for cold intolerance and heat intolerance.   Musculoskeletal:  Negative for arthralgias and myalgias.   Skin:  Negative for rash.    as per HPI       Medical History Reviewed by provider this encounter:     .    Objective   There were no vitals taken for this visit.     There is no height or weight on file to calculate BMI.  Wt Readings from Last 3 Encounters:   03/13/25 112 kg (247 lb 3.2 oz)   03/06/25 113 kg (250 lb)   03/04/25 112 kg (247 lb)     Physical Exam  Constitutional:       Appearance: Normal appearance. She is obese.     Cardiovascular:      Rate and Rhythm: Normal rate.   Pulmonary:      Effort: Pulmonary effort is normal.   Abdominal:      General: Bowel sounds are normal.      Palpations: Abdomen is soft.     Skin:     General: Skin is warm.      Capillary Refill: Capillary refill takes less than 2 seconds.     Neurological:      General: No focal deficit present.      Mental Status: She is alert.     Psychiatric:         Mood and Affect: Mood normal.       Labs: I have reviewed pertinent labs including: No results found for: \"[id number]\" "   Lab Results   Component Value Date    FREET4 1.23 06/12/2025       Latest Reference Range & Units 07/25/24 11:42 03/05/25 08:38 03/26/25 08:22 06/12/25 09:06   Hemoglobin A1C 4.8 - 5.6 %  5.6     eAG, EST AVG Glucose mg/dL  114     CALCIUM 8.7 - 10.2 mg/dL  8.8     TSH, POC 0.450 - 4.500 uIU/mL <0.005 (L) 5.270 (H) 2.940 2.220   FREE T4 0.82 - 1.77 ng/dL 1.59 0.92 1.36 1.23   (L): Data is abnormally low  (H): Data is abnormally high    There are no Patient Instructions on file for this visit.    Discussed with the patient and all questioned fully answered. She will call me if any problems arise.

## 2025-06-16 ENCOUNTER — TELEPHONE (OUTPATIENT)
Dept: OBGYN CLINIC | Facility: CLINIC | Age: 35
End: 2025-06-16

## 2025-06-16 NOTE — TELEPHONE ENCOUNTER
Patient listed above is an IVF transferred from [facility] [place]. Her transfer date was 5/7/2025, she is currently 8 weeks and 3 days along RICHY 1/23/2026. Patient and partner did get genetic testing done prior to IVF transfer. Ultrasound was done on 6/2/202, 6/9/2025 and 6/16/2025. Patient has no upcoming appointment with them. Patient's release date is June 16, 2025. This is patient's second pregnancy, she is a current patient of [facility]. I inform patient to have [employer] faxed MR prior to her appointment. If you have any additional information, please contact patient, telephone number [phone].    Intake is schedule 7/1/2025 virtual with Nurse  Initial is schedule 7/21/2025 with Dr. Horne

## 2025-07-01 ENCOUNTER — ROUTINE PRENATAL (OUTPATIENT)
Dept: OBGYN CLINIC | Facility: CLINIC | Age: 35
End: 2025-07-01
Payer: COMMERCIAL

## 2025-07-01 ENCOUNTER — INITIAL PRENATAL (OUTPATIENT)
Dept: OBGYN CLINIC | Facility: CLINIC | Age: 35
End: 2025-07-01
Payer: COMMERCIAL

## 2025-07-01 VITALS
BODY MASS INDEX: 37.57 KG/M2 | HEIGHT: 66 IN | WEIGHT: 233.8 LBS | SYSTOLIC BLOOD PRESSURE: 100 MMHG | DIASTOLIC BLOOD PRESSURE: 78 MMHG

## 2025-07-01 VITALS
BODY MASS INDEX: 37.45 KG/M2 | SYSTOLIC BLOOD PRESSURE: 100 MMHG | HEIGHT: 66 IN | WEIGHT: 233 LBS | DIASTOLIC BLOOD PRESSURE: 78 MMHG

## 2025-07-01 DIAGNOSIS — Z36.89 ENCOUNTER FOR OTHER SPECIFIED ANTENATAL SCREENING: Primary | ICD-10-CM

## 2025-07-01 DIAGNOSIS — O20.9 BLEEDING IN EARLY PREGNANCY: Primary | ICD-10-CM

## 2025-07-01 DIAGNOSIS — Z3A.10 10 WEEKS GESTATION OF PREGNANCY: ICD-10-CM

## 2025-07-01 DIAGNOSIS — Z87.42 HISTORY OF PCOS: ICD-10-CM

## 2025-07-01 PROCEDURE — PNV: Performed by: STUDENT IN AN ORGANIZED HEALTH CARE EDUCATION/TRAINING PROGRAM

## 2025-07-01 PROCEDURE — 76817 TRANSVAGINAL US OBSTETRIC: CPT | Performed by: STUDENT IN AN ORGANIZED HEALTH CARE EDUCATION/TRAINING PROGRAM

## 2025-07-01 PROCEDURE — OBC: Performed by: STUDENT IN AN ORGANIZED HEALTH CARE EDUCATION/TRAINING PROGRAM

## 2025-07-01 NOTE — PATIENT INSTRUCTIONS
Congratulation!! Please review our Pregnancy Essential Guide and [facility] L&D Virtual tour from our networks website.     [facility] Pregnancy Essentials Guide  [facility] ([email or web address])     [facility] - Virtual Tour ([employer]

## 2025-07-01 NOTE — PROGRESS NOTES
"Name: Susana Burns      : 1990      MRN: [id number]  Encounter Provider: Ibrahima Horne MD  Encounter Date: 2025   Encounter department: [facility] [place]  :  Assessment & Plan  Bleeding in early pregnancy  - Now resolved. Single live pregnancy identified on ultrasound today. Cervix closed. No evidence of subchorionic hemorrhage. Patient will return for initiation of prenatal care as scheduled on .  Orders:  •  AMB US OB < 14 weeks single or first gestation level 1        History of Present Illness   HPI  uSsana Burns is a 34 y.o. female who presents for evaluation of bright red vaginal bleeding in early pregnancy. This occurred on Friday and has since resolved. Patient is currently 10w4d GA based on date of embryo transfer.   History obtained from: patient    Review of Systems   All other systems reviewed and are negative.    Medical History Reviewed by provider this encounter:  Tobacco  Med Hx  Surg Hx  Fam Hx  Soc Hx    .     Objective   /78 (BP Location: Right arm, Patient Position: Sitting, Cuff Size: Standard)   Ht 5' 6\" (1.676 m)   Wt 106 kg (233 lb)   BMI 37.61 kg/m²        FIRST TRIMESTER OBSTETRIC ULTRASOUND  Date of study: 2025  Performed by: Ibrahima Horne MD     INDICATION: Amenorrhea, viability    COMPARISON: None.     TECHNIQUE:   Transvaginal imaging was performed to assess the gestation, myometrial/endometrial architecture and ovarian parenchymal detail.    The study includes volumetric sweeps and traditional still imaging technique.      FINDINGS:     A single intrauterine gestation is identified.  Cardiac activity is detected at 169 bpm.      YOLK SAC:  Present and normal in size and appearance.  MEAN CROWN RUMP LENGTH:  42.1 mm = 10 weeks 6 days   AMNIOTIC FLUID/SAC SHAPE:  Within expected normal range.     UTERUS/ADNEXA:   No adnexal mass or pathologic cyst.  No free fluid identified.     IMPRESSION:    No LMP " recorded. Patient is pregnant.  Gestational age based on date of embryo transfer: 10w4d  Gestational age based on US: 10w6d    Will assign dating based on date of embryo transfer  Final Gestational age: 10w4d  Final Estimated Date of Delivery: 1/23/26   Fetal cardiac activity detected.  No adnexal masses seen.    Ibrahima Horne MD  7/1/2025 4:50 PM    Physical Exam  Vitals reviewed. Exam conducted with a chaperone present (Betty Price MA).   Constitutional:       Appearance: Normal appearance.     Cardiovascular:      Rate and Rhythm: Normal rate.   Pulmonary:      Effort: Pulmonary effort is normal.   Genitourinary:     Exam position: Lithotomy position.      Labia:         Right: No rash or tenderness.         Left: No rash or tenderness.       Vagina: Normal. No vaginal discharge, erythema, tenderness, bleeding, lesions or prolapsed vaginal walls.      Cervix: Normal. No cervical motion tenderness, discharge, friability, lesion, erythema or cervical bleeding.      Uterus: Normal.       Adnexa: Right adnexa normal and left adnexa normal.        Right: No mass, tenderness or fullness.          Left: No mass, tenderness or fullness.       Neurological:      General: No focal deficit present.      Mental Status: She is alert and oriented to person, place, and time.     Psychiatric:         Mood and Affect: Mood normal.         Behavior: Behavior normal.         Thought Content: Thought content normal.         Judgment: Judgment normal.

## 2025-07-02 ENCOUNTER — TELEPHONE (OUTPATIENT)
Age: 35
End: 2025-07-02

## 2025-07-21 ENCOUNTER — INITIAL PRENATAL (OUTPATIENT)
Dept: OBGYN CLINIC | Facility: CLINIC | Age: 35
End: 2025-07-21

## 2025-07-21 VITALS
DIASTOLIC BLOOD PRESSURE: 64 MMHG | BODY MASS INDEX: 37.12 KG/M2 | SYSTOLIC BLOOD PRESSURE: 108 MMHG | HEIGHT: 66 IN | WEIGHT: 231 LBS

## 2025-07-21 DIAGNOSIS — F32.A DEPRESSION AFFECTING PREGNANCY: ICD-10-CM

## 2025-07-21 DIAGNOSIS — O09.811 PREGNANCY RESULTING FROM IN VITRO FERTILIZATION IN FIRST TRIMESTER: Primary | ICD-10-CM

## 2025-07-21 DIAGNOSIS — E07.9 THYROID DYSFUNCTION IN PREGNANCY: ICD-10-CM

## 2025-07-21 DIAGNOSIS — Z36.89 ENCOUNTER FOR OTHER SPECIFIED ANTENATAL SCREENING: ICD-10-CM

## 2025-07-21 DIAGNOSIS — O99.211 OBESITY AFFECTING PREGNANCY IN FIRST TRIMESTER, UNSPECIFIED OBESITY TYPE: ICD-10-CM

## 2025-07-21 DIAGNOSIS — O09.521 MULTIGRAVIDA OF ADVANCED MATERNAL AGE IN FIRST TRIMESTER: ICD-10-CM

## 2025-07-21 DIAGNOSIS — Z36.1 ANTENATAL SCREENING FOR RAISED ALPHAFETOPROTEIN LEVEL: ICD-10-CM

## 2025-07-21 DIAGNOSIS — O99.280 THYROID DYSFUNCTION IN PREGNANCY: ICD-10-CM

## 2025-07-21 DIAGNOSIS — Z11.3 SCREENING EXAMINATION FOR STI: ICD-10-CM

## 2025-07-21 DIAGNOSIS — Z3A.13 13 WEEKS GESTATION OF PREGNANCY: ICD-10-CM

## 2025-07-21 DIAGNOSIS — O99.340 DEPRESSION AFFECTING PREGNANCY: ICD-10-CM

## 2025-07-21 PROBLEM — O99.210 OBESITY AFFECTING PREGNANCY: Status: ACTIVE | Noted: 2025-07-21

## 2025-07-21 PROBLEM — O09.819 PREGNANCY CONCEIVED THROUGH IN VITRO FERTILIZATION: Status: ACTIVE | Noted: 2025-07-21

## 2025-07-21 PROBLEM — O09.529 ADVANCED MATERNAL AGE IN MULTIGRAVIDA: Status: ACTIVE | Noted: 2025-07-21

## 2025-07-21 PROCEDURE — PNV: Performed by: STUDENT IN AN ORGANIZED HEALTH CARE EDUCATION/TRAINING PROGRAM

## 2025-07-21 RX ORDER — ASPIRIN 81 MG/1
162 TABLET, CHEWABLE ORAL DAILY
COMMUNITY

## 2025-07-21 NOTE — PROGRESS NOTES
[facility] [place]  [address] [place] [address]  Assessment & Plan  Pregnancy resulting from in vitro fertilization in first trimester  - Plan for fetal echocardiogram, serial growth, and 3rd trimester antepartum fetal surveillance.   - Recommend  mg PO daily beginning now and continuing until 36 weeks gestation for pre-eclampsia risk reduction.        Depression affecting pregnancy  - Mood stable on Celexa       Thyroid dysfunction in pregnancy  - Repeat TSH pending with initial prenatal labs.        Multigravida of advanced maternal age in first trimester         Obesity affecting pregnancy in first trimester, unspecified obesity type         13 weeks gestation of pregnancy  - Continue prenatal vitamin with folic acid.  - Nursing notes from OB intake reviewed.   - Prenatal labs not yet drawn, but patient plans to complete tomorrow. .   - Screening for open neural tube defects reviewed. Order for maternal serum alpha-fetoprotein provided today.  - Level II anatomy ultrasound scheduled with Maternal Fetal Medicine.  - Routine cervical cancer screening: Pap Up to date.  - GC/Chlamydia screening swab collected today.   - Problem list updated, results console reviewed and updated with pertinent prenatal labs.  - PMH, PSH, medications reviewed and updated as needed.  - Return to office in 4 wk(s) for routine prenatal care.          screening for raised alphafetoprotein level    Orders:  •  Alpha fetoprotein, maternal; Future    Screening examination for STI    Orders:  •  Chlamydia/GC AIMEE, Confirmation    Encounter for other specified  screening    Orders:  •  Chlamydia/GC AIMEE, Confirmation    Subjective:   CC:  Initiating prenatal care  Susana Burns is a 34 y.o.  female who presents for initiation of prenatal care.   Pregnancy ROS: No leakage of fluid, pelvic pain, or vaginal bleeding. Mild nausea.        Past Medical History[1]  Past Surgical  "History[1]  Family History[1]  Social History[1]  Outpatient Medications Marked as Taking for the 25 encounter (Initial Prenatal) with Ibrahima Horne MD   Medication   • aspirin 81 mg chewable tablet   • citalopram (CeleXA) 10 mg tablet   • levothyroxine 75 mcg tablet   • Prenat MV-Min w/Fe-Folate-DHA (PRENATAL COMPLETE PO)     Allergies[1]          Objective:     /64 (BP Location: Left arm, Patient Position: Sitting, Cuff Size: Large)   Ht 5' 6\" (1.676 m)   Wt 105 kg (231 lb)   BMI 37.28 kg/m²   Pregravid Weight/BMI: 109 kg (240 lb) (BMI 38.76)  Current Weight: 105 kg (231 lb)   Total Weight Gain: -4.082 kg (-9 lb)   Pre- Vitals    Flowsheet Row Most Recent Value   Prenatal Assessment    Fetal Heart Rate 155   Fundal Height (cm) 13 cm   Prenatal Vitals    Blood Pressure 108/64   Weight - Scale 105 kg (231 lb)   Urine Albumin/Glucose    Dilation/Effacement/Station    Cervical Dilation 0   Cervical Effacement 0   Vaginal Drainage    Draining Fluid No   Edema    LLE Edema None   RLE Edema None   Facial Edema None           Chaperone present? Yes: Nahun Jack MA.    General Appearance: alert and oriented, in no acute distress.   Neck/Thyroid: No thyromegaly, no thyroid nodules.  Respiratory: Unlabored breathing.  Cardiovascular: Regular rate, no peripheral edema.  Abdomen: Soft, non-tender, non-distended, no masses, no rebound or guarding.  Breast Exam: No dimpling, nipple retraction or discharge. No lumps or masses. No axillary or supraclavicular nodes.   Pelvic:       External genitalia: Normal appearance, no abnormal pigmentation, no lesions or masses. Normal Bartholin's and Lyons Switch's.      Urinary system: Urethral meatus normal, bladder non-tender.      Vaginal: normal mucosa without prolapse or lesions. Normal-appearing physiologic discharge.      Cervix: Normal-appearing, well-epithelialized, no gross lesions or masses. No cervical motion tenderness.      Adnexa: No adnexal masses or " tenderness noted.      Uterus: Approximately 13 week-sized, regular contour, midline, mobile, no uterine tenderness.  Extremities: Normal range of motion. Warm, well-perfused, non-tender.   Skin: normal, no rash or abnormalities  Neurologic: alert, oriented x3  Psychiatric: Appropriate affect, mood stable, cooperative with exam.        Ibrahima Horne MD  7/21/2025 3:18 PM         [1]  Past Medical History:  Diagnosis Date   • Anxiety    • Asthma     hx sports induced - inhlaer use   • Depression     on Celexa - had not taken x 2 month (no refill) - restarted in 3/2025   • Female infertility     male factor   • Hypothyroidism     on Levothyroxine - diag [id number] - labs q 4 wks   • PCOS (polycystic ovarian syndrome)     diag 2021   • Varicella     pt had chickenpox in childhood   [1]  No past surgical history on file.[1]  Family History  Problem Relation Name Age of Onset   • Thyroid disease unspecified Mother Mother    • Heart disease Mother Mother    • Stroke Mother Mother    • No Known Problems Father     • Diabetes Brother S         insulin   • Diabetes type II Brother S    • No Known Problems Maternal Grandmother     • No Known Problems Maternal Grandfather     • Asthma Paternal Grandmother     • No Known Problems Paternal Grandfather     • Colon cancer Paternal Cousin Cousin    • Breast cancer Neg Hx     • Ovarian cancer Neg Hx     • Uterine cancer Neg Hx     [1]  Social History  Socioeconomic History   • Marital status: /Civil Union   Occupational History   • Occupation: behavioral therapist   Tobacco Use   • Smoking status: Former     Current packs/day: 0.00     Types: Cigarettes     Quit date: 3/17/2022     Years since quitting: 3.3     Passive exposure: Never   • Smokeless tobacco: Never   • Tobacco comments:     2 cig a day since age 13   Vaping Use   • Vaping status: Never Used   Substance and Sexual Activity   • Alcohol use: Not Currently   • Drug use: Never   • Sexual activity: Yes      Partners: Male     Birth control/protection: None     Social Drivers of Health     Food Insecurity: No Food Insecurity (6/26/2025)    Nursing - Inadequate Food Risk Classification    • Worried About Running Out of Food in the Last Year: Never true    • Ran Out of Food in the Last Year: Never true   Transportation Needs: No Transportation Needs (6/26/2025)    PRAPARE - Transportation    • Lack of Transportation (Medical): No    • Lack of Transportation (Non-Medical): No   Housing Stability: Low Risk  (6/26/2025)    Housing Stability Vital Sign    • Unable to Pay for Housing in the Last Year: No    • Number of Times Moved in the Last Year: 0    • Homeless in the Last Year: No   [1]  No Known Allergies   36.1

## 2025-07-21 NOTE — ASSESSMENT & PLAN NOTE
- Continue prenatal vitamin with folic acid.  - Nursing notes from OB intake reviewed.   - Prenatal labs not yet drawn, but patient plans to complete tomorrow. .   - Screening for open neural tube defects reviewed. Order for maternal serum alpha-fetoprotein provided today.  - Level II anatomy ultrasound scheduled with Maternal Fetal Medicine.  - Routine cervical cancer screening: Pap Up to date.  - GC/Chlamydia screening swab collected today.   - Problem list updated, results console reviewed and updated with pertinent prenatal labs.  - PMH, PSH, medications reviewed and updated as needed.  - Return to office in 4 wk(s) for routine prenatal care.

## 2025-07-21 NOTE — ASSESSMENT & PLAN NOTE
- Plan for fetal echocardiogram, serial growth, and 3rd trimester antepartum fetal surveillance.   - Recommend  mg PO daily beginning now and continuing until 36 weeks gestation for pre-eclampsia risk reduction.

## 2025-07-25 LAB
C TRACH RRNA SPEC QL NAA+PROBE: NEGATIVE
N GONORRHOEA RRNA SPEC QL NAA+PROBE: NEGATIVE

## 2025-07-27 LAB
ABO GROUP BLD: ABNORMAL
APPEARANCE UR: ABNORMAL
BACTERIA UR CULT: ABNORMAL
BACTERIA UR CULT: ABNORMAL
BACTERIA URNS QL MICRO: ABNORMAL
BASOPHILS # BLD AUTO: 0 X10E3/UL (ref 0–0.2)
BASOPHILS NFR BLD AUTO: 0 %
BILIRUB UR QL STRIP: NEGATIVE
BLD GP AB SCN SERPL QL: NEGATIVE
C TRACH RRNA SPEC QL NAA+PROBE: NEGATIVE
CASTS URNS QL MICRO: ABNORMAL /LPF
COLOR UR: YELLOW
EOSINOPHIL # BLD AUTO: 0.2 X10E3/UL (ref 0–0.4)
EOSINOPHIL NFR BLD AUTO: 1 %
EPI CELLS #/AREA URNS HPF: >10 /HPF (ref 0–10)
ERYTHROCYTE [DISTWIDTH] IN BLOOD BY AUTOMATED COUNT: 12.6 % (ref 11.7–15.4)
GLUCOSE 1H P 50 G GLC PO SERPL-MCNC: 101 MG/DL (ref 70–139)
GLUCOSE UR QL: NEGATIVE
HBV SURFACE AG SERPL QL IA: NEGATIVE
HCT VFR BLD AUTO: 38.7 % (ref 34–46.6)
HCV AB S/CO SERPL IA: NON REACTIVE
HGB BLD-MCNC: 12.6 G/DL (ref 11.1–15.9)
HGB UR QL STRIP: NEGATIVE
HIV 1+2 AB+HIV1 P24 AG SERPL QL IA: NON REACTIVE
IMM GRANULOCYTES # BLD: 0 X10E3/UL (ref 0–0.1)
IMM GRANULOCYTES NFR BLD: 0 %
KETONES UR QL STRIP: NEGATIVE
LEUKOCYTE ESTERASE UR QL STRIP: ABNORMAL
LYMPHOCYTES # BLD AUTO: 2.4 X10E3/UL (ref 0.7–3.1)
LYMPHOCYTES NFR BLD AUTO: 23 %
Lab: ABNORMAL
MCH RBC QN AUTO: 29 PG (ref 26.6–33)
MCHC RBC AUTO-ENTMCNC: 32.6 G/DL (ref 31.5–35.7)
MCV RBC AUTO: 89 FL (ref 79–97)
MICRO URNS: ABNORMAL
MONOCYTES # BLD AUTO: 0.5 X10E3/UL (ref 0.1–0.9)
MONOCYTES NFR BLD AUTO: 5 %
N GONORRHOEA RRNA SPEC QL NAA+PROBE: NEGATIVE
NEUTROPHILS # BLD AUTO: 7.3 X10E3/UL (ref 1.4–7)
NEUTROPHILS NFR BLD AUTO: 71 %
NITRITE UR QL STRIP: NEGATIVE
OTHER ANTIBIOTIC SUSC ISLT: ABNORMAL
PH UR STRIP: 6.5 [PH] (ref 5–7.5)
PLATELET # BLD AUTO: 319 X10E3/UL (ref 150–450)
PROT UR QL STRIP: ABNORMAL
RBC # BLD AUTO: 4.35 X10E6/UL (ref 3.77–5.28)
RBC #/AREA URNS HPF: ABNORMAL /HPF (ref 0–2)
RH BLD: POSITIVE
RPR SER QL: NON REACTIVE
RUBV IGG SERPL IA-ACNC: 2.98 INDEX
SL AMB INTERPRETATION: NORMAL
SP GR UR: 1.02 (ref 1–1.03)
UROBILINOGEN UR STRIP-ACNC: 1 MG/DL (ref 0.2–1)
WBC # BLD AUTO: 10.4 X10E3/UL (ref 3.4–10.8)
WBC #/AREA URNS HPF: ABNORMAL /HPF (ref 0–5)

## 2025-08-01 DIAGNOSIS — E03.9 ACQUIRED HYPOTHYROIDISM: ICD-10-CM

## 2025-08-01 RX ORDER — LEVOTHYROXINE SODIUM 75 UG/1
TABLET ORAL
Qty: 102 TABLET | Refills: 1 | Status: SHIPPED | OUTPATIENT
Start: 2025-08-01

## 2025-08-08 ENCOUNTER — TELEPHONE (OUTPATIENT)
Dept: OBGYN CLINIC | Facility: CLINIC | Age: 35
End: 2025-08-08

## 2025-08-23 LAB
2ND TRIMESTER 4 SCREEN SERPL-IMP: NORMAL
AFP ADJ MOM SERPL: 0.47
AFP INTERP AMN-IMP: NORMAL
AFP INTERP SERPL-IMP: NORMAL
AFP INTERP SERPL-IMP: NORMAL
AFP SERPL-MCNC: 26.7 NG/ML
AGE AT DELIVERY: 35.2 YR
GA METHOD: NORMAL
GA: 22 WEEKS
IDDM PATIENT QL: NO
MULTIPLE PREGNANCY: NO
NEURAL TUBE DEFECT RISK FETUS: NORMAL %